# Patient Record
Sex: FEMALE | Race: WHITE | Employment: OTHER | URBAN - METROPOLITAN AREA
[De-identification: names, ages, dates, MRNs, and addresses within clinical notes are randomized per-mention and may not be internally consistent; named-entity substitution may affect disease eponyms.]

---

## 2017-04-14 ENCOUNTER — OFFICE VISIT (OUTPATIENT)
Dept: CARDIOLOGY CLINIC | Age: 27
End: 2017-04-14

## 2017-04-14 VITALS
OXYGEN SATURATION: 98 % | BODY MASS INDEX: 29.16 KG/M2 | RESPIRATION RATE: 19 BRPM | DIASTOLIC BLOOD PRESSURE: 84 MMHG | HEART RATE: 117 BPM | WEIGHT: 175 LBS | HEIGHT: 65 IN | SYSTOLIC BLOOD PRESSURE: 152 MMHG

## 2017-04-14 DIAGNOSIS — M51.37 DDD (DEGENERATIVE DISC DISEASE), LUMBOSACRAL: ICD-10-CM

## 2017-04-14 DIAGNOSIS — R00.0 TACHYCARDIA: Primary | ICD-10-CM

## 2017-04-14 DIAGNOSIS — I10 ESSENTIAL HYPERTENSION: ICD-10-CM

## 2017-04-14 DIAGNOSIS — F90.9 ATTENTION DEFICIT HYPERACTIVITY DISORDER (ADHD), UNSPECIFIED ADHD TYPE: ICD-10-CM

## 2017-04-14 DIAGNOSIS — E71.40 CARNITINE DEFICIENCY (HCC): ICD-10-CM

## 2017-04-14 RX ORDER — METOPROLOL SUCCINATE 50 MG/1
50 TABLET, EXTENDED RELEASE ORAL DAILY
Qty: 30 TAB | Refills: 5 | Status: SHIPPED | OUTPATIENT
Start: 2017-04-14 | End: 2017-05-10 | Stop reason: SDUPTHER

## 2017-04-14 RX ORDER — GABAPENTIN 600 MG/1
600 TABLET ORAL 3 TIMES DAILY
COMMUNITY

## 2017-04-14 NOTE — MR AVS SNAPSHOT
Visit Information Date & Time Provider Department Dept. Phone Encounter #  
 4/14/2017  3:20 PM Brandon Nielsen MD [facility] Elaina Grajeda [phone] [id number] Follow-up Instructions Return in about 4 weeks (around 5/12/2017). Upcoming Health Maintenance Date Due  
 PAP AKA CERVICAL CYTOLOGY 3/7/2011 INFLUENZA AGE 9 TO ADULT 8/1/2016 DTaP/Tdap/Td series (3 - Td) 7/26/2017 Allergies as of 4/14/2017  Review Complete On: 4/14/2017 By: Matt Sotelo Severity Noted Reaction Type Reactions Ceclor Cd  10/28/2010   Side Effect Hives Ceclor [Cefaclor]  07/01/2016    Hives Current Immunizations  Reviewed on 11/4/2016 Name Date HIB Vaccine 6/25/1991, 3/8/1991 Hepatitis B Vaccine 8/8/2001, 5/3/2001, 9/22/2000 IPV 4/7/1994, 10/21/1991, 1990, 1990 Influenza Vaccine 2/1/2016 Influenza Vaccine Whole 12/7/2009 MMR Vaccine 5/5/1995, 6/25/1991 Meningococcal Vaccine 12/7/2006 Pneumococcal Vaccine (Unspecified Type) 12/6/2006 TB Skin Test (PPD) 1/1/2015 TD Vaccine 2/26/2004 TDAP Vaccine 7/26/2007 Varicella Virus Vaccine Live 3/7/1993 Not reviewed this visit You Were Diagnosed With   
  
 Codes Comments Essential hypertension    -  Primary ICD-10-CM: I10 
ICD-9-CM: 401.9 Tachycardia     ICD-10-CM: R00.0 ICD-9-CM: 767. 0 Vitals BP Pulse Resp Height(growth percentile) Weight(growth percentile) LMP  
 152/84 (BP 1 Location: Left arm, BP Patient Position: Sitting) (!) 117 19 5' 5\" (1.651 m) 175 lb (79.4 kg) 06/22/2016 SpO2 BMI OB Status Smoking Status 98% 29.12 kg/m2 Pregnant Never Smoker Vitals History BMI and BSA Data Body Mass Index Body Surface Area  
 29.12 kg/m 2 1.91 m 2 Preferred Pharmacy Pharmacy Name Phone [facility], [address] [facility] [phone] Your Updated Medication List  
  
   
 This list is accurate as of: 4/14/17  4:28 PM.  Always use your most recent med list.  
  
  
  
  
 ALPRAZolam 0.5 mg tablet Commonly known as:  Toy Moment Take 0.5 mg by mouth three (3) times daily as needed for Anxiety. AMPHETAMINE SALT COMBO PO Take 10 mg by mouth every evening. folic acid 1 mg tablet Commonly known as:  Google Take 1 mg by mouth daily. gabapentin 600 mg tablet Commonly known as:  NEURONTIN Take 600 mg by mouth four (4) times daily. levOCARNitine Tartrate 500 mg Cap Commonly known as:  L-CARNITINE Take 10,000 mg by mouth daily. * Lisdexamfetamine 70 mg capsule Commonly known as:  VYVANSE Take 1 Cap by mouth every morning. Fill 1/28/14  
  
 * VYVANSE 70 mg capsule Generic drug:  Lisdexamfetamine Take 1 Cap by mouth every morning. naloxegol 25 mg Tab tablet Commonly known as:  Nile Paulina Take  by mouth Daily (before breakfast). oxyCODONE IR 10 mg Tab immediate release tablet Commonly known as:  Judithe Burn Take 10 mg by mouth two (2) times a day. * Notice: This list has 2 medication(s) that are the same as other medications prescribed for you. Read the directions carefully, and ask your doctor or other care provider to review them with you. We Performed the Following AMB POC EKG ROUTINE W/ 12 LEADS, INTER & REP [97814 CPT(R)] Follow-up Instructions Return in about 4 weeks (around 5/12/2017). Patient Instructions Start Toprol XL 50mg at bedtime. Let us know your BP recordings in 1 week. Introducing [facility]! Dear Mil Mathur: Thank you for requesting a [employer] account. Our records indicate that you have previously registered for a [employer] account but its currently inactive. Please call our [employer] support line at [phone]. Additional Information If you have questions, please visit the Frequently Asked Questions section of the [employer] website at [email or web address] Remember, [employer] is NOT to be used for urgent needs. For medical emergencies, dial 911. Now available from your iPhone and Android! Please provide this summary of care documentation to your next provider. Your primary care clinician is listed as Jing Mon. If you have any questions after today's visit, please call [phone].

## 2017-04-14 NOTE — PROGRESS NOTES
Joanne Art, [facility] [address]  [address]  [place], [address] [facility]    Office [phone]  Fax [phone]  Cell [phone]        Zachary Troncoso is a 32 y.o. female. Last seen 3 years ago. Assessment  Encounter Diagnoses   Name Primary?  Essential hypertension     Tachycardia Yes    Attention deficit hyperactivity disorder (ADHD), unspecified ADHD type     Carnitine deficiency ([address]     DDD (degenerative disc disease), lumbosacral      Recommendations:    Zachary Troncoso has multiple medical problems-carnitine deficiency, ADHD and now DDD with chronic severe pain. She is hyper adrenergic  with sinus tachycardia and diastolic hypertension. I suspect this is driven by pain. In the term, will start Toprol 50mg daily in the evening and have her continue to monitor her BP and heart rate. Will see her back in 1 month to reevaluate. Should surgery be required, she would be at low risk for spine surgery. Follow-up Disposition:  Return in about 4 weeks (around 5/12/2017). Subjective:    She notes elevated resting heart rate and elevated BP for few months now. Her diastolic BPs at home run in the 90s range with resting HR in the 110-125 range. She doesn't report associated dyspnea or cough. Denies any recent life, family or work stressors. Recent blood work with PCP showed normal TSH levels. Patient denies any exertional chest pain, dyspnea, syncope, orthopnea, edema or paroxysmal nocturnal dyspnea. Her activity is limited by chronic low back pain. She has been on oral steroids for 1 month. Notes 30 pound weight gain from this therapy. She was evaluated at [facility], but she is not interested in fusion at this time. She is consistent with isometric PT exercises daily and plans to start aquatic therapy soon. She is a non-smoker. She does drink any caffeine. She takes Zanax as needed.  She's had normal pregnancies in the past.     Cardiac risk factors HTN yes  DM no  Smoking no  Family hx of CAD yes-Mother with [address], Father with AF     Cardiac testing  No specialty comments available. Past Medical History:   Diagnosis Date    ADHD (attention deficit hyperactivity disorder) 2009    dx Dr. Doug Varner in [address]  Carnitine deficiency [facility])     Chronic pain     from myositis, myopathy    FH: rheumatoid arthritis 10/1/2013    Fibromyalgia     HSP (Henoch Schonlein purpura) ([address]     occurs w myopathy flares    HSP (Henoch Schonlein purpura) ([facility] 12/24/2014    Muscle pain     Muscle weakness     Myopathy 2005    Dr. Ira Akbar, Dr. Ryan Maki, genetic/metabolic? mitochondrial disorder,  [facility] muscle biopsy    Tiredness     VSD (ventricular septal defect)     spontaneous closure        Current Outpatient Prescriptions   Medication Sig Dispense Refill    gabapentin (NEURONTIN) 600 mg tablet Take 600 mg by mouth four (4) times daily.  ALPRAZolam (XANAX) 0.5 mg tablet Take 0.5 mg by mouth three (3) times daily as needed for Anxiety.  folic acid (FOLVITE) 1 mg tablet Take 1 mg by mouth daily.  oxyCODONE IR (ROXICODONE) 10 mg tab immediate release tablet Take 10 mg by mouth two (2) times a day.  VYVANSE 70 mg capsule Take 1 Cap by mouth every morning. 30 Cap 0    Lisdexamfetamine (VYVANSE) 70 mg capsule Take 1 Cap by mouth every morning. Fill 1/28/14 30 Cap 0    Levocarnitine Tartrate (L-CARNITINE) 500 mg Cap Take 10,000 mg by mouth daily. 2000 Cap 0    DEXTROAMPHETAMINE/AMPHETAMINE (AMPHETAMINE SALT COMBO PO) Take 10 mg by mouth every evening.  naloxegol (MOVANTIK) 25 mg tab tablet Take  by mouth Daily (before breakfast). Allergies   Allergen Reactions    Ceclor Cd Hives    Ceclor [Cefaclor] Hives          Review of Systems  Constitutional: Negative for fever, chills, malaise/fatigue and diaphoresis. Respiratory: Negative for cough, hemoptysis, sputum production, shortness of breath and wheezing.    Cardiovascular: Negative for chest pain, orthopnea, claudication, leg swelling and PND. Positive for palpitations. Gastrointestinal: Negative for heartburn, nausea, vomiting, blood in stool and melena. Genitourinary: Negative for dysuria and flank pain. Musculoskeletal: Negative for joint pain and back pain. Skin: Negative for rash. Neurological: Negative for focal weakness, seizures, loss of consciousness, weakness and headaches. Endo/Heme/Allergies: Does not bruise/bleed easily. Psychiatric/Behavioral: Negative for memory loss. The patient does not have insomnia. Physical Exam    Visit Vitals    /84 (BP 1 Location: Left arm, BP Patient Position: Sitting)    Pulse (!) 117    Resp 19    Ht 5' 5\" (1.651 m)    Wt 175 lb (79.4 kg)    LMP 06/22/2016    SpO2 98%    BMI 29.12 kg/m2     Wt Readings from Last 3 Encounters:   04/14/17 175 lb (79.4 kg)   11/04/16 175 lb (79.4 kg)   07/01/16 169 lb 9.6 oz (76.9 kg)      General - well developed well nourished  Neck - JVP normal, thyroid nl  Cardiac - normal S1, S2, no murmurs, rubs or gallops. No clicks  Vascular - carotids without bruits, radials, femorals and pedal pulses equal bilateral  Lungs - clear to auscultation bilaterals, no rales, wheezing or rhonchi  Abd - soft nontender, no HSM, no abd bruits  Extremities - no edema  Skin - no rash  Neuro - nonfocal  Psych - normal mood and affect      Cardiographics  ECG 12/24/14 - RSR', probably normal variant, normal axis, normal voltage. Echo 04/14/17- LVEF 65%, normal study.    EKG 04/14/17- Sinus tachycardia 109, RSR' V1, similar to previous EKG     Written by Nellie Pallas, as dictated by Xochitl Eng MD.   Xochitl Egn MD

## 2017-04-14 NOTE — PROGRESS NOTES
Visit Vitals    /84 (BP 1 Location: Left arm, BP Patient Position: Sitting)    Pulse (!) 117    Resp 19    Ht 5' 5\" (1.651 m)    Wt 175 lb (79.4 kg)    SpO2 98%    BMI 29.12 kg/m2

## 2017-04-24 ENCOUNTER — TELEPHONE (OUTPATIENT)
Dept: CARDIOLOGY CLINIC | Age: 27
End: 2017-04-24

## 2017-04-24 NOTE — TELEPHONE ENCOUNTER
JUAN Rose LPN        Caller: Unspecified (Today, 10:54 AM)                     BP and HR trend have both improved on Toprol 50.  Continue current medication regimen and follow up with Dr. Bethany Langley next month as previously scheduled. Patient notified. She voices understanding. She states she has met her deductible for the year until June if any additional testing is needed.

## 2017-04-24 NOTE — TELEPHONE ENCOUNTER
Patient called regarding her BP readings. 4/18 158/102            4/19 148/96       112  4/20 156/95       108   4/21 136/88       106  4/22 134/93         97  4/23 129/84        110  4/24 120/76          96     She can be reached at [phone].  Thanks

## 2017-05-03 ENCOUNTER — TELEPHONE (OUTPATIENT)
Dept: CARDIOLOGY CLINIC | Age: 27
End: 2017-05-03

## 2017-05-03 NOTE — TELEPHONE ENCOUNTER
Please call Mrs. Loera at [phone]. Her ortho dr cancelled her spinal injection for tomorrow, 5/4/17 due to elevated heart rate that would not come down, 115/130.   Please call her to advise, she's in a lot of pain and would really like to be able to get back on the schedule for tomorrow with the ortho dr. Familia Mims you, Radha Butler

## 2017-05-03 NOTE — TELEPHONE ENCOUNTER
Patient states her spinal injection was rescheduled to tomorrow morning due to elevated heart rate. 5/3 157/98 P122  5/2 146/99 P132  5/1 140/98 P113    Discussed relaxation techniques. She states she has cut down a lot on caffeine intake. Will follow-up on medication regimen in the morning.

## 2017-05-04 NOTE — TELEPHONE ENCOUNTER
JUAN Zambrano LPN        Caller: Unspecified (Yesterday,  1:41 PM)                     Did she start the Toprol XL 50 mg, after she saw Dr. Jasmin Chung 3 weeks ago. She's supposed to come back 1 month from that appt. Perhaps she should postpone the injection until she she's Dr. Jasmin Chung again. Confirmed that patient has increased Toprol XL to 50mg daily. She did not get her injection today. HR this morning is 115. Will move patient's follow-up appointment to 5/10/17.

## 2017-05-10 ENCOUNTER — OFFICE VISIT (OUTPATIENT)
Dept: CARDIOLOGY CLINIC | Age: 27
End: 2017-05-10

## 2017-05-10 VITALS
BODY MASS INDEX: 33.76 KG/M2 | HEIGHT: 65 IN | HEART RATE: 120 BPM | WEIGHT: 202.6 LBS | OXYGEN SATURATION: 100 % | DIASTOLIC BLOOD PRESSURE: 90 MMHG | SYSTOLIC BLOOD PRESSURE: 120 MMHG

## 2017-05-10 DIAGNOSIS — M51.37 DDD (DEGENERATIVE DISC DISEASE), LUMBOSACRAL: ICD-10-CM

## 2017-05-10 DIAGNOSIS — F41.9 ANXIETY: ICD-10-CM

## 2017-05-10 DIAGNOSIS — G89.4 CHRONIC PAIN SYNDROME: ICD-10-CM

## 2017-05-10 DIAGNOSIS — F90.9 ATTENTION DEFICIT HYPERACTIVITY DISORDER (ADHD), UNSPECIFIED ADHD TYPE: ICD-10-CM

## 2017-05-10 DIAGNOSIS — R00.0 SINUS TACHYCARDIA: Primary | ICD-10-CM

## 2017-05-10 DIAGNOSIS — E71.40 CARNITINE DEFICIENCY (HCC): ICD-10-CM

## 2017-05-10 DIAGNOSIS — G89.29 CHRONIC BACK PAIN GREATER THAN 3 MONTHS DURATION: ICD-10-CM

## 2017-05-10 DIAGNOSIS — M54.9 CHRONIC BACK PAIN GREATER THAN 3 MONTHS DURATION: ICD-10-CM

## 2017-05-10 DIAGNOSIS — I10 ESSENTIAL HYPERTENSION: ICD-10-CM

## 2017-05-10 RX ORDER — METOPROLOL SUCCINATE 100 MG/1
100 TABLET, EXTENDED RELEASE ORAL DAILY
Qty: 30 TAB | Refills: 5 | Status: SHIPPED | OUTPATIENT
Start: 2017-05-10 | End: 2018-01-31 | Stop reason: SDUPTHER

## 2017-05-10 NOTE — MR AVS SNAPSHOT
Visit Information Date & Time Provider Department Dept. Phone Encounter #  
 5/10/2017  8:40 AM Himanshu Díaz MD [facility] Laurel Gordon [phone] [id number] Follow-up Instructions Return in about 6 months (around 11/10/2017). Upcoming Health Maintenance Date Due  
 PAP AKA CERVICAL CYTOLOGY 3/7/2011 DTaP/Tdap/Td series (3 - Td) 7/26/2017 INFLUENZA AGE 9 TO ADULT 8/1/2017 Allergies as of 5/10/2017  Review Complete On: 5/10/2017 By: Ora Mckinnon  
  
 Severity Noted Reaction Type Reactions Ceclor Cd  10/28/2010   Side Effect Hives Ceclor [Cefaclor]  07/01/2016    Hives Current Immunizations  Reviewed on 11/4/2016 Name Date HIB Vaccine 6/25/1991, 3/8/1991 Hepatitis B Vaccine 8/8/2001, 5/3/2001, 9/22/2000 IPV 4/7/1994, 10/21/1991, 1990, 1990 Influenza Vaccine 2/1/2016 Influenza Vaccine Whole 12/7/2009 MMR Vaccine 5/5/1995, 6/25/1991 Meningococcal Vaccine 12/7/2006 Pneumococcal Vaccine (Unspecified Type) 12/6/2006 TB Skin Test (PPD) 1/1/2015 TD Vaccine 2/26/2004 TDAP Vaccine 7/26/2007 Varicella Virus Vaccine Live 3/7/1993 Not reviewed this visit You Were Diagnosed With   
  
 Codes Comments Essential hypertension    -  Primary ICD-10-CM: I10 
ICD-9-CM: 401.9 Carnitine deficiency ([facility]     ICD-10-CM: E71.40 ICD-9-CM: 277.81 Attention deficit hyperactivity disorder (ADHD), unspecified ADHD type     ICD-10-CM: F90.9 ICD-9-CM: 314.01   
 DDD (degenerative disc disease), lumbosacral     ICD-10-CM: M51.37 
ICD-9-CM: 722.52 Chronic back pain greater than 3 months duration     ICD-10-CM: M54.9, G89.29 ICD-9-CM: 724.5, 338.29 Vitals BP Pulse Height(growth percentile) Weight(growth percentile) LMP SpO2  
 120/90 (BP 1 Location: Left arm, BP Patient Position: Sitting) (!) 120 5' 5\" (1.651 m) 202 lb 9.6 oz (91.9 kg) 06/22/2016 100% BMI OB Status Smoking Status 33.71 kg/m2 Pregnant Never Smoker Vitals History BMI and BSA Data Body Mass Index Body Surface Area 33.71 kg/m 2 2.05 m 2 Preferred Pharmacy Pharmacy Name Phone [address], [place] [phone] Your Updated Medication List  
  
   
This list is accurate as of: 5/10/17  9:42 AM.  Always use your most recent med list.  
  
  
  
  
 ALPRAZolam 0.5 mg tablet Commonly known as:  Lorelee Fitzwilliam Take 0.5 mg by mouth three (3) times daily as needed for Anxiety. AMPHETAMINE SALT COMBO PO Take 10 mg by mouth every evening. folic acid 1 mg tablet Commonly known as:  Google Take 1 mg by mouth daily. gabapentin 600 mg tablet Commonly known as:  NEURONTIN Take 600 mg by mouth four (4) times daily. levOCARNitine Tartrate 500 mg Cap Commonly known as:  L-CARNITINE Take 10,000 mg by mouth daily. Lisdexamfetamine 70 mg capsule Commonly known as:  VYVANSE Take 1 Cap by mouth every morning. Fill 1/28/14  
  
 metoprolol succinate 50 mg XL tablet Commonly known as:  TOPROL-XL Take 1 Tab by mouth daily. oxyCODONE IR 10 mg Tab immediate release tablet Commonly known as:  Michaell Lunch Take 10 mg by mouth as needed. Follow-up Instructions Return in about 6 months (around 11/10/2017). Patient Instructions Increase Toprol XL 50mg to twice a day. Introducing [facility]! [employer] introduces [employer] patient portal. Now you can access parts of your medical record, email your doctor's office, and request medication refills online. 1. In your internet browser, go to [email or web address] 2. Click on the First Time User? Click Here link in the Sign In box. You will see the New Member Sign Up page. 3. Enter your [employer] Access Code exactly as it appears below. You will not need to use this code after youve completed the sign-up process.  If you do not sign up before the expiration date, you must request a new code. · [employer] Access Code: [email or web address] Expires: 7/13/2017  4:31 PM 
 
4. Enter the last four digits of your Social Security Number (xxxx) and Date of Birth (mm/dd/yyyy) as indicated and click Submit. You will be taken to the next sign-up page. 5. Create a [employer] ID. This will be your [employer] login ID and cannot be changed, so think of one that is secure and easy to remember. 6. Create a [employer] password. You can change your password at any time. 7. Enter your Password Reset Question and Answer. This can be used at a later time if you forget your password. 8. Enter your e-mail address. You will receive e-mail notification when new information is available in [address] 9. Click Sign Up. You can now view and download portions of your medical record. 10. Click the Download Summary menu link to download a portable copy of your medical information. If you have questions, please visit the Frequently Asked Questions section of the [employer] website. Remember, [employer] is NOT to be used for urgent needs. For medical emergencies, dial 911. Now available from your iPhone and Android! Please provide this summary of care documentation to your next provider. Your primary care clinician is listed as Genevieve Damian. If you have any questions after today's visit, please call [phone].

## 2017-05-10 NOTE — PROGRESS NOTES
Visit Vitals    /90 (BP 1 Location: Left arm, BP Patient Position: Sitting)    Pulse (!) 120    Ht 5' 5\" (1.651 m)    Wt 202 lb 9.6 oz (91.9 kg)    SpO2 100%    BMI 33.71 kg/m2

## 2017-05-10 NOTE — PROGRESS NOTES
Joanne Palma, [address]  [address]  [place], [address] [facility]    Office [phone]  Fax [phone]  Cell [phone]        Bassam Gordon is a 32 y.o. female. Last seen 3 years ago. Assessment  Encounter Diagnoses   Name Primary?  Essential hypertension     Carnitine deficiency (HCC)     Attention deficit hyperactivity disorder (ADHD), unspecified ADHD type     DDD (degenerative disc disease), lumbosacral     Chronic back pain greater than 3 months duration     Chronic pain syndrome     Anxiety     Sinus tachycardia Yes     Recommendations:    Bassam Gordon continues to have severe low back pain due to advanced DDD. She continues to be hyperadrenergic with ST and diastolic HTN despite Toprol XL 50mg daily. Short term, will increase Toprol XL 50mg BID. She is trying to follow stress management techniques. I encouraged her to take Xanax more regularly to manage her anxiety. She will discuss more pain management techniques with her spine specialist.    She is at low cardiac risk for any spinal procedure. Follow-up Disposition:  Return in about 6 months (around 11/10/2017). Subjective:    She continues to have elevated HR at rest and with activity. She pursues deep breathing and aqua therapy to help lower her heart rate, which has been successful intermittently. I reviewed recent BPs and HR - variable but have seen normal BPs and HR < 100 when her pain is less. She takes Oxycodone as needed for pain. She was also placed on Gabapentin, but is afraid of taking multiple pain/nerve relief medications all at the same. She is not taking any NSAIDS as she plans to have a lumbar injection at L4-L5. She takes Xanax as needed every other day with improvement in her anxiety and palpitations. Patient denies any exertional chest pain, dyspnea, syncope, orthopnea, edema or paroxysmal nocturnal dyspnea.     Cardiac risk factors   HTN yes  DM no  Smoking no  Family hx of CAD yes-Mother with [address] [place], Father with AF     Cardiac testing  No specialty comments available. Past Medical History:   Diagnosis Date    ADHD (attention deficit hyperactivity disorder) 2009    dx Dr. Cooper Callaway in [address] [place]  Carnitine deficiency [facility])     Chronic pain     from myositis, myopathy    FH: rheumatoid arthritis 10/1/2013    Fibromyalgia     HSP (Henoch Schonlein purpura) ([facility]     occurs w myopathy flares    HSP (Henoch Schonlein purpura) ([facility] 12/24/2014    Muscle pain     Muscle weakness     Myopathy 2005    Dr. Divine Wan, Dr. Preston Singer, genetic/metabolic? mitochondrial disorder,  [facility] muscle biopsy    Tiredness     VSD (ventricular septal defect)     spontaneous closure        Current Outpatient Prescriptions   Medication Sig Dispense Refill    gabapentin (NEURONTIN) 600 mg tablet Take 600 mg by mouth four (4) times daily.  metoprolol succinate (TOPROL-XL) 50 mg XL tablet Take 1 Tab by mouth daily. 30 Tab 5    ALPRAZolam (XANAX) 0.5 mg tablet Take 0.5 mg by mouth three (3) times daily as needed for Anxiety.  DEXTROAMPHETAMINE/AMPHETAMINE (AMPHETAMINE SALT COMBO PO) Take 10 mg by mouth every evening.  folic acid (FOLVITE) 1 mg tablet Take 1 mg by mouth daily.  oxyCODONE IR (ROXICODONE) 10 mg tab immediate release tablet Take 10 mg by mouth as needed.  Lisdexamfetamine (VYVANSE) 70 mg capsule Take 1 Cap by mouth every morning. Fill 1/28/14 30 Cap 0    Levocarnitine Tartrate (L-CARNITINE) 500 mg Cap Take 10,000 mg by mouth daily. 2000 Cap 0       Allergies   Allergen Reactions    Ceclor Cd Hives    Ceclor [Cefaclor] Hives          Review of Systems  Constitutional: Negative for fever, chills, malaise/fatigue and diaphoresis. Respiratory: Negative for cough, hemoptysis, sputum production, shortness of breath and wheezing. Cardiovascular: Negative for chest pain, orthopnea, claudication, leg swelling and PND.  Positive for palpitations. Gastrointestinal: Negative for heartburn, nausea, vomiting, blood in stool and melena. Genitourinary: Negative for dysuria and flank pain. Musculoskeletal: Negative for joint pain and back pain. Skin: Negative for rash. Neurological: Negative for focal weakness, seizures, loss of consciousness, weakness and headaches. Endo/Heme/Allergies: Does not bruise/bleed easily. Psychiatric/Behavioral: Negative for memory loss. The patient does not have insomnia. Physical Exam    Visit Vitals    /90 (BP 1 Location: Left arm, BP Patient Position: Sitting)    Pulse (!) 120    Ht 5' 5\" (1.651 m)    Wt 202 lb 9.6 oz (91.9 kg)    LMP 06/22/2016    SpO2 100%    BMI 33.71 kg/m2     Wt Readings from Last 3 Encounters:   05/10/17 202 lb 9.6 oz (91.9 kg)   04/14/17 175 lb (79.4 kg)   11/04/16 175 lb (79.4 kg)      General - well developed well nourished  Neck - JVP normal, thyroid nl  Cardiac - normal S1, S2, no murmurs, rubs or gallops. No clicks  Vascular - carotids without bruits, radials, femorals and pedal pulses equal bilateral  Lungs - clear to auscultation bilaterals, no rales, wheezing or rhonchi  Abd - soft nontender, no HSM, no abd bruits  Extremities - no edema  Skin - no rash  Neuro - nonfocal  Psych - normal mood and affect      Cardiographics  ECG 12/24/14 - RSR', probably normal variant, normal axis, normal voltage. Echo 04/14/17- LVEF 65%, normal study.    EKG 04/14/17- Sinus tachycardia 109, RSR' V1, similar to previous EKG     Written by Mai Garay, as dictated by Elmer Myles MD.   Elmer Myles MD

## 2017-06-01 ENCOUNTER — HOSPITAL ENCOUNTER (OUTPATIENT)
Dept: MRI IMAGING | Age: 27
Discharge: HOME | End: 2017-06-01
Attending: INTERNAL MEDICINE
Payer: COMMERCIAL

## 2017-06-01 DIAGNOSIS — M45.0 ANKYLOSING SPONDYLITIS OF MULTIPLE SITES IN SPINE (HCC): ICD-10-CM

## 2017-06-01 DIAGNOSIS — M54.9 BACK PAIN: ICD-10-CM

## 2017-06-01 PROCEDURE — 72195 MRI PELVIS W/O DYE: CPT

## 2017-06-07 ENCOUNTER — HOSPITAL ENCOUNTER (OUTPATIENT)
Dept: MRI IMAGING | Age: 27
Discharge: HOME | End: 2017-06-07
Attending: INTERNAL MEDICINE
Payer: COMMERCIAL

## 2017-06-07 DIAGNOSIS — M54.9 BACK PAIN: ICD-10-CM

## 2017-06-07 DIAGNOSIS — M45.0 ANKYLOSING SPONDYLITIS OF MULTIPLE SITES IN SPINE (HCC): ICD-10-CM

## 2017-06-07 PROCEDURE — 74011250636 HC RX REV CODE- 250/636: Performed by: INTERNAL MEDICINE

## 2017-06-07 PROCEDURE — 72197 MRI PELVIS W/O & W/DYE: CPT

## 2017-06-07 PROCEDURE — A9585 GADOBUTROL INJECTION: HCPCS | Performed by: INTERNAL MEDICINE

## 2017-06-07 RX ADMIN — GADOBUTROL 8 ML: 604.72 INJECTION INTRAVENOUS at 14:25

## 2018-01-15 ENCOUNTER — OFFICE VISIT (OUTPATIENT)
Dept: CARDIOLOGY CLINIC | Age: 28
End: 2018-01-15

## 2018-01-15 VITALS
SYSTOLIC BLOOD PRESSURE: 100 MMHG | WEIGHT: 198.6 LBS | HEIGHT: 65 IN | DIASTOLIC BLOOD PRESSURE: 88 MMHG | BODY MASS INDEX: 33.09 KG/M2 | OXYGEN SATURATION: 99 % | HEART RATE: 120 BPM

## 2018-01-15 DIAGNOSIS — I10 ESSENTIAL HYPERTENSION: Primary | ICD-10-CM

## 2018-01-15 DIAGNOSIS — R00.0 SINUS TACHYCARDIA: ICD-10-CM

## 2018-01-15 DIAGNOSIS — M54.9 CHRONIC BACK PAIN GREATER THAN 3 MONTHS DURATION: ICD-10-CM

## 2018-01-15 DIAGNOSIS — G89.4 CHRONIC PAIN SYNDROME: ICD-10-CM

## 2018-01-15 DIAGNOSIS — F90.9 ATTENTION DEFICIT HYPERACTIVITY DISORDER (ADHD), UNSPECIFIED ADHD TYPE: ICD-10-CM

## 2018-01-15 DIAGNOSIS — M51.37 DDD (DEGENERATIVE DISC DISEASE), LUMBOSACRAL: ICD-10-CM

## 2018-01-15 DIAGNOSIS — G89.29 CHRONIC BACK PAIN GREATER THAN 3 MONTHS DURATION: ICD-10-CM

## 2018-01-15 NOTE — PROGRESS NOTES
Joanne Burnett [address]  [address], [address]  [place], [address] [facility]    Office [phone]  Fax [phone]  Cell [phone]        Faviola Tatum is a 32 y.o. female. Last seen 8 months ago. Assessment  Encounter Diagnoses   Name Primary?  Essential hypertension Yes    Attention deficit hyperactivity disorder (ADHD), unspecified ADHD type     Chronic pain syndrome     Chronic back pain greater than 3 months duration     DDD (degenerative disc disease), lumbosacral     Sinus tachycardia      Recommendations:    Faviola Tatum has chronic ST and diastolic HTN due to hyperadernegic state as a result of chronic pain. Despite back surgery 6 months ago, she continues to have chronic, severe back pain. She is under significant financial pressures as well and is  from her . I do believe she would benefit from an integrative medicine approach focusing on alternative forms of pain and stress management. Continue Toprol for now. To simplify her care, I will see her on a PRN basis and have Dr. Munir White manage her Toprol prescriptions. Subjective:    Ms. Lily Giraldo underwent lumbar surgery at [facility] last June. She continues to have back pain and hopes to restart PT in the near future. She does PRN pain medication and yoga for pain. Back pain limits activity, but patient tries to stay active with short increments of walking and reclining bike. She denies any exertional sxs. She continues to notice racing heart rates about once a month and has seen HRs of 140 at rest. She states HR has been running high for the past 2 years with her back pain. She did not have any issues with tachycardia prior to that. Patient denies any exertional chest pain, dyspnea, syncope, orthopnea, edema or paroxysmal nocturnal dyspnea. Of note, she recently filed for disability and is  from her .     Cardiac risk factors   HTN yes  DM no  Smoking no  Family hx of CAD yes- Mother with [address] [place], Father with AF     Cardiac testing  No specialty comments available. Past Medical History:   Diagnosis Date    ADHD (attention deficit hyperactivity disorder) 2009    dx Dr. Yan Paris in [address]  Carnitine deficiency [facility])     Chronic pain     from myositis, myopathy    FH: rheumatoid arthritis 10/1/2013    Fibromyalgia     HSP (Henoch Schonlein purpura) ([facility] [address]     occurs w myopathy flares    HSP (Henoch Schonlein purpura) ([facility] [address] 12/24/2014    Muscle pain     Muscle weakness     Myopathy 2005    Dr. Susan Davis, Dr. Rob Duron, genetic/metabolic? mitochondrial disorder,  [facility] muscle biopsy    Tiredness     VSD (ventricular septal defect)     spontaneous closure        Current Outpatient Prescriptions   Medication Sig Dispense Refill    metoprolol succinate (TOPROL-XL) 100 mg tablet Take 1 Tab by mouth daily. 30 Tab 5    gabapentin (NEURONTIN) 600 mg tablet Take 300 mg by mouth daily.  ALPRAZolam (XANAX) 0.5 mg tablet Take 0.5 mg by mouth three (3) times daily as needed for Anxiety.  folic acid (FOLVITE) 1 mg tablet Take 1 mg by mouth daily.  oxyCODONE IR (ROXICODONE) 10 mg tab immediate release tablet Take 10 mg by mouth as needed.  Lisdexamfetamine (VYVANSE) 70 mg capsule Take 1 Cap by mouth every morning. Fill 1/28/14 30 Cap 0    Levocarnitine Tartrate (L-CARNITINE) 500 mg Cap Take 10,000 mg by mouth daily. 2000 Cap 0    DEXTROAMPHETAMINE/AMPHETAMINE (AMPHETAMINE SALT COMBO PO) Take 10 mg by mouth every evening. Allergies   Allergen Reactions    Ceclor Cd Hives    Ceclor [Cefaclor] Hives          Review of Systems  Constitutional: Negative for fever, chills, malaise/fatigue and diaphoresis. Respiratory: Negative for cough, hemoptysis, sputum production, shortness of breath and wheezing. Cardiovascular: Negative for chest pain, orthopnea, claudication, leg swelling and PND.   Gastrointestinal: Negative for heartburn, nausea, vomiting, blood in stool and melena. Genitourinary: Negative for dysuria and flank pain. Musculoskeletal: +chronic back pain. Skin: Negative for rash. Neurological: Negative for focal weakness, seizures, loss of consciousness, weakness and headaches. Endo/Heme/Allergies: Does not bruise/bleed easily. Psychiatric/Behavioral: Negative for memory loss. The patient does not have insomnia. Physical Exam    Visit Vitals    /88 (BP 1 Location: Left arm, BP Patient Position: Sitting)    Pulse (!) 120    Ht 5' 5\" (1.651 m)    Wt 198 lb 9.6 oz (90.1 kg)    SpO2 99%    BMI 33.05 kg/m2     Wt Readings from Last 3 Encounters:   01/15/18 198 lb 9.6 oz (90.1 kg)   05/10/17 202 lb 9.6 oz (91.9 kg)   04/14/17 175 lb (79.4 kg)      General - well developed well nourished  Neck - JVP normal, thyroid nl  Cardiac - normal S1, S2, no murmurs, rubs or gallops. No clicks  Vascular - carotids without bruits, radials, femorals and pedal pulses equal bilateral  Lungs - clear to auscultation bilaterals, no rales, wheezing or rhonchi  Abd - soft nontender, no HSM, no abd bruits  Extremities - no edema  Skin - no rash  Neuro - nonfocal  Psych - normal mood and affect      Cardiographics  ECG 12/24/14 - RSR', probably normal variant, normal axis, normal voltage. Echo 04/14/17- LVEF 65%, normal study.    EKG 04/14/17- Sinus tachycardia 109, RSR' V1, similar to previous EKG   EKG 1/15/18-     Written by Izabella Velarde, as dictated by Jakob Chowdhury MD.   Jakob Chowdhury MD

## 2018-01-15 NOTE — PROGRESS NOTES
Visit Vitals    /88 (BP 1 Location: Left arm, BP Patient Position: Sitting)    Pulse (!) 120    Ht 5' 5\" (1.651 m)    Wt 198 lb 9.6 oz (90.1 kg)    SpO2 99%    BMI 33.05 kg/m2

## 2018-01-15 NOTE — MR AVS SNAPSHOT
[address] [address] [address] 
[phone] Patient: Faviola Tatum MRN: [id number] SNY:[id number][birth year] Visit Information Date & Time Provider Department Dept. Phone Encounter #  
 1/15/2018  3:20 PM Sherren Cruise, MD [facility] [address] [phone] [id number] Upcoming Health Maintenance Date Due  
 PAP AKA CERVICAL CYTOLOGY 3/7/2011 OB 3RD TRIMESTER TDAP 3/12/2015 DTaP/Tdap/Td series (3 - Td) 7/26/2017 Influenza Age 5 to Adult 8/1/2017 Allergies as of 1/15/2018  Review Complete On: 1/15/2018 By: Faviola Platais  
  
 Severity Noted Reaction Type Reactions Ceclor Cd  10/28/2010   Side Effect Hives Ceclor [Cefaclor]  07/01/2016    Hives Current Immunizations  Reviewed on 11/4/2016 Name Date HIB Vaccine 6/25/1991, 3/8/1991 Hepatitis B Vaccine 8/8/2001, 5/3/2001, 9/22/2000 IPV 4/7/1994, 10/21/1991, 1990, 1990 Influenza Vaccine 2/1/2016 Influenza Vaccine Whole 12/7/2009 MMR Vaccine 5/5/1995, 6/25/1991 Meningococcal Vaccine 12/7/2006 TB Skin Test (PPD) 1/1/2015 TD Vaccine 2/26/2004 TDAP Vaccine 7/26/2007 Varicella Virus Vaccine Live 3/7/1993 ZZZ-RETIRED (DO NOT USE) Pneumococcal Vaccine (Unspecified Type) 12/6/2006 Not reviewed this visit You Were Diagnosed With   
  
 Codes Comments Essential hypertension    -  Primary ICD-10-CM: I10 
ICD-9-CM: 401.9 Vitals BP Pulse Height(growth percentile) Weight(growth percentile) SpO2 BMI  
 100/88 (BP 1 Location: Left arm, BP Patient Position: Sitting) (!) 120 5' 5\" (1.651 m) 198 lb 9.6 oz (90.1 kg) 99% 33.05 kg/m2 OB Status Smoking Status Pregnant Never Smoker Vitals History BMI and BSA Data Body Mass Index Body Surface Area 33.05 kg/m 2 2.03 m 2 Preferred Pharmacy Pharmacy Name Phone [address], [address] [phone] Your Updated Medication List  
  
   
This list is accurate as of: 1/15/18  3:53 PM.  Always use your most recent med list.  
  
  
  
  
 ALPRAZolam 0.5 mg tablet Commonly known as:  [place]a [place] Take 0.5 mg by mouth three (3) times daily as needed for Anxiety. AMPHETAMINE SALT COMBO PO Take 10 mg by mouth every evening. folic acid 1 mg tablet Commonly known as:  [employer] Take 1 mg by mouth daily. gabapentin 600 mg tablet Commonly known as:  NEURONTIN Take 300 mg by mouth daily. levOCARNitine Tartrate 500 mg Cap Commonly known as:  L-CARNITINE Take 10,000 mg by mouth daily. Lisdexamfetamine 70 mg capsule Commonly known as:  VYVANSE Take 1 Cap by mouth every morning. Fill 1/28/14  
  
 metoprolol succinate 100 mg tablet Commonly known as:  TOPROL-XL Take 1 Tab by mouth daily. oxyCODONE IR 10 mg Tab immediate release tablet Commonly known as:  Evita Dennis Take 10 mg by mouth as needed. Introducing [facility]! [facility] introduces [employer] patient portal. Now you can access parts of your medical record, email your doctor's office, and request medication refills online. 1. In your internet browser, go to [email or web address] 2. Click on the First Time User? Click Here link in the Sign In box. You will see the New Member Sign Up page. 3. Enter your [employer] Access Code exactly as it appears below. You will not need to use this code after youve completed the sign-up process. If you do not sign up before the expiration date, you must request a new code. · [employer] Access Code: [email or web address] Expires: 4/15/2018  3:53 PM 
 
4. Enter the last four digits of your Social Security Number (xxxx) and Date of Birth (mm/dd/yyyy) as indicated and click Submit. You will be taken to the next sign-up page. 5. Create a Cl[employer]ia ID. This will be your [employer] login ID and cannot be changed, so think of one that is secure and easy to remember. 6. Create a [employer]ia password. You can change your password at any time. 7. Enter your Password Reset Question and Answer. This can be used at a later time if you forget your password. 8. Enter your e-mail address. You will receive e-mail notification when new information is available in [address] 9. Click Sign Up. You can now view and download portions of your medical record. 10. Click the Download Summary menu link to download a portable copy of your medical information. If you have questions, please visit the Frequently Asked Questions section of the [employer] website. Remember, [employer] is NOT to be used for urgent needs. For medical emergencies, dial 911. Now available from your iPhone and Android! Please provide this summary of care documentation to your next provider. Your primary care clinician is listed as Rebecca Martínez. If you have any questions after today's visit, please call [phone].

## 2018-01-17 PROBLEM — R00.0 SINUS TACHYCARDIA: Status: ACTIVE | Noted: 2018-01-17

## 2018-01-31 RX ORDER — METOPROLOL SUCCINATE 100 MG/1
100 TABLET, EXTENDED RELEASE ORAL DAILY
Qty: 90 TAB | Refills: 3 | Status: SHIPPED | OUTPATIENT
Start: 2018-01-31 | End: 2018-02-28 | Stop reason: SDUPTHER

## 2018-12-06 ENCOUNTER — OFFICE VISIT (OUTPATIENT)
Dept: CARDIOLOGY CLINIC | Age: 28
End: 2018-12-06

## 2018-12-06 VITALS
HEIGHT: 65 IN | WEIGHT: 173 LBS | HEART RATE: 126 BPM | BODY MASS INDEX: 28.82 KG/M2 | DIASTOLIC BLOOD PRESSURE: 86 MMHG | SYSTOLIC BLOOD PRESSURE: 140 MMHG | RESPIRATION RATE: 16 BRPM

## 2018-12-06 DIAGNOSIS — I10 ESSENTIAL HYPERTENSION: Primary | ICD-10-CM

## 2018-12-06 RX ORDER — CLONAZEPAM 1 MG/1
TABLET ORAL
COMMUNITY
Start: 2018-11-30 | End: 2021-12-10

## 2018-12-06 NOTE — PROGRESS NOTES
Chief Complaint   Patient presents with    Irregular Heart Beat    Fatigue     Visit Vitals  /86 (BP 1 Location: Left arm, BP Patient Position: Sitting)   Pulse (!) 126   Resp 16   Ht 5' 5\" (1.651 m)   Wt 173 lb (78.5 kg)   LMP 11/27/2018 (Exact Date)   Breastfeeding?  No   BMI 28.79 kg/m²

## 2018-12-08 NOTE — PROGRESS NOTES
[facility] [address], [address] [facility], [address] [place] [address]    Office [phone],N [phone]           Lionel Santana is a 29 y.o. female seen in urgent visit for ongoing palpitations    Assessment/Recommendations:    Palpitations- EKG with evidence of PACs that appear to be very symptomatic with acute worsening over the last several weeks    Inappropriate sinus tachycardia-has been present for several years    ADHD  Chronic pain  Anxiety disorder  Myopathy disorder    -Primary care physician recently increased metoprolol  succinate 200 mg daily, recommend to continue at current dose, which will hopefully reduce her PACs  -We will start a trial of ivabradine 5 mg twice daily for inappropriate sinus tachycardia to see if it relieves some of her symptoms    Primary Care Physician- Sav Jennings MD    Follow-up 1 month with Dr. Rock Hoffman:  19-year-old female  who is followed by Dr. Jennifer Mohan for inappropriate sinus tachycardia in the setting of ADHD and chronic pain syndrome. She has been on metoprolol for several years. It appears over the last several weeks she has had increased palpitations symptoms. She states she has decreased her ADHD medication without significant improvement. She often will feel the skipping unusual feeling within her throat. Her EKG in the office today shows sinus tachycardia with PACs. She is very anxious in the office today and is concerned something major is wrong with her heart.       Past Medical History:   Diagnosis Date    ADHD (attention deficit hyperactivity disorder) 2009    dx Dr. Dena Lennox in [place]    Carnitine deficiency [facility])     Chronic pain     from myositis, myopathy    FH: rheumatoid arthritis 10/1/2013    Fibromyalgia     HSP (Henoch Schonlein purpura) ([address]     occurs w myopathy flares    HSP (Henoch Schonlein purpura) ([address] 12/24/2014    Muscle pain     Muscle weakness     Myopathy 2005    Dr. High Fatimah, Dr. Lalito Kaufman? mitochondrial disorder,  [place]  w muscle biopsy    Tiredness     VSD (ventricular septal defect)     spontaneous closure        Past Surgical History:   Procedure Laterality Date    HX ACL RECONSTRUCTION  2008    field hockey injury    HX ORTHOPAEDIC      ACL REPAIR    HX SKIN BIOPSY      MUSCLE BIOPSY X 2    [place]    MUSCLE BIOPSY  ~2007    [place]         Current Outpatient Medications:     PNV72-iron carb,glu-FA-dss-dha (CITRANATAL 90 DHA, ALGAL OIL,) 90 mg iron-1 mg -50 mg-300 mg cmpk, daily. , Disp: , Rfl:     clonazePAM (KLONOPIN) 1 mg tablet, nightly as needed. , Disp: , Rfl:     ivabradine (CORLANOR) 5 mg tablet, Take 1 Tab by mouth two (2) times daily (with meals). , Disp: 60 Tab, Rfl: 5    metoprolol succinate (TOPROL-XL) 100 mg tablet, Take 1 Tab by mouth daily. (Patient taking differently: Take 200 mg by mouth daily.), Disp: 30 Tab, Rfl: 6    ALPRAZolam (XANAX) 0.5 mg tablet, Take 0.5 mg by mouth three (3) times daily as needed for Anxiety. , Disp: , Rfl:     folic acid (FOLVITE) 1 mg tablet, Take 1 mg by mouth daily. , Disp: , Rfl:     Lisdexamfetamine (VYVANSE) 70 mg capsule, Take 1 Cap by mouth every morning. Fill 1/28/14, Disp: 30 Cap, Rfl: 0    gabapentin (NEURONTIN) 600 mg tablet, Take 300 mg by mouth daily. , Disp: , Rfl:     DEXTROAMPHETAMINE/AMPHETAMINE (AMPHETAMINE SALT COMBO PO), Take 10 mg by mouth every evening., Disp: , Rfl:     oxyCODONE IR (ROXICODONE) 10 mg tab immediate release tablet, Take 10 mg by mouth as needed. , Disp: , Rfl:     Levocarnitine Tartrate (L-CARNITINE) 500 mg Cap, Take 10,000 mg by mouth daily.  (Patient not taking: Reported on 12/6/2018), Disp: 2000 Cap, Rfl: 0    Allergies   Allergen Reactions    Ceclor Cd Hives    Ceclor [Cefaclor] Hives        Family History   Problem Relation Age of Onset    Diabetes Father     Heart Disease Father     Diabetes Mother    [facility] Arthritis-rheumatoid Mother     Heart Disease Mother     Diabetes Paternal Grandmother     Diabetes Paternal Grandfather     Diabetes Maternal Grandmother     Breast Cancer Other         1 great aunts       Social History     Tobacco Use    Smoking status: Never Smoker    Smokeless tobacco: Never Used   Substance Use Topics    Alcohol use: No     Alcohol/week: 1.5 oz     Types: 3 Standard drinks or equivalent per week     Comment: occasionally    Drug use: No       Review of Symptoms:  Pertinent Positive: Palpitations, heart racing  Pertinent Negative: No chest pain shortness of breath  All Other systems reviewed and are negative for a Comprehensive ROS (10+)    Physical Exam    Blood pressure 140/86, pulse (!) 126, resp. rate 16, height 5' 5\" (1.651 m), weight 173 lb (78.5 kg), last menstrual period 11/27/2018, not currently breastfeeding. Constitutional:  well-developed and well-nourished. Appears very agitated. HENT: Normocephalic. Eyes: No scleral icterus. Neck:  Neck supple. No JVD present. Pulmonary/Chest: Effort normal and breath sounds normal. No respiratory distress, wheezes or rales. Cardiovascular: Tachycardic, regular rhythm, S1 S2 . Exam reveals no gallop and no friction rub. No murmur heard. No edema. Extremities:  Normal muscle tone  Abdominal:   No abnormal distension. Neurological:  Moving all extremities, cranial nerves appear grossly intact. Skin: Skin is not cold. Not diaphoretic. No erythema. Psychiatric:  Grossly normal mood and affect. Intact insight.     Objective Data:    ECG: personally reviewed and  intrepreted  Sinus tachycardia with PACs                Tru Winn DO

## 2019-06-07 ENCOUNTER — HOSPITAL ENCOUNTER (OUTPATIENT)
Dept: ULTRASOUND IMAGING | Age: 29
Discharge: HOME | End: 2019-06-07
Attending: INTERNAL MEDICINE
Payer: COMMERCIAL

## 2019-06-07 DIAGNOSIS — R10.812 LUQ ABDOMINAL TENDERNESS: ICD-10-CM

## 2019-06-07 PROCEDURE — 76700 US EXAM ABDOM COMPLETE: CPT

## 2020-02-15 ENCOUNTER — APPOINTMENT (OUTPATIENT)
Dept: GENERAL RADIOLOGY | Age: 30
End: 2020-02-15
Attending: EMERGENCY MEDICINE
Payer: COMMERCIAL

## 2020-02-15 ENCOUNTER — HOSPITAL ENCOUNTER (EMERGENCY)
Age: 30
Discharge: HOME | End: 2020-02-15
Attending: EMERGENCY MEDICINE
Payer: COMMERCIAL

## 2020-02-15 VITALS
HEIGHT: 64 IN | TEMPERATURE: 97.8 F | HEART RATE: 104 BPM | WEIGHT: 194.22 LBS | DIASTOLIC BLOOD PRESSURE: 93 MMHG | OXYGEN SATURATION: 99 % | BODY MASS INDEX: 33.16 KG/M2 | SYSTOLIC BLOOD PRESSURE: 148 MMHG | RESPIRATION RATE: 20 BRPM

## 2020-02-15 DIAGNOSIS — M54.10 RADICULOPATHY, UNSPECIFIED SPINAL REGION: Primary | ICD-10-CM

## 2020-02-15 PROCEDURE — 72050 X-RAY EXAM NECK SPINE 4/5VWS: CPT

## 2020-02-15 PROCEDURE — 74011250637 HC RX REV CODE- 250/637: Performed by: EMERGENCY MEDICINE

## 2020-02-15 PROCEDURE — 99283 EMERGENCY DEPT VISIT LOW MDM: CPT

## 2020-02-15 RX ORDER — PREDNISONE 20 MG/1
TABLET ORAL
Qty: 20 TAB | Refills: 0 | Status: SHIPPED | OUTPATIENT
Start: 2020-02-15 | End: 2021-12-10

## 2020-02-15 RX ORDER — METHOCARBAMOL 750 MG/1
750 TABLET, FILM COATED ORAL 4 TIMES DAILY
Qty: 30 TAB | Refills: 0 | Status: SHIPPED | OUTPATIENT
Start: 2020-02-15 | End: 2021-12-10

## 2020-02-15 RX ORDER — TRAMADOL HYDROCHLORIDE 50 MG/1
50 TABLET ORAL
Status: COMPLETED | OUTPATIENT
Start: 2020-02-15 | End: 2020-02-15

## 2020-02-15 RX ORDER — OXYCODONE AND ACETAMINOPHEN 5; 325 MG/1; MG/1
1 TABLET ORAL
Qty: 20 TAB | Refills: 0 | Status: SHIPPED | OUTPATIENT
Start: 2020-02-15 | End: 2020-02-20

## 2020-02-15 RX ORDER — TRAMADOL HYDROCHLORIDE 50 MG/1
50 TABLET ORAL
Qty: 10 TAB | Refills: 0 | OUTPATIENT
Start: 2020-02-15 | End: 2020-02-15

## 2020-02-15 RX ORDER — DICLOFENAC SODIUM 75 MG/1
75 TABLET, DELAYED RELEASE ORAL 2 TIMES DAILY
COMMUNITY
End: 2021-12-10

## 2020-02-15 RX ORDER — DIAPER,BRIEF,ADULT, DISPOSABLE
1500 EACH MISCELLANEOUS 3 TIMES DAILY
COMMUNITY

## 2020-02-15 RX ORDER — METHOCARBAMOL 500 MG/1
1000 TABLET, FILM COATED ORAL 4 TIMES DAILY
COMMUNITY
End: 2020-02-15

## 2020-02-15 RX ADMIN — TRAMADOL HYDROCHLORIDE 50 MG: 50 TABLET, FILM COATED ORAL at 16:10

## 2020-02-15 NOTE — ED PROVIDER NOTES
HPI the patient has a 1 month history of right-sided neck pain and for the past weeks the pain has radiated into her right upper arm. She was seen at another facility about a month ago with a neck pain and had a CT scan done but is not sure of what it showed. She denies having injury/overuse. She denies headache, chest pain or other complaints. Past Medical History:   Diagnosis Date    ADHD (attention deficit hyperactivity disorder) 2009    dx Dr. Pauly Jasmine in [address]  Carnitine deficiency [facility])     Chronic pain     from myositis, myopathy    FH: rheumatoid arthritis 10/1/2013    Fibromyalgia     HSP (Henoch Schonlein purpura) ([facility] [address]     occurs w myopathy flares    HSP (Henoch Schonlein purpura) ([facility] [address] 12/24/2014    Muscle pain     Muscle weakness     Myopathy 2005    Dr. Harris Langley, Dr. Jolanta Dan, genetic/metabolic? mitochondrial disorder,  [facility] muscle biopsy    Tiredness     VSD (ventricular septal defect)     spontaneous closure       Past Surgical History:   Procedure Laterality Date    HX ACL RECONSTRUCTION  2008    field hockey injury    HX ORTHOPAEDIC      ACL REPAIR    HX SKIN BIOPSY      MUSCLE BIOPSY X 2    [place]    [place]    [place]    [place]         Family History:   Problem Relation Age of Onset    Diabetes Father     Heart Disease Father     Diabetes Mother     Arthritis-rheumatoid Mother     Heart Disease Mother     Diabetes Paternal Grandmother     Diabetes Paternal Grandfather     Diabetes Maternal Grandmother     Breast Cancer Other         1 great aunts       Social History     Socioeconomic History    Marital status: LEGALLY      Spouse name: Not on file    Number of children: 0    Years of education: Not on file    Highest education level: Not on file   Occupational History    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    Was in nursing training [address] [facility] Financial resource strain: Not on file   [employer] insecurity:     Worry: Not on file     Inability: Not on file    Transportation needs:     Medical: Not on file     Non-medical: Not on file   Tobacco Use    Smoking status: Never Smoker    Smokeless tobacco: Never Used   Substance and Sexual Activity    Alcohol use: No     Alcohol/week: 2.5 standard drinks     Types: 3 Standard drinks or equivalent per week     Comment: occasionally    Drug use: No    Sexual activity: Not Currently   Lifestyle    Physical activity:     Days per week: Not on file     Minutes per session: Not on file    Stress: Not on file   Relationships    Social connections:     Talks on phone: Not on file     Gets together: Not on file     Attends Ep[employer] service: Not on file     Active member of club or organization: Not on file     Attends meetings of clubs or organizations: Not on file     Relationship status: Not on file    Intimate partner violence:     Fear of current or ex partner: Not on file     Emotionally abused: Not on file     Physically abused: Not on file     Forced sexual activity: Not on file   Other Topics Concern    Not on file   Social History Narrative    ** Merged History Encounter **              ALLERGIES: Ceclor cd and Ceclor [cefaclor]    Review of Systems   Musculoskeletal: Positive for neck pain. Neurological: Negative for headaches. Vitals:    02/15/20 1513   BP: (!) 148/93   Pulse: (!) 104   Resp: 20   Temp: 97.8 °F (36.6 °C)   SpO2: 99%   Weight: 88.1 kg (194 lb 3.6 oz)   Height: 5' 4\" (1.626 m)            Physical Exam  Constitutional:       General: She is not in acute distress. Appearance: She is well-developed. HENT:      Head: Normocephalic. Neck:      Musculoskeletal: Normal range of motion. Comments: The neck is nontender to palpation. The right arm has normal range of motion shoulder, elbow, wrist.   strength and radial/ulnar/median nerve function is normal.  Cardiovascular:      Rate and Rhythm: Normal rate. Pulmonary:      Effort: Pulmonary effort is normal.   Musculoskeletal: Normal range of motion. Skin:     General: Skin is warm and dry. Capillary Refill: Capillary refill takes less than 2 seconds. Neurological:      Mental Status: She is alert.    Psychiatric:         Behavior: Behavior normal.          MDM       Procedures

## 2020-02-15 NOTE — ED TRIAGE NOTES
Pt presents to ED with c/o right sided neck pain for one week with pain into right arm. Pt denies any hx of trauma.

## 2020-02-15 NOTE — DISCHARGE INSTRUCTIONS
Patient Education        Pinched Nerve in the Neck: Care Instructions  Your Care Instructions  A pinched nerve in the neck happens when a vertebra or disc in the upper part of your spine is damaged. This damage can happen because of an injury. Or it can just happen with age. The changes caused by the damage may put pressure on a nearby nerve root, pinching it. This causes symptoms such as sharp pain in your neck, shoulder, arm, hand, or back. You may also have tingling or numbness. Sometimes it makes your arm weaker. The symptoms are usually worse when you turn your head or strain your neck. For many people, the symptoms get better over time and finally go away. Early treatment usually includes medicines for pain and swelling. Sometimes physical therapy and special exercises may help. Follow-up care is a key part of your treatment and safety. Be sure to make and go to all appointments, and call your doctor if you are having problems. It's also a good idea to know your test results and keep a list of the medicines you take. How can you care for yourself at home? · Be safe with medicines. Read and follow all instructions on the label. ¨ If the doctor gave you a prescription medicine for pain, take it as prescribed. ¨ If you are not taking a prescription pain medicine, ask your doctor if you can take an over-the-counter medicine. · Try using a heating pad on a low or medium setting for 15 to 20 minutes every 2 or 3 hours. Try a warm shower in place of one session with the heating pad. You can also buy single-use heat wraps that last up to 8 hours. · You can also try an ice pack for 10 to 15 minutes every 2 to 3 hours. There isn't strong evidence that either heat or ice will help. But you can try them to see if they help you. · Don't spend too long in one position. Take short breaks to move around and change positions. · Wear a seat belt and shoulder harness when you are in a car.   · Sleep with a pillow under your head and neck that keeps your neck straight. · If you were given a neck brace (cervical collar) to limit neck motion, wear it as instructed for as many days as your doctor tells you to. Do not wear it longer than you were told to. Wearing a brace for too long can lead to neck stiffness and can weaken the neck muscles. · Follow your doctor's instructions for gentle neck-stretching exercises. · Do not smoke. Smoking can slow healing of your discs. If you need help quitting, talk to your doctor about stop-smoking programs and medicines. These can increase your chances of quitting for good. · Avoid strenuous work or exercise until your doctor says it is okay. When should you call for help? Call 911 anytime you think you may need emergency care. For example, call if:  ? · You are unable to move an arm or a leg at all. ?Call your doctor now or seek immediate medical care if:  ? · You have new or worse symptoms in your arms, legs, chest, belly, or buttocks. Symptoms may include:  ¨ Numbness or tingling. ¨ Weakness. ¨ Pain. ? · You lose bladder or bowel control. ? Watch closely for changes in your health, and be sure to contact your doctor if:  ? · You are not getting better as expected. Where can you learn more? Go to http://kun-tyshawn.info/. Enter Z711 in the search box to learn more about \"Pinched Nerve in the Neck: Care Instructions. \"  Current as of: March 21, 2017  Content Version: 11.5  © 0642-6209 [employer], [employer]. Care instructions adapted under license by [employer] (which disclaims liability or warranty for this information). If you have questions about a medical condition or this instruction, always ask your healthcare professional. [address] any warranty or liability for your use of this information.

## 2020-02-24 ENCOUNTER — HOSPITAL ENCOUNTER (OUTPATIENT)
Dept: MRI IMAGING | Age: 30
Discharge: HOME | End: 2020-02-24
Attending: PHYSICAL MEDICINE & REHABILITATION
Payer: COMMERCIAL

## 2020-02-24 DIAGNOSIS — M50.20 DISPLACEMENT OF CERVICAL INTERVERTEBRAL DISC WITHOUT MYELOPATHY: ICD-10-CM

## 2020-02-24 DIAGNOSIS — M47.12 CERVICAL SPONDYLOSIS WITH MYELOPATHY: ICD-10-CM

## 2020-02-24 PROCEDURE — 72141 MRI NECK SPINE W/O DYE: CPT

## 2020-11-23 ENCOUNTER — DOCUMENTATION ONLY (OUTPATIENT)
Dept: CARDIOLOGY CLINIC | Age: 30
End: 2020-11-23

## 2020-11-23 ENCOUNTER — TELEPHONE (OUTPATIENT)
Dept: CARDIOLOGY CLINIC | Age: 30
End: 2020-11-23

## 2020-11-23 NOTE — PROGRESS NOTES
Pre-procedure Cardiovascular Risk Assessment received for Ms. Loera ( [birth year])    EGD by Dr. Sylvester Talavera with MAC anesthesia. Concern noted for hx of tachycardia after sedation. Last seen by Dr. Emma Wolff in 2018 for palpitations/tachycardia. Office visit needed in order to provide risk assessment.

## 2020-11-23 NOTE — TELEPHONE ENCOUNTER
Verified patient with two patient identifiers. Spoke with patient and was advised that she needs an appointment to see Ese Khanna or Jo Chacon for pre-op clearance. Per patient she prefers Obi Nelson.

## 2020-12-01 ENCOUNTER — OFFICE VISIT (OUTPATIENT)
Dept: CARDIOLOGY CLINIC | Age: 30
End: 2020-12-01
Payer: COMMERCIAL

## 2020-12-01 VITALS
OXYGEN SATURATION: 96 % | SYSTOLIC BLOOD PRESSURE: 128 MMHG | HEART RATE: 120 BPM | DIASTOLIC BLOOD PRESSURE: 68 MMHG | BODY MASS INDEX: 33.4 KG/M2 | WEIGHT: 194.6 LBS

## 2020-12-01 DIAGNOSIS — M25.50 HYPERMOBILITY ARTHRALGIA: ICD-10-CM

## 2020-12-01 DIAGNOSIS — Z01.810 PREOPERATIVE CARDIOVASCULAR EXAMINATION: Primary | ICD-10-CM

## 2020-12-01 DIAGNOSIS — R00.0 INAPPROPRIATE SINUS NODE TACHYCARDIA: ICD-10-CM

## 2020-12-01 DIAGNOSIS — F90.9 ATTENTION DEFICIT HYPERACTIVITY DISORDER (ADHD), UNSPECIFIED ADHD TYPE: ICD-10-CM

## 2020-12-01 DIAGNOSIS — I10 ESSENTIAL HYPERTENSION: ICD-10-CM

## 2020-12-01 PROCEDURE — 99214 OFFICE O/P EST MOD 30 MIN: CPT | Performed by: STUDENT IN AN ORGANIZED HEALTH CARE EDUCATION/TRAINING PROGRAM

## 2020-12-01 PROCEDURE — 93010 ELECTROCARDIOGRAM REPORT: CPT | Performed by: STUDENT IN AN ORGANIZED HEALTH CARE EDUCATION/TRAINING PROGRAM

## 2020-12-01 PROCEDURE — 93005 ELECTROCARDIOGRAM TRACING: CPT | Performed by: STUDENT IN AN ORGANIZED HEALTH CARE EDUCATION/TRAINING PROGRAM

## 2020-12-01 PROCEDURE — G0463 HOSPITAL OUTPT CLINIC VISIT: HCPCS | Performed by: STUDENT IN AN ORGANIZED HEALTH CARE EDUCATION/TRAINING PROGRAM

## 2020-12-01 RX ORDER — PROPRANOLOL HYDROCHLORIDE 80 MG/1
CAPSULE, EXTENDED RELEASE ORAL DAILY
COMMUNITY
End: 2021-12-10

## 2020-12-01 RX ORDER — ALPRAZOLAM 1 MG/1
TABLET, EXTENDED RELEASE ORAL
COMMUNITY

## 2020-12-01 NOTE — PROGRESS NOTES
Chief Complaint   Patient presents with    Follow-up     Patient needs cardiac clearance for a colonoscopy which has not been sceduled at this time    Hypertension     Visit Vitals  /68 (BP 1 Location: Left arm, BP Patient Position: Sitting)   Pulse (!) 120   Wt 194 lb 9.6 oz (88.3 kg)   SpO2 96%   BMI 33.40 kg/m²           Chest pain denied  SOB - denied  Dizziness denied  Swelling/Edema - BL knees down to feet   Recent hospital visit denied  Refills denied

## 2020-12-01 NOTE — PROGRESS NOTES
[facility] [address], [address] [facility], [address] [place] [address]    Office [phone],[facility] [phone]           Raúl Elliott is a 27 y.o. female seen for preoperative examination    Assessment/Recommendations:      ICD-10-CM ICD-9-CM    1. Preoperative cardiovascular examination  Z01.810 V72.81    2. Inappropriate sinus node tachycardia  R00.0 427.89    3. Essential hypertension  I10 401.9 AMB POC EKG ROUTINE W/ 12 LEADS, INTER & REP   4. Hypermobility arthralgia  M25.50 719.40    5. Attention deficit hyperactivity disorder (ADHD), unspecified ADHD type  F90.9 314.01        Cardiac risk stratification-   Patient at low risk for adverse cardiovascular event with moderate orthopedic surgery and very low risk endoscopy with MAC/anesthesia. Patient is without symptoms of congestive heart failure nor obstructive coronary artery disease. Longstanding history of inappropriate sinus tachycardia. Recommend to proceed with surgery without any further cardiovascular testing. Orthopedic surgery MD NELL Holliday AT [facility]      Inappropriate sinus tachycardia-metoprolol succinate has been changed to propranolol 80 mg daily. Patient is also using 50 mg of CBD daily which has improved her inappropriate sinus tachycardia. 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 Recommend patient be evaluated for EDS, since this may change long-term follow-up of her aorta      Primary Care Physician- Myra Lugo MD    Follow-up 1 year    Subjective:  Presents the office today for preoperative examination. Patient is scheduled to undergo right hip replacement with [facility] for management of right hip dysplasia.   She also is scheduled to have EGD and colonoscopy with MAC/anesthesia for assessment of someone underlying gastrointestinal issues. Patient has a history of inappropriate sinus tachycardia. She continues on propranolol 80 mg daily along with CBD of 50 mg daily prescribed by primary care physician which is improved her sinus tachycardia. She has sympathetically driven inappropriate sinus tachycardia. She is without symptoms of congestive heart failure. Past Medical History:   Diagnosis Date    ADHD (attention deficit hyperactivity disorder) 2009    dx Dr. Mordecai Blizzard in [address]  Carnitine deficiency [facility])     Chronic pain     from myositis, myopathy    FH: rheumatoid arthritis 10/1/2013    Fibromyalgia     HSP (Henoch Schonlein purpura) ([facility] [address] [address]     occurs w myopathy flares    HSP (Henoch Schonlein purpura) ([facility] [address] 12/24/2014    Muscle pain     Muscle weakness     Myopathy 2005    Dr. Lorraine Giron, Dr. Leonel Chakraborty, genetic/metabolic? mitochondrial disorder,  [facility] muscle biopsy    Tiredness     VSD (ventricular septal defect)     spontaneous closure        Past Surgical History:   Procedure Laterality Date    HX ACL RECONSTRUCTION  2008    field hockey injury    HX ORTHOPAEDIC      ACL REPAIR    HX SKIN BIOPSY      MUSCLE BIOPSY X 2    [place]2010    [place]    MUS[place]OPSY  ~2007    [place]         Current Outpatient Medications:     propranolol LA (INDERAL LA) 80 mg SR capsule, Take  by mouth daily. , Disp: , Rfl:     ALPRAZolam (Xanax XR) 1 mg XR tablet, Take  by mouth every morning., Disp: , Rfl:     diclofenac EC (VOLTAREN) 75 mg EC tablet, Take 75 mg by mouth two (2) times a day., Disp: , Rfl:     levOCARNitine Tartrate (L-CARNITINE, TARTRATE,) 500 mg cap, Take 1,500 mg by mouth three (3) times daily. , Disp: , Rfl:     predniSONE (DELTASONE) 20 mg tablet, 2 qd for 4 d. 1 1/2 qd for 4 d. 1 qd for 4 d, 1/2 qd for 4 d., Disp: 20 Tab, Rfl: 0    PNV72-iron carb,glu-FA-dss-dha (CITRANATAL 90 DHA, ALGAL OIL,) 90 mg iron-1 mg -50 mg-300 mg cmpk, daily. , Disp: , Rfl:     gabapentin (NEURONTIN) 600 mg tablet, Take 300 mg by mouth daily. , Disp: , Rfl:     ALPRAZolam (XANAX) 0.5 mg tablet, Take 1 mg by mouth three (3) times daily as needed for Anxiety. , Disp: , Rfl:     folic acid (FOLVITE) 1 mg tablet, Take 1 mg by mouth daily. , Disp: , Rfl:     oxyCODONE IR (ROXICODONE) 10 mg tab immediate release tablet, Take 10 mg by mouth as needed. , Disp: , Rfl:     Lisdexamfetamine (VYVANSE) 70 mg capsule, Take 1 Cap by mouth every morning. Fill 1/28/14, Disp: 30 Cap, Rfl: 0    methocarbamol (ROBAXIN-750) 750 mg tablet, Take 1 Tab by mouth four (4) times daily. , Disp: 30 Tab, Rfl: 0    clonazePAM (KLONOPIN) 1 mg tablet, nightly as needed. , Disp: , Rfl:     ivabradine (CORLANOR) 5 mg tablet, Take 1 Tab by mouth two (2) times daily (with meals). , Disp: 60 Tab, Rfl: 5    metoprolol succinate (TOPROL-XL) 100 mg tablet, Take 1 Tab by mouth daily. (Patient not taking: Reported on 12/1/2020), Disp: 30 Tab, Rfl: 6    DEXTROAMPHETAMINE/AMPHETAMINE (AMPHETAMINE SALT COMBO PO), Take 10 mg by mouth every evening., Disp: , Rfl:     Levocarnitine Tartrate (L-CARNITINE) 500 mg Cap, Take 10,000 mg by mouth daily.  (Patient not taking: Reported on 12/6/2018), Disp: 2000 Cap, Rfl: 0    Allergies   Allergen Reactions    Ceclor Cd Hives    Ceclor [Cefaclor] Hives        Family History   Problem Relation Age of Onset    Diabetes Father     Heart Disease Father     Diabetes Mother    [facility] Arthritis-rheumatoid Mother     Heart Disease Mother     Diabetes Paternal Grandmother     Diabetes Paternal Grandfather     Diabetes Maternal Grandmother     Breast Cancer Other         1 great aunts       Social History     Tobacco Use    Smoking status: Never Smoker    Smokeless tobacco: Never Used   Substance Use Topics    Alcohol use: No     Alcohol/week: 2.5 standard drinks     Types: 3 Standard drinks or equivalent per week Comment: occasionally    Drug use: No       Review of Symptoms:  Pertinent Positive: Palpitations  Pertinent Negative: No chest pain shortness of breath  All Other systems reviewed and are negative for a Comprehensive ROS (10+)    Physical Exam    Blood pressure 128/68, pulse (!) 120, weight 194 lb 9.6 oz (88.3 kg), SpO2 96 %. Constitutional:  well-developed and well-nourished. Appears very agitated. HENT: Normocephalic. Eyes: No scleral icterus. Neck:  Neck supple. No JVD present. Pulmonary/Chest: Effort normal and breath sounds normal. No respiratory distress, wheezes or rales. Cardiovascular: Tachycardic, regular rhythm, S1 S2 . Exam reveals no gallop and no friction rub. No murmur heard. No edema. Extremities:  Normal muscle tone  Abdominal:   No abnormal distension. Neurological:  Moving all extremities, cranial nerves appear grossly intact. Skin: Skin is not cold. Not diaphoretic. No erythema. Psychiatric:  Grossly normal mood and affect. Intact insight.     Objective Data:    ECG: personally reviewed and  intrepreted  Sinus tachycardia with PACs    12/1/2020, sinus tachycardia otherwise normal electrocardiogram                 Javier He, DO

## 2021-02-16 ENCOUNTER — TRANSCRIBE ORDER (OUTPATIENT)
Dept: SCHEDULING | Age: 31
End: 2021-02-16

## 2021-02-16 DIAGNOSIS — M54.12 RADICULOPATHY, CERVICAL: ICD-10-CM

## 2021-02-16 DIAGNOSIS — M54.41 LOW BACK PAIN WITH BILATERAL SCIATICA: Primary | ICD-10-CM

## 2021-02-16 DIAGNOSIS — M54.42 LOW BACK PAIN WITH BILATERAL SCIATICA: Primary | ICD-10-CM

## 2021-03-02 NOTE — PERIOP NOTES
SPOKE WITH PATIENT TO SET UP COVID TEST. SHE SAID SHE HAD CT SCAN OF LUMBAR SPINE TODAY AT \"[facility]\" AND IS WAITING FOR THOSE RESULTS BEFORE SHE'LL SCHEDULE COVID TEST. INSTRUCTED PATIENT TO CALL US ONCE SHE KNOWS SHE WILL BE PROCEEDING WITH SURGERY, SO WE CAN SET UP COVID TEST, AS WE'VE BEEN CALLING HER DAILY WITHOUT SUCCESS. PATIENT INQUIRED ABOUT HAVING COVID TEST DONE AFTER HER SURGERY, BEFORE SHE LEAVES THE HOSPITAL AND SAID SHE WILL NOT HAVE SURGERY UNLESS THEY TEST HER FOR COVID PRIOR TO DISCHARGE, DUE TO HER LIVING WITH HER 80YEAR OLD GRANDMOTHER.     PT. ADVISED TO CALL DR. MAGDALENO'S OFFICE WITH COVID TESTING CONCERNS POSTOPERATIVELY.

## 2021-03-04 ENCOUNTER — TRANSCRIBE ORDER (OUTPATIENT)
Dept: REGISTRATION | Age: 31
End: 2021-03-04

## 2021-03-04 DIAGNOSIS — Z01.812 PRE-PROCEDURE LAB EXAM: Primary | ICD-10-CM

## 2021-03-06 ENCOUNTER — HOSPITAL ENCOUNTER (OUTPATIENT)
Dept: PREADMISSION TESTING | Age: 31
Discharge: HOME | End: 2021-03-06
Payer: COMMERCIAL

## 2021-03-06 DIAGNOSIS — Z01.812 PRE-PROCEDURE LAB EXAM: ICD-10-CM

## 2021-03-06 PROCEDURE — U0003 INFECTIOUS AGENT DETECTION BY NUCLEIC ACID (DNA OR RNA); SEVERE ACUTE RESPIRATORY SYNDROME CORONAVIRUS 2 (SARS-COV-2) (CORONAVIRUS DISEASE [COVID-19]), AMPLIFIED PROBE TECHNIQUE, MAKING USE OF HIGH THROUGHPUT TECHNOLOGIES AS DESCRIBED BY CMS-2020-01-R: HCPCS

## 2021-03-07 LAB — SARS-COV-2, COV2NT: NOT DETECTED

## 2021-03-09 ENCOUNTER — ANESTHESIA EVENT (OUTPATIENT)
Dept: SURGERY | Age: 31
End: 2021-03-09
Payer: COMMERCIAL

## 2021-03-10 ENCOUNTER — APPOINTMENT (OUTPATIENT)
Dept: GENERAL RADIOLOGY | Age: 31
End: 2021-03-10
Attending: ORTHOPAEDIC SURGERY
Payer: COMMERCIAL

## 2021-03-10 ENCOUNTER — HOSPITAL ENCOUNTER (OUTPATIENT)
Age: 31
Discharge: LEFT BEFORE DISCHARGE | End: 2021-03-10
Attending: ORTHOPAEDIC SURGERY | Admitting: ORTHOPAEDIC SURGERY
Payer: COMMERCIAL

## 2021-03-10 ENCOUNTER — ANESTHESIA (OUTPATIENT)
Dept: SURGERY | Age: 31
End: 2021-03-10
Payer: COMMERCIAL

## 2021-03-10 VITALS
TEMPERATURE: 97.6 F | HEIGHT: 64 IN | BODY MASS INDEX: 33.23 KG/M2 | RESPIRATION RATE: 18 BRPM | OXYGEN SATURATION: 99 % | DIASTOLIC BLOOD PRESSURE: 86 MMHG | WEIGHT: 194.67 LBS | HEART RATE: 84 BPM | SYSTOLIC BLOOD PRESSURE: 112 MMHG

## 2021-03-10 PROBLEM — S73.191A LABRAL TEAR OF RIGHT HIP JOINT: Status: ACTIVE | Noted: 2021-03-10

## 2021-03-10 LAB
GLUCOSE BLD STRIP.AUTO-MCNC: 104 MG/DL (ref 65–100)
HCG UR QL: NEGATIVE
SERVICE CMNT-IMP: ABNORMAL

## 2021-03-10 PROCEDURE — 74011000258 HC RX REV CODE- 258: Performed by: ANESTHESIOLOGY

## 2021-03-10 PROCEDURE — 74011250636 HC RX REV CODE- 250/636: Performed by: ORTHOPAEDIC SURGERY

## 2021-03-10 PROCEDURE — 2709999900 HC NON-CHARGEABLE SUPPLY: Performed by: ORTHOPAEDIC SURGERY

## 2021-03-10 PROCEDURE — 76010000132 HC OR TIME 2.5 TO 3 HR: Performed by: ORTHOPAEDIC SURGERY

## 2021-03-10 PROCEDURE — 77030025630 HC BUR SHV ABRD S&N -C: Performed by: ORTHOPAEDIC SURGERY

## 2021-03-10 PROCEDURE — 74011000250 HC RX REV CODE- 250: Performed by: NURSE ANESTHETIST, CERTIFIED REGISTERED

## 2021-03-10 PROCEDURE — 77030008753 HC TU IRR IN/OUT FLO S&N -B: Performed by: ORTHOPAEDIC SURGERY

## 2021-03-10 PROCEDURE — 77030016555 HC BLD SHV INCIS4 S&N -B: Performed by: ORTHOPAEDIC SURGERY

## 2021-03-10 PROCEDURE — 81025 URINE PREGNANCY TEST: CPT

## 2021-03-10 PROCEDURE — 74011000250 HC RX REV CODE- 250: Performed by: ANESTHESIOLOGY

## 2021-03-10 PROCEDURE — C1713 ANCHOR/SCREW BN/BN,TIS/BN: HCPCS | Performed by: ORTHOPAEDIC SURGERY

## 2021-03-10 PROCEDURE — 74011250637 HC RX REV CODE- 250/637: Performed by: PHYSICIAN ASSISTANT

## 2021-03-10 PROCEDURE — 74011250636 HC RX REV CODE- 250/636: Performed by: PHYSICIAN ASSISTANT

## 2021-03-10 PROCEDURE — 82962 GLUCOSE BLOOD TEST: CPT

## 2021-03-10 PROCEDURE — 74011250636 HC RX REV CODE- 250/636: Performed by: ANESTHESIOLOGY

## 2021-03-10 PROCEDURE — 76210000001 HC OR PH I REC 2.5 TO 3 HR: Performed by: ORTHOPAEDIC SURGERY

## 2021-03-10 PROCEDURE — 77030040922 HC BLNKT HYPOTHRM STRY -A

## 2021-03-10 PROCEDURE — 77030020788: Performed by: ORTHOPAEDIC SURGERY

## 2021-03-10 PROCEDURE — 77030037873 HC SUT SHTTL ACCU PSS SN -F: Performed by: ORTHOPAEDIC SURGERY

## 2021-03-10 PROCEDURE — 76060000036 HC ANESTHESIA 2.5 TO 3 HR: Performed by: ORTHOPAEDIC SURGERY

## 2021-03-10 PROCEDURE — 77030018835 HC SOL IRR LR ICUM -A: Performed by: ORTHOPAEDIC SURGERY

## 2021-03-10 PROCEDURE — 74011250636 HC RX REV CODE- 250/636

## 2021-03-10 PROCEDURE — C1769 GUIDE WIRE: HCPCS | Performed by: ORTHOPAEDIC SURGERY

## 2021-03-10 PROCEDURE — 74011250636 HC RX REV CODE- 250/636: Performed by: NURSE ANESTHETIST, CERTIFIED REGISTERED

## 2021-03-10 PROCEDURE — 77030003665 HC NDL SPN BBMI -A: Performed by: ORTHOPAEDIC SURGERY

## 2021-03-10 PROCEDURE — 74011000250 HC RX REV CODE- 250: Performed by: ORTHOPAEDIC SURGERY

## 2021-03-10 PROCEDURE — 77030010428: Performed by: ORTHOPAEDIC SURGERY

## 2021-03-10 PROCEDURE — 77030029352 HC BLD FLL RAD SAMURAI STRY -C: Performed by: ORTHOPAEDIC SURGERY

## 2021-03-10 PROCEDURE — 77030013079 HC BLNKT BAIR HGGR 3M -A: Performed by: ANESTHESIOLOGY

## 2021-03-10 PROCEDURE — 77030021162 HC TU IRR ENDOSC BAXT -A: Performed by: ORTHOPAEDIC SURGERY

## 2021-03-10 PROCEDURE — 77030002916 HC SUT ETHLN J&J -A: Performed by: ORTHOPAEDIC SURGERY

## 2021-03-10 PROCEDURE — 77030007866 HC KT SPN ANES BBMI -B: Performed by: ANESTHESIOLOGY

## 2021-03-10 DEVICE — QFIX 1.8 MINI SUTURE ANCHOR
Type: IMPLANTABLE DEVICE | Site: HIP | Status: FUNCTIONAL
Brand: Q-FIX

## 2021-03-10 RX ORDER — DIAZEPAM 2 MG/1
5 TABLET ORAL
Status: DISCONTINUED | OUTPATIENT
Start: 2021-03-10 | End: 2021-03-11 | Stop reason: HOSPADM

## 2021-03-10 RX ORDER — FENTANYL CITRATE 50 UG/ML
50 INJECTION, SOLUTION INTRAMUSCULAR; INTRAVENOUS AS NEEDED
Status: COMPLETED | OUTPATIENT
Start: 2021-03-10 | End: 2021-03-10

## 2021-03-10 RX ORDER — CELECOXIB 200 MG/1
200 CAPSULE ORAL ONCE
Status: COMPLETED | OUTPATIENT
Start: 2021-03-10 | End: 2021-03-10

## 2021-03-10 RX ORDER — FACIAL-BODY WIPES
10 EACH TOPICAL DAILY PRN
Status: DISCONTINUED | OUTPATIENT
Start: 2021-03-10 | End: 2021-03-11 | Stop reason: HOSPADM

## 2021-03-10 RX ORDER — GABAPENTIN 600 MG/1
300 TABLET ORAL DAILY
Status: DISCONTINUED | OUTPATIENT
Start: 2021-03-11 | End: 2021-03-10

## 2021-03-10 RX ORDER — SODIUM CHLORIDE 0.9 % (FLUSH) 0.9 %
5-40 SYRINGE (ML) INJECTION AS NEEDED
Status: DISCONTINUED | OUTPATIENT
Start: 2021-03-10 | End: 2021-03-11 | Stop reason: HOSPADM

## 2021-03-10 RX ORDER — HYDROMORPHONE HYDROCHLORIDE 2 MG/ML
2 INJECTION, SOLUTION INTRAMUSCULAR; INTRAVENOUS; SUBCUTANEOUS
Status: DISCONTINUED | OUTPATIENT
Start: 2021-03-10 | End: 2021-03-11 | Stop reason: HOSPADM

## 2021-03-10 RX ORDER — OXYCODONE HYDROCHLORIDE 5 MG/1
10 TABLET ORAL
Status: DISCONTINUED | OUTPATIENT
Start: 2021-03-10 | End: 2021-03-11 | Stop reason: HOSPADM

## 2021-03-10 RX ORDER — SODIUM CHLORIDE 0.9 % (FLUSH) 0.9 %
5-40 SYRINGE (ML) INJECTION EVERY 8 HOURS
Status: DISCONTINUED | OUTPATIENT
Start: 2021-03-10 | End: 2021-03-10 | Stop reason: HOSPADM

## 2021-03-10 RX ORDER — MIDAZOLAM HYDROCHLORIDE 1 MG/ML
1 INJECTION, SOLUTION INTRAMUSCULAR; INTRAVENOUS AS NEEDED
Status: DISCONTINUED | OUTPATIENT
Start: 2021-03-10 | End: 2021-03-10 | Stop reason: HOSPADM

## 2021-03-10 RX ORDER — SODIUM CHLORIDE 0.9 % (FLUSH) 0.9 %
5-40 SYRINGE (ML) INJECTION AS NEEDED
Status: DISCONTINUED | OUTPATIENT
Start: 2021-03-10 | End: 2021-03-10 | Stop reason: HOSPADM

## 2021-03-10 RX ORDER — OXYCODONE HCL 10 MG/1
10 TABLET, FILM COATED, EXTENDED RELEASE ORAL EVERY 12 HOURS
Status: DISCONTINUED | OUTPATIENT
Start: 2021-03-10 | End: 2021-03-11 | Stop reason: HOSPADM

## 2021-03-10 RX ORDER — POLYETHYLENE GLYCOL 3350 17 G/17G
17 POWDER, FOR SOLUTION ORAL DAILY
Status: DISCONTINUED | OUTPATIENT
Start: 2021-03-11 | End: 2021-03-11 | Stop reason: HOSPADM

## 2021-03-10 RX ORDER — ACETAMINOPHEN 325 MG/1
650 TABLET ORAL ONCE
Status: DISCONTINUED | OUTPATIENT
Start: 2021-03-10 | End: 2021-03-10 | Stop reason: HOSPADM

## 2021-03-10 RX ORDER — SODIUM CHLORIDE 9 MG/ML
125 INJECTION, SOLUTION INTRAVENOUS CONTINUOUS
Status: DISCONTINUED | OUTPATIENT
Start: 2021-03-10 | End: 2021-03-11 | Stop reason: HOSPADM

## 2021-03-10 RX ORDER — OXYCODONE HYDROCHLORIDE 5 MG/1
10 TABLET ORAL 2 TIMES DAILY
Status: DISCONTINUED | OUTPATIENT
Start: 2021-03-10 | End: 2021-03-11 | Stop reason: HOSPADM

## 2021-03-10 RX ORDER — SODIUM CHLORIDE 0.9 % (FLUSH) 0.9 %
5-40 SYRINGE (ML) INJECTION EVERY 8 HOURS
Status: DISCONTINUED | OUTPATIENT
Start: 2021-03-10 | End: 2021-03-11 | Stop reason: HOSPADM

## 2021-03-10 RX ORDER — BUPIVACAINE HYDROCHLORIDE 5 MG/ML
INJECTION, SOLUTION EPIDURAL; INTRACAUDAL
Status: COMPLETED | OUTPATIENT
Start: 2021-03-10 | End: 2021-03-10

## 2021-03-10 RX ORDER — LORAZEPAM 2 MG/ML
1 INJECTION INTRAMUSCULAR
Status: DISCONTINUED | OUTPATIENT
Start: 2021-03-10 | End: 2021-03-10

## 2021-03-10 RX ORDER — HYDROMORPHONE HYDROCHLORIDE 1 MG/ML
0.5 INJECTION, SOLUTION INTRAMUSCULAR; INTRAVENOUS; SUBCUTANEOUS
Status: DISCONTINUED | OUTPATIENT
Start: 2021-03-10 | End: 2021-03-10

## 2021-03-10 RX ORDER — AMOXICILLIN 250 MG
1 CAPSULE ORAL 2 TIMES DAILY
Status: DISCONTINUED | OUTPATIENT
Start: 2021-03-10 | End: 2021-03-11 | Stop reason: HOSPADM

## 2021-03-10 RX ORDER — MORPHINE SULFATE 2 MG/ML
2 INJECTION, SOLUTION INTRAMUSCULAR; INTRAVENOUS
Status: DISCONTINUED | OUTPATIENT
Start: 2021-03-10 | End: 2021-03-10 | Stop reason: HOSPADM

## 2021-03-10 RX ORDER — CYCLOBENZAPRINE HCL 10 MG
10 TABLET ORAL 2 TIMES DAILY
Status: DISCONTINUED | OUTPATIENT
Start: 2021-03-10 | End: 2021-03-11 | Stop reason: HOSPADM

## 2021-03-10 RX ORDER — MIDAZOLAM HYDROCHLORIDE 1 MG/ML
0.5 INJECTION, SOLUTION INTRAMUSCULAR; INTRAVENOUS
Status: COMPLETED | OUTPATIENT
Start: 2021-03-10 | End: 2021-03-10

## 2021-03-10 RX ORDER — VANCOMYCIN/0.9 % SOD CHLORIDE 1.5G/250ML
1500 PLASTIC BAG, INJECTION (ML) INTRAVENOUS ONCE
Status: COMPLETED | OUTPATIENT
Start: 2021-03-10 | End: 2021-03-10

## 2021-03-10 RX ORDER — ONDANSETRON 2 MG/ML
4 INJECTION INTRAMUSCULAR; INTRAVENOUS AS NEEDED
Status: DISCONTINUED | OUTPATIENT
Start: 2021-03-10 | End: 2021-03-10 | Stop reason: HOSPADM

## 2021-03-10 RX ORDER — LORAZEPAM 2 MG/ML
INJECTION INTRAMUSCULAR
Status: COMPLETED
Start: 2021-03-10 | End: 2021-03-10

## 2021-03-10 RX ORDER — BUPIVACAINE HYDROCHLORIDE 7.5 MG/ML
INJECTION, SOLUTION EPIDURAL; RETROBULBAR AS NEEDED
Status: DISCONTINUED | OUTPATIENT
Start: 2021-03-10 | End: 2021-03-10

## 2021-03-10 RX ORDER — HYDROMORPHONE HYDROCHLORIDE 1 MG/ML
0.2 INJECTION, SOLUTION INTRAMUSCULAR; INTRAVENOUS; SUBCUTANEOUS
Status: DISCONTINUED | OUTPATIENT
Start: 2021-03-10 | End: 2021-03-10 | Stop reason: HOSPADM

## 2021-03-10 RX ORDER — GLYCOPYRROLATE 0.2 MG/ML
INJECTION INTRAMUSCULAR; INTRAVENOUS AS NEEDED
Status: DISCONTINUED | OUTPATIENT
Start: 2021-03-10 | End: 2021-03-10 | Stop reason: HOSPADM

## 2021-03-10 RX ORDER — MIDAZOLAM HYDROCHLORIDE 1 MG/ML
INJECTION, SOLUTION INTRAMUSCULAR; INTRAVENOUS
Status: COMPLETED
Start: 2021-03-10 | End: 2021-03-10

## 2021-03-10 RX ORDER — ALPRAZOLAM 0.25 MG/1
1 TABLET ORAL 3 TIMES DAILY
Status: DISCONTINUED | OUTPATIENT
Start: 2021-03-10 | End: 2021-03-10

## 2021-03-10 RX ORDER — MIDAZOLAM HYDROCHLORIDE 1 MG/ML
1 INJECTION, SOLUTION INTRAMUSCULAR; INTRAVENOUS
Status: DISCONTINUED | OUTPATIENT
Start: 2021-03-10 | End: 2021-03-10 | Stop reason: HOSPADM

## 2021-03-10 RX ORDER — SODIUM CHLORIDE, SODIUM LACTATE, POTASSIUM CHLORIDE, CALCIUM CHLORIDE 600; 310; 30; 20 MG/100ML; MG/100ML; MG/100ML; MG/100ML
INJECTION, SOLUTION INTRAVENOUS
Status: DISCONTINUED | OUTPATIENT
Start: 2021-03-10 | End: 2021-03-10 | Stop reason: HOSPADM

## 2021-03-10 RX ORDER — KETOROLAC TROMETHAMINE 30 MG/ML
15 INJECTION, SOLUTION INTRAMUSCULAR; INTRAVENOUS EVERY 6 HOURS
Status: DISCONTINUED | OUTPATIENT
Start: 2021-03-10 | End: 2021-03-11 | Stop reason: HOSPADM

## 2021-03-10 RX ORDER — METHOCARBAMOL 750 MG/1
750 TABLET, FILM COATED ORAL 4 TIMES DAILY
Status: DISCONTINUED | OUTPATIENT
Start: 2021-03-10 | End: 2021-03-10

## 2021-03-10 RX ORDER — ALPRAZOLAM 1 MG/1
1 TABLET ORAL
Status: DISCONTINUED | OUTPATIENT
Start: 2021-03-10 | End: 2021-03-10

## 2021-03-10 RX ORDER — SODIUM CHLORIDE 9 MG/ML
25 INJECTION, SOLUTION INTRAVENOUS CONTINUOUS
Status: DISCONTINUED | OUTPATIENT
Start: 2021-03-10 | End: 2021-03-10 | Stop reason: HOSPADM

## 2021-03-10 RX ORDER — DEXMEDETOMIDINE HYDROCHLORIDE 100 UG/ML
INJECTION, SOLUTION INTRAVENOUS AS NEEDED
Status: DISCONTINUED | OUTPATIENT
Start: 2021-03-10 | End: 2021-03-10 | Stop reason: HOSPADM

## 2021-03-10 RX ORDER — ASPIRIN 81 MG/1
81 TABLET ORAL 2 TIMES DAILY
Status: DISCONTINUED | OUTPATIENT
Start: 2021-03-10 | End: 2021-03-11 | Stop reason: HOSPADM

## 2021-03-10 RX ORDER — MIDAZOLAM HYDROCHLORIDE 1 MG/ML
2 INJECTION, SOLUTION INTRAMUSCULAR; INTRAVENOUS
Status: DISCONTINUED | OUTPATIENT
Start: 2021-03-10 | End: 2021-03-10 | Stop reason: HOSPADM

## 2021-03-10 RX ORDER — FENTANYL CITRATE 50 UG/ML
25 INJECTION, SOLUTION INTRAMUSCULAR; INTRAVENOUS
Status: COMPLETED | OUTPATIENT
Start: 2021-03-10 | End: 2021-03-10

## 2021-03-10 RX ORDER — PROPRANOLOL HYDROCHLORIDE 80 MG/1
80 CAPSULE, EXTENDED RELEASE ORAL DAILY
Status: DISCONTINUED | OUTPATIENT
Start: 2021-03-11 | End: 2021-03-11 | Stop reason: HOSPADM

## 2021-03-10 RX ORDER — SODIUM CHLORIDE, SODIUM LACTATE, POTASSIUM CHLORIDE, CALCIUM CHLORIDE 600; 310; 30; 20 MG/100ML; MG/100ML; MG/100ML; MG/100ML
125 INJECTION, SOLUTION INTRAVENOUS CONTINUOUS
Status: DISCONTINUED | OUTPATIENT
Start: 2021-03-10 | End: 2021-03-10 | Stop reason: HOSPADM

## 2021-03-10 RX ORDER — HYDROXYZINE HYDROCHLORIDE 10 MG/1
10 TABLET, FILM COATED ORAL
Status: DISCONTINUED | OUTPATIENT
Start: 2021-03-10 | End: 2021-03-11 | Stop reason: HOSPADM

## 2021-03-10 RX ORDER — ONDANSETRON 2 MG/ML
4 INJECTION INTRAMUSCULAR; INTRAVENOUS
Status: DISCONTINUED | OUTPATIENT
Start: 2021-03-10 | End: 2021-03-11 | Stop reason: HOSPADM

## 2021-03-10 RX ORDER — ACETAMINOPHEN 325 MG/1
650 TABLET ORAL EVERY 6 HOURS
Status: DISCONTINUED | OUTPATIENT
Start: 2021-03-10 | End: 2021-03-11 | Stop reason: HOSPADM

## 2021-03-10 RX ORDER — PROPOFOL 10 MG/ML
INJECTION, EMULSION INTRAVENOUS
Status: DISCONTINUED | OUTPATIENT
Start: 2021-03-10 | End: 2021-03-10 | Stop reason: HOSPADM

## 2021-03-10 RX ORDER — GABAPENTIN 600 MG/1
600 TABLET ORAL 3 TIMES DAILY
Status: DISCONTINUED | OUTPATIENT
Start: 2021-03-10 | End: 2021-03-11 | Stop reason: HOSPADM

## 2021-03-10 RX ORDER — KETAMINE HYDROCHLORIDE 50 MG/ML
INJECTION, SOLUTION INTRAMUSCULAR; INTRAVENOUS AS NEEDED
Status: DISCONTINUED | OUTPATIENT
Start: 2021-03-10 | End: 2021-03-10 | Stop reason: HOSPADM

## 2021-03-10 RX ORDER — NALOXONE HYDROCHLORIDE 0.4 MG/ML
0.4 INJECTION, SOLUTION INTRAMUSCULAR; INTRAVENOUS; SUBCUTANEOUS AS NEEDED
Status: DISCONTINUED | OUTPATIENT
Start: 2021-03-10 | End: 2021-03-11 | Stop reason: HOSPADM

## 2021-03-10 RX ORDER — CLONAZEPAM 1 MG/1
1 TABLET ORAL
Status: DISCONTINUED | OUTPATIENT
Start: 2021-03-10 | End: 2021-03-10

## 2021-03-10 RX ORDER — OXYCODONE AND ACETAMINOPHEN 5; 325 MG/1; MG/1
1 TABLET ORAL AS NEEDED
Status: DISCONTINUED | OUTPATIENT
Start: 2021-03-10 | End: 2021-03-10 | Stop reason: HOSPADM

## 2021-03-10 RX ORDER — ACETAMINOPHEN 500 MG
1000 TABLET ORAL ONCE
Status: COMPLETED | OUTPATIENT
Start: 2021-03-10 | End: 2021-03-10

## 2021-03-10 RX ORDER — HYDROMORPHONE HYDROCHLORIDE 1 MG/ML
0.5 INJECTION, SOLUTION INTRAMUSCULAR; INTRAVENOUS; SUBCUTANEOUS
Status: DISCONTINUED | OUTPATIENT
Start: 2021-03-10 | End: 2021-03-10 | Stop reason: HOSPADM

## 2021-03-10 RX ORDER — MIDAZOLAM HYDROCHLORIDE 1 MG/ML
INJECTION, SOLUTION INTRAMUSCULAR; INTRAVENOUS AS NEEDED
Status: DISCONTINUED | OUTPATIENT
Start: 2021-03-10 | End: 2021-03-10 | Stop reason: HOSPADM

## 2021-03-10 RX ORDER — FAMOTIDINE 20 MG/1
20 TABLET, FILM COATED ORAL 2 TIMES DAILY
Status: DISCONTINUED | OUTPATIENT
Start: 2021-03-10 | End: 2021-03-11 | Stop reason: HOSPADM

## 2021-03-10 RX ORDER — PREGABALIN 75 MG/1
75 CAPSULE ORAL ONCE
Status: COMPLETED | OUTPATIENT
Start: 2021-03-10 | End: 2021-03-10

## 2021-03-10 RX ORDER — OXYCODONE HYDROCHLORIDE 5 MG/1
10 TABLET ORAL
Status: DISCONTINUED | OUTPATIENT
Start: 2021-03-10 | End: 2021-03-10

## 2021-03-10 RX ORDER — PROPOFOL 10 MG/ML
INJECTION, EMULSION INTRAVENOUS AS NEEDED
Status: DISCONTINUED | OUTPATIENT
Start: 2021-03-10 | End: 2021-03-10 | Stop reason: HOSPADM

## 2021-03-10 RX ORDER — DIPHENHYDRAMINE HYDROCHLORIDE 50 MG/ML
12.5 INJECTION, SOLUTION INTRAMUSCULAR; INTRAVENOUS AS NEEDED
Status: DISCONTINUED | OUTPATIENT
Start: 2021-03-10 | End: 2021-03-10 | Stop reason: HOSPADM

## 2021-03-10 RX ORDER — BUPIVACAINE HYDROCHLORIDE AND EPINEPHRINE 5; 5 MG/ML; UG/ML
INJECTION, SOLUTION EPIDURAL; INTRACAUDAL; PERINEURAL AS NEEDED
Status: DISCONTINUED | OUTPATIENT
Start: 2021-03-10 | End: 2021-03-10 | Stop reason: HOSPADM

## 2021-03-10 RX ORDER — LIDOCAINE HYDROCHLORIDE 10 MG/ML
0.1 INJECTION, SOLUTION EPIDURAL; INFILTRATION; INTRACAUDAL; PERINEURAL AS NEEDED
Status: DISCONTINUED | OUTPATIENT
Start: 2021-03-10 | End: 2021-03-10 | Stop reason: HOSPADM

## 2021-03-10 RX ADMIN — PROPOFOL 50 MG: 10 INJECTION, EMULSION INTRAVENOUS at 10:27

## 2021-03-10 RX ADMIN — MIDAZOLAM 0.5 MG: 1 INJECTION INTRAMUSCULAR; INTRAVENOUS at 11:15

## 2021-03-10 RX ADMIN — MIDAZOLAM 0.5 MG: 1 INJECTION INTRAMUSCULAR; INTRAVENOUS at 13:34

## 2021-03-10 RX ADMIN — MIDAZOLAM 2 MG: 1 INJECTION INTRAMUSCULAR; INTRAVENOUS at 09:58

## 2021-03-10 RX ADMIN — PROPOFOL 50 MG: 10 INJECTION, EMULSION INTRAVENOUS at 10:19

## 2021-03-10 RX ADMIN — FENTANYL CITRATE 25 MCG: 50 INJECTION, SOLUTION INTRAMUSCULAR; INTRAVENOUS at 13:14

## 2021-03-10 RX ADMIN — KETAMINE HYDROCHLORIDE 50 MG: 50 INJECTION, SOLUTION INTRAMUSCULAR; INTRAVENOUS at 14:02

## 2021-03-10 RX ADMIN — SODIUM CHLORIDE, POTASSIUM CHLORIDE, SODIUM LACTATE AND CALCIUM CHLORIDE: 600; 310; 30; 20 INJECTION, SOLUTION INTRAVENOUS at 10:08

## 2021-03-10 RX ADMIN — PROPOFOL 50 MG: 10 INJECTION, EMULSION INTRAVENOUS at 10:28

## 2021-03-10 RX ADMIN — FENTANYL CITRATE 25 MCG: 50 INJECTION, SOLUTION INTRAMUSCULAR; INTRAVENOUS at 13:35

## 2021-03-10 RX ADMIN — PROPOFOL 50 MG: 10 INJECTION, EMULSION INTRAVENOUS at 10:22

## 2021-03-10 RX ADMIN — DEXMEDETOMIDINE HYDROCHLORIDE 10 MCG: 100 INJECTION, SOLUTION, CONCENTRATE INTRAVENOUS at 10:36

## 2021-03-10 RX ADMIN — CYCLOBENZAPRINE 10 MG: 10 TABLET, FILM COATED ORAL at 16:24

## 2021-03-10 RX ADMIN — MIDAZOLAM 0.5 MG: 1 INJECTION INTRAMUSCULAR; INTRAVENOUS at 12:29

## 2021-03-10 RX ADMIN — CELECOXIB 200 MG: 200 CAPSULE ORAL at 09:30

## 2021-03-10 RX ADMIN — DEXMEDETOMIDINE HYDROCHLORIDE 5 MCG: 100 INJECTION, SOLUTION, CONCENTRATE INTRAVENOUS at 11:45

## 2021-03-10 RX ADMIN — MIDAZOLAM 0.5 MG: 1 INJECTION INTRAMUSCULAR; INTRAVENOUS at 13:30

## 2021-03-10 RX ADMIN — KETAMINE HYDROCHLORIDE 30 MG: 50 INJECTION, SOLUTION INTRAMUSCULAR; INTRAVENOUS at 10:23

## 2021-03-10 RX ADMIN — SODIUM CHLORIDE 125 ML/HR: 9 INJECTION, SOLUTION INTRAVENOUS at 14:43

## 2021-03-10 RX ADMIN — VANCOMYCIN HYDROCHLORIDE 1500 MG: 10 INJECTION, POWDER, LYOPHILIZED, FOR SOLUTION INTRAVENOUS at 09:58

## 2021-03-10 RX ADMIN — PROPOFOL 20 MG: 10 INJECTION, EMULSION INTRAVENOUS at 14:02

## 2021-03-10 RX ADMIN — ACETAMINOPHEN 1000 MG: 500 TABLET ORAL at 09:30

## 2021-03-10 RX ADMIN — MIDAZOLAM 1 MG: 1 INJECTION INTRAMUSCULAR; INTRAVENOUS at 14:15

## 2021-03-10 RX ADMIN — MIDAZOLAM 1 MG: 1 INJECTION INTRAMUSCULAR; INTRAVENOUS at 14:00

## 2021-03-10 RX ADMIN — MIDAZOLAM 0.5 MG: 1 INJECTION INTRAMUSCULAR; INTRAVENOUS at 13:15

## 2021-03-10 RX ADMIN — ACETAMINOPHEN 650 MG: 325 TABLET ORAL at 16:25

## 2021-03-10 RX ADMIN — HYDROMORPHONE HYDROCHLORIDE 0.5 MG: 1 INJECTION, SOLUTION INTRAMUSCULAR; INTRAVENOUS; SUBCUTANEOUS at 13:00

## 2021-03-10 RX ADMIN — PROPOFOL 75 MCG/KG/MIN: 10 INJECTION, EMULSION INTRAVENOUS at 10:30

## 2021-03-10 RX ADMIN — LORAZEPAM 1 MG: 2 INJECTION INTRAMUSCULAR; INTRAVENOUS at 14:00

## 2021-03-10 RX ADMIN — MIDAZOLAM 1 MG: 1 INJECTION INTRAMUSCULAR; INTRAVENOUS at 15:00

## 2021-03-10 RX ADMIN — PREGABALIN 75 MG: 75 CAPSULE ORAL at 09:30

## 2021-03-10 RX ADMIN — FENTANYL CITRATE 100 MCG: 50 INJECTION, SOLUTION INTRAMUSCULAR; INTRAVENOUS at 10:20

## 2021-03-10 RX ADMIN — KETAMINE HYDROCHLORIDE 20 MG: 50 INJECTION, SOLUTION INTRAMUSCULAR; INTRAVENOUS at 10:19

## 2021-03-10 RX ADMIN — PROPOFOL 50 MG: 10 INJECTION, EMULSION INTRAVENOUS at 10:33

## 2021-03-10 RX ADMIN — HYDROMORPHONE HYDROCHLORIDE 0.5 MG: 1 INJECTION, SOLUTION INTRAMUSCULAR; INTRAVENOUS; SUBCUTANEOUS at 13:03

## 2021-03-10 RX ADMIN — DEXMEDETOMIDINE HYDROCHLORIDE 20 MCG: 100 INJECTION, SOLUTION, CONCENTRATE INTRAVENOUS at 14:01

## 2021-03-10 RX ADMIN — OXYCODONE 10 MG: 5 TABLET ORAL at 16:59

## 2021-03-10 RX ADMIN — GABAPENTIN 600 MG: 600 TABLET, FILM COATED ORAL at 16:26

## 2021-03-10 RX ADMIN — FENTANYL CITRATE 25 MCG: 50 INJECTION, SOLUTION INTRAMUSCULAR; INTRAVENOUS at 13:26

## 2021-03-10 RX ADMIN — BUPIVACAINE HYDROCHLORIDE 12 MG: 5 INJECTION, SOLUTION EPIDURAL; INTRACAUDAL; PERINEURAL at 10:41

## 2021-03-10 RX ADMIN — MIDAZOLAM 0.5 MG: 1 INJECTION INTRAMUSCULAR; INTRAVENOUS at 11:38

## 2021-03-10 RX ADMIN — FENTANYL CITRATE 100 MCG: 50 INJECTION, SOLUTION INTRAMUSCULAR; INTRAVENOUS at 09:58

## 2021-03-10 RX ADMIN — MIDAZOLAM 0.5 MG: 1 INJECTION INTRAMUSCULAR; INTRAVENOUS at 12:05

## 2021-03-10 RX ADMIN — LORAZEPAM 1 MG: 2 INJECTION INTRAMUSCULAR; INTRAVENOUS at 14:16

## 2021-03-10 RX ADMIN — MIDAZOLAM 1 MG: 1 INJECTION INTRAMUSCULAR; INTRAVENOUS at 14:45

## 2021-03-10 RX ADMIN — DEXMEDETOMIDINE HYDROCHLORIDE 10 MCG: 100 INJECTION, SOLUTION, CONCENTRATE INTRAVENOUS at 10:19

## 2021-03-10 RX ADMIN — MIDAZOLAM 0.5 MG: 1 INJECTION INTRAMUSCULAR; INTRAVENOUS at 13:20

## 2021-03-10 RX ADMIN — PROPOFOL: 10 INJECTION, EMULSION INTRAVENOUS at 12:35

## 2021-03-10 RX ADMIN — GLYCOPYRROLATE 0.2 MG: 0.2 INJECTION, SOLUTION INTRAMUSCULAR; INTRAVENOUS at 12:08

## 2021-03-10 RX ADMIN — PHENYLEPHRINE HYDROCHLORIDE 50 MCG/MIN: 10 INJECTION INTRAVENOUS at 10:30

## 2021-03-10 RX ADMIN — FENTANYL CITRATE 25 MCG: 50 INJECTION, SOLUTION INTRAMUSCULAR; INTRAVENOUS at 13:19

## 2021-03-10 NOTE — PERIOP NOTES
TRANSFER - OUT REPORT:    Verbal report given to Harris(name) on [employer]  being transferred to [facility](unit) for routine post - op       Report consisted of patients Situation, Background, Assessment and   Recommendations(SBAR). Time Pre op antibiotic given:1200  Anesthesia Stop time: 1300  Aguilar Present on Transfer to [address] for Aguilar on Chart:NO  Discharge Prescriptions with Chart:NO    Information from the following report(s) Procedure Summary and Recent Results was reviewed with the receiving nurse. Opportunity for questions and clarification was provided. Is the patient on 02? YES       L/Min 2      Is the patient on a monitor? NO  Is the nurse transporting with the patient? NO    Surgical Waiting Area notified of patient's transfer from PACU? NO    ALLED FATHER ON PHONE and gave him an updatre and room numberC  The following personal items collected during your admission accompanied patient upon transfer:   Dental Appliance:    Vision:    Hearing Aid:    Jewelry: Jewelry: None  Clothing: Clothing: (clothing to [employer])  Other Valuables:  Other Valuables: Cell Phone  Valuables sent to safe:    Clothing, cell phone returened to pt in pacu

## 2021-03-10 NOTE — PROGRESS NOTES
Primary Nurse Carolyne Ackerman RN and Abad Gold RN performed a dual skin assessment on this patient No impairment noted  Vlad score is 20

## 2021-03-10 NOTE — OP NOTES
[address]  OPERATIVE REPORT    Name:  Donna Carbone  MR#:  [id number]  :  1990  ACCOUNT #:  [de-identified]  DATE OF SERVICE:  03/10/2021      PREOPERATIVE DIAGNOSIS:  Right hip labral tear secondary to mild dysplasia. POSTOPERATIVE DIAGNOSES:  1.  Labral tear, right hip. 2.  Grade IV changes in the acetabulum with grade III changes in the weightbearing zone of the femoral head. PROCEDURE PERFORMED:  Right hip arthroscopy with chondroplasty of the femoral head and acetabulum as well as repair of the labrum. SURGEON:  Gus Justin MD    ASSISTANT:  None. ANESTHESIA:  Spinal.    COMPLICATIONS:  None. SPECIMENS REMOVED:  None. IMPLANTS:  Joon Canard and nephew anchors x 2. ESTIMATED BLOOD LOSS:  Minimal.    INDICATIONS FOR PROCEDURE:  This is a 45-year-old female who has a fairly significant surgical history for her age including cervical disk replacement, lumbar fusion, who presented initially with severe hip pain. She had a workup including x-rays and MRI. She did have dysplasia. It was more severe on the left. Most of her symptoms involved her right hip. During her clinic exam, she could barely walk. MRI did show labral tear with mild dysplasia. There was no obvious subchondral edema of femoral head or acetabulum. I discussed these results and she was very much interested in pursuing intervention. We discussed hip arthroscopy for exploratory purposes as well as labral fixation. She understood this may not completely eliminate her pain. She wished to proceed. DESCRIPTION OF PROCEDURE:  The patient was identified in the preoperative holding area and the correct site was marked and confirmed. She was taken to the operating room and placed supine after spinal was induced. She was prepped and draped in standard sterile fashion. She received vancomycin prior to incision because of her allergies. Time-out was held and correct site was confirmed.   I performed an air arthrogram performed before using traction to minimize the traction force. Total traction time was approximately 1 hour and 15 minutes. Once the hip was subluxed, I introduced a spinal needle and then passed a nitinol guidewire. This was under fluoroscopy. I then introduced a second spinal needle under direct visualization through a modified mid anterior portal.  I then connected the portals for visualization purposes. There was a significantly large amount of debris throughout the hip making visualization very difficult. I switched out to a 5.0 cannula and then a significant amount of debris was suctioned. This included cartilage that was floating as well as degeneration of labral tissue. Once all this was cleared out, I then was able to perform a thorough examination of the hip. She has grade III changes in the femoral head. She had grade IV changes throughout the rim of the acetabulum extending from 10 o'clock to 2 o'clock. She had what appeared to be a tear of her ligamentum teres as well. I evaluated the labrum. There was an associated delamination tear at the chondral labral junction. I cleared the tissue from just superior to the tear of the labrum. I prepared the bone and then placed two suture anchors. I then passed these and tied these to stabilize the labrum. All debris was suctioned from the hip. Based on her preoperative findings, she has marked dysplasia than impingement, I did not perform a chondroplasty of the femur. Debris was again fully suctioned. The hip was taken out of traction and I then injected 0.5% Marcaine with epinephrine throughout the hip joint. I then removed the instruments and closed the skin with 2-0 nylon. Soft dressing was placed. The patient was taken to PACU in stable condition. All counts were correct x2.       Nate Arias MD [id number]  D:  03/10/2021 13:08  T:  03/10/2021 16:27  JOB #:  [id number]

## 2021-03-10 NOTE — PROGRESS NOTES
1700: Entered pt's room and found her crying hysterically due to 10/10 pain. Informed pt that I would get her her PRN pain medication. Returned to pt's room and she became upset stating that the medication prescribed by the doctor was not adequate. Dr. Amilcar Villanueva was informed and he spoke with the patient regarding her medication. 1730: Patient again complaining of inadequate pain medication. Nursing supervisor Rebekah notified. The nursing supervisor arrived at the floor and consulted with the pharmacist and myself regarding the pt's condition and the medications available to treat her. The nursing supervisor, pharmacist and myself went to the pt's room and described the medication plan we had in place to provide adequate pain control. The patient and father became upset again stating the medication was inadequate. The father stated,\" bring us the papers, we are leaving\". 1735: Dr. Amilcar Villanueva notified of pt's intent to leave AMA. 1740: Pt refused to sign AMA forms stating that the  Had not offered to provide treatment for her condition. 1800: Wound care provided, IV removed, pt recommended to follow up with MD regarding removal of stitches, pt discharged with belongings and dressings.

## 2021-03-10 NOTE — PROGRESS NOTES
Bedside shift change report given to [address] [place] [address] (oncoming nurse) by Boo Carmona (offgoing nurse). Report included the following information SBAR, Kardex, Intake/Output and MAR.

## 2021-03-10 NOTE — ANESTHESIA PREPROCEDURE EVALUATION
Relevant Problems   NEUROLOGY   (+) ADHD (attention deficit hyperactivity disorder)      CARDIOVASCULAR   (+) HTN (hypertension)       Anesthetic History   No history of anesthetic complications            Review of Systems / Medical History  Patient summary reviewed, nursing notes reviewed and pertinent labs reviewed    Pulmonary  Within defined limits                 Neuro/Psych         Psychiatric history     Cardiovascular    Hypertension                   GI/Hepatic/Renal  Within defined limits              Endo/Other  Within defined limits           Other Findings              Physical Exam    Airway  Mallampati: II  TM Distance: > 6 cm  Neck ROM: normal range of motion   Mouth opening: Normal     Cardiovascular  Regular rate and rhythm,  S1 and S2 normal,  no murmur, click, rub, or gallop             Dental  No notable dental hx       Pulmonary  Breath sounds clear to auscultation               Abdominal  GI exam deferred       Other Findings            Anesthetic Plan    ASA: 2  Anesthesia type: spinal          Induction: Intravenous  Anesthetic plan and risks discussed with: Patient

## 2021-03-10 NOTE — ANESTHESIA PROCEDURE NOTES
Spinal Block    Performed by: Ilana Early DO  Authorized by: Ilana Early DO     Pre-procedure:   Indications: primary anesthetic  Preanesthetic Checklist: patient identified, risks and benefits discussed, anesthesia consent, site marked, patient being monitored and timeout performed      Spinal Block:   Patient Position:  Seated  Prep Region:  Lumbar  Prep: DuraPrep and patient draped      Location:  L3-4  Technique:  Single shot        Needle:   Needle Type:  Pencan  Needle Gauge:  24 G  Attempts:  1      Events: CSF confirmed, no blood with aspiration and no paresthesia        Assessment:  Insertion:  Uncomplicated  Patient tolerance:  Patient tolerated the procedure well with no immediate complications

## 2021-03-10 NOTE — H&P
Date of Surgery Update:  Maria Elena Talbot was seen and examined. History and physical has been reviewed. The patient has been examined. There have been no significant clinical changes since the completion of the originally dated History and Physical.  Patient identified by surgeon; surgical site was confirmed by patient and surgeon.     Signed By: Abigail Khan MD     March 10, 2021 9:14 AM

## 2021-03-10 NOTE — PROGRESS NOTES
Occupational Therapy  03/10/21    Orders received, chart review completed. Note patient POD #0 s/p R hip arthroscopy with labral repair, noted 50% WB RLE in OT order. OT will follow up tomorrow for evaluation. Recommend OOB to chair three times a day for meals, self-completion of ADLs as able and medically stable.      Thank you,   Mikey Gudino, OTD, OTR/L

## 2021-03-11 NOTE — PROGRESS NOTES
Patient seen and examined around 5 pm. She was hysterical as she was in the per-operative holding area. She asked that I giver something else for pain. She was just given 10 mg oxycodone. I told her that I would add 5 mg valium every 6 hours an she also could have IV dilaudid. When asked about her surgical site pain, she stated that she had no pain in her hip. She also was moving her hip relatively well. She stated that her whole body hurt and that we needed to treat her entire bodies pain. She was ok with the discussion. I was called around 6pm from the floor that the patient was leaving AMA. From a surgical perspective, that is ok. She may be WBAT with crutches and can resume her extensive home narcotic/anxiolytic/sedative regimen per her pain management doctor.      We can see her in follow up in 2 weeks for suture removal.

## 2021-03-11 NOTE — ANESTHESIA POSTPROCEDURE EVALUATION
Procedure(s):  RIGHT HIP ARTHROSCOPY WITH LABRAL REPAIR.    spinal    Anesthesia Post Evaluation        Patient participation: complete - patient participated  Level of consciousness: awake  Pain management: adequate  Airway patency: patent  Anesthetic complications: no  Cardiovascular status: hemodynamically stable  Respiratory status: acceptable  Hydration status: acceptable  Comments: The patient is ready for PACU discharge. Nat Childs DO                   Post anesthesia nausea and vomiting:  controlled      INITIAL Post-op Vital signs:   Vitals Value Taken Time   /76 03/10/21 1500   Temp 36.1 °C (97 °F) 03/10/21 1301   Pulse 92 03/10/21 1509   Resp 19 03/10/21 1509   SpO2 99 % 03/10/21 1509   Vitals shown include unvalidated device data.

## 2021-06-01 ENCOUNTER — TELEPHONE (OUTPATIENT)
Dept: PALLATIVE CARE | Age: 31
End: 2021-06-01

## 2021-06-01 NOTE — TELEPHONE ENCOUNTER
[facility] Palliative Medicine Office  Nursing Note  [phone]  Fax [phone]     Name:  Zhen Moore  YOB: 1990     Incoming call from patient's mother Clive Kelly who states \"I think my daughter is ready for Palliative Care. \"  She says her daughter was diagnosed with a probably mitochondrial condition at age 13. She was followed by Dr. Bj Duarte and was also seen at [facility]. She states her daughter has been told in recent years that she also has Mg-Danlos syndrome. 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 She says her daughter graduated from high school but had to drop out of college due to her illness. Pt  and became pregnant, lost baby at 6 months into pregnancy. Pt's marriage ended and as her condition declined she moved in with her parents. Ms. Stephanie Granados states she wakes up during her night and hears her daughter in bed crying. She says it is frustrating trying to get her daughter the help that she needs. Pt sees a medical psychologist that Ms. Stephanie Granados states has been very helpful. She says patient is now mostly bedridden due to the pain and instability of her joints. She is currently being prescribed pain medication by providers at [facility]. This nurse acknowledged that pt has conditions that cause pain, however Mg-Danlos is outside of the patient population that our specialty Palliative practice sees. The guidelines for our Specialty Outpatient Palliative Medicine clinic are:  Patient is in the latter stages of a serious progressive illness and has  limited life expectancy as well as Palliative needs. The majority of out patient population is end-stage cancer patients, as well as some end-stage respiratory conditions and end stage heart failure.     Ms. Stephanie Granados states she has read that Palliative is appropriate for patients at any stage of a progressive illness. While this is true of Palliative Care in general, due to our resources, our specialty Palliative clinic focuses on patients in the latter states of a life-limiting illness rather than chronic illness. Ms. Treva Samuels states she is very disappointed that we can't accept her daughter. This nurse offered to transfer her to our Practice Administrator for further discussion about our clinic guidelines, but she declined.         Dennise Duron, BSN, RN  Palliative Medicine  [phone]

## 2021-06-01 NOTE — TELEPHONE ENCOUNTER
The patient's mother, Claudio Marquez is calling to get information on Home Based Palliative Care.   # [id number]

## 2021-06-18 ENCOUNTER — HOSPITAL ENCOUNTER (EMERGENCY)
Age: 31
Discharge: HOME | End: 2021-06-18
Attending: EMERGENCY MEDICINE

## 2021-06-18 ENCOUNTER — APPOINTMENT (OUTPATIENT)
Dept: GENERAL RADIOLOGY | Age: 31
End: 2021-06-18
Attending: PHYSICIAN ASSISTANT

## 2021-06-18 VITALS
DIASTOLIC BLOOD PRESSURE: 68 MMHG | TEMPERATURE: 98 F | HEART RATE: 98 BPM | OXYGEN SATURATION: 97 % | SYSTOLIC BLOOD PRESSURE: 145 MMHG | RESPIRATION RATE: 16 BRPM

## 2021-06-18 DIAGNOSIS — M25.551 CHRONIC PAIN OF BOTH HIPS: Primary | ICD-10-CM

## 2021-06-18 DIAGNOSIS — M25.552 CHRONIC PAIN OF BOTH HIPS: Primary | ICD-10-CM

## 2021-06-18 DIAGNOSIS — G89.29 CHRONIC PAIN OF BOTH HIPS: Primary | ICD-10-CM

## 2021-06-18 PROCEDURE — 73523 X-RAY EXAM HIPS BI 5/> VIEWS: CPT

## 2021-06-18 PROCEDURE — 96372 THER/PROPH/DIAG INJ SC/IM: CPT | Performed by: PHYSICIAN ASSISTANT

## 2021-06-18 PROCEDURE — 10002803 HB RX 637: Performed by: PHYSICIAN ASSISTANT

## 2021-06-18 PROCEDURE — 10002800 HB RX 250 W HCPCS: Performed by: PHYSICIAN ASSISTANT

## 2021-06-18 PROCEDURE — 73523 X-RAY EXAM HIPS BI 5/> VIEWS: CPT | Performed by: RADIOLOGY

## 2021-06-18 PROCEDURE — 99283 EMERGENCY DEPT VISIT LOW MDM: CPT

## 2021-06-18 RX ORDER — ONDANSETRON 4 MG/1
4 TABLET, ORALLY DISINTEGRATING ORAL ONCE
Status: COMPLETED | OUTPATIENT
Start: 2021-06-18 | End: 2021-06-18

## 2021-06-18 RX ORDER — HYDROCODONE BITARTRATE AND ACETAMINOPHEN 5; 325 MG/1; MG/1
1 TABLET ORAL ONCE
Status: COMPLETED | OUTPATIENT
Start: 2021-06-18 | End: 2021-06-18

## 2021-06-18 RX ADMIN — HYDROCODONE BITARTRATE AND ACETAMINOPHEN 1 TABLET: 5; 325 TABLET ORAL at 15:00

## 2021-06-18 RX ADMIN — MORPHINE SULFATE 4 MG: 4 INJECTION, SOLUTION INTRAMUSCULAR; INTRAVENOUS at 16:08

## 2021-06-18 RX ADMIN — ONDANSETRON 4 MG: 4 TABLET, ORALLY DISINTEGRATING ORAL at 16:12

## 2021-06-18 RX ADMIN — ONDANSETRON 4 MG: 4 TABLET, ORALLY DISINTEGRATING ORAL at 17:08

## 2021-06-18 RX ADMIN — MORPHINE SULFATE 4 MG: 4 INJECTION, SOLUTION INTRAMUSCULAR; INTRAVENOUS at 15:25

## 2021-06-18 RX ADMIN — HYDROMORPHONE HYDROCHLORIDE 1 MG: 1 INJECTION, SOLUTION INTRAMUSCULAR; INTRAVENOUS; SUBCUTANEOUS at 17:01

## 2021-06-18 ASSESSMENT — ENCOUNTER SYMPTOMS
VOMITING: 0
SHORTNESS OF BREATH: 0
NAUSEA: 0
BACK PAIN: 0
COLOR CHANGE: 0
COUGH: 0
FATIGUE: 0
CONFUSION: 0
APPETITE CHANGE: 0
DIARRHEA: 0
ABDOMINAL PAIN: 0
FEVER: 0
WEAKNESS: 0
HEADACHES: 0
CHILLS: 0
NUMBNESS: 0
SORE THROAT: 0
PHOTOPHOBIA: 0

## 2021-06-18 ASSESSMENT — PAIN SCALES - GENERAL
PAINLEVEL_OUTOF10: 7
PAINLEVEL_OUTOF10: 7
PAINLEVEL_OUTOF10: 8
PAINLEVEL_OUTOF10: 8
PAINLEVEL_OUTOF10: 7

## 2021-06-18 ASSESSMENT — PAIN DESCRIPTION - PAIN TYPE
TYPE: CHRONIC PAIN

## 2021-06-19 ENCOUNTER — HOSPITAL ENCOUNTER (OUTPATIENT)
Age: 31
Setting detail: OBSERVATION
Discharge: HOME | End: 2021-06-22
Attending: EMERGENCY MEDICINE | Admitting: FAMILY MEDICINE

## 2021-06-19 ENCOUNTER — HOSPITAL ENCOUNTER (EMERGENCY)
Age: 31
Discharge: HOME | End: 2021-06-19
Attending: EMERGENCY MEDICINE

## 2021-06-19 VITALS
DIASTOLIC BLOOD PRESSURE: 93 MMHG | RESPIRATION RATE: 16 BRPM | HEART RATE: 87 BPM | TEMPERATURE: 98.3 F | OXYGEN SATURATION: 96 % | SYSTOLIC BLOOD PRESSURE: 144 MMHG

## 2021-06-19 DIAGNOSIS — M25.552 HIP PAIN, BILATERAL: Primary | ICD-10-CM

## 2021-06-19 DIAGNOSIS — M25.551 HIP PAIN, BILATERAL: Primary | ICD-10-CM

## 2021-06-19 DIAGNOSIS — M25.552 CHRONIC HIP PAIN, BILATERAL: ICD-10-CM

## 2021-06-19 DIAGNOSIS — M25.551 CHRONIC HIP PAIN, BILATERAL: ICD-10-CM

## 2021-06-19 DIAGNOSIS — G89.29 CHRONIC HIP PAIN, BILATERAL: ICD-10-CM

## 2021-06-19 DIAGNOSIS — G89.29 CHRONIC INTRACTABLE PAIN: Primary | ICD-10-CM

## 2021-06-19 LAB
ANION GAP BLD CALC-SCNC: 19 MMOL/L (ref 10–20)
ANION GAP SERPL CALC-SCNC: 10 MMOL/L (ref 10–20)
B-HCG SERPL-ACNC: <5 IU/L
BASOPHILS # BLD: 0 K/MCL (ref 0–0.3)
BASOPHILS NFR BLD: 0 %
BUN BLD-MCNC: 4 MG/DL (ref 6–20)
BUN SERPL-MCNC: 6 MG/DL (ref 6–20)
BUN/CREAT SERPL: 8 (ref 7–25)
CA-I BLD-SCNC: 1.25 MMOL/L (ref 1.15–1.29)
CALCIUM SERPL-MCNC: 9.5 MG/DL (ref 8.4–10.2)
CHLORIDE BLD-SCNC: 98 MMOL/L (ref 98–107)
CHLORIDE SERPL-SCNC: 102 MMOL/L (ref 98–107)
CO2 BLD-SCNC: 27 MMOL/L (ref 19–24)
CO2 SERPL-SCNC: 33 MMOL/L (ref 21–32)
CREAT SERPL-MCNC: 0.76 MG/DL (ref 0.51–0.95)
CREAT SERPL-MCNC: 0.8 MG/DL (ref 0.51–0.95)
DEPRECATED RDW RBC: 43.5 FL (ref 39–50)
EOSINOPHIL # BLD: 0.2 K/MCL (ref 0–0.5)
EOSINOPHIL NFR BLD: 3 %
ERYTHROCYTE [DISTWIDTH] IN BLOOD: 13.2 % (ref 11–15)
FASTING DURATION TIME PATIENT: ABNORMAL H
GFR SERPLBLD BASED ON 1.73 SQ M-ARVRAT: >90 ML/MIN/1.73M2
GFR SERPLBLD BASED ON 1.73 SQ M-ARVRAT: >90 ML/MIN/1.73M2
GLUCOSE BLD-MCNC: 104 MG/DL (ref 70–99)
GLUCOSE SERPL-MCNC: 104 MG/DL (ref 65–99)
HCT VFR BLD CALC: 35 % (ref 36–46.5)
HCT VFR BLD CALC: 35.2 % (ref 36–46.5)
HGB BLD CALC-MCNC: 11.9 G/DL (ref 12–15.5)
HGB BLD-MCNC: 11.8 G/DL (ref 12–15.5)
IMM GRANULOCYTES # BLD AUTO: 0 K/MCL (ref 0–0.2)
IMM GRANULOCYTES # BLD: 0 %
LYMPHOCYTES # BLD: 2.8 K/MCL (ref 1–4.8)
LYMPHOCYTES NFR BLD: 41 %
MCH RBC QN AUTO: 30.3 PG (ref 26–34)
MCHC RBC AUTO-ENTMCNC: 33.5 G/DL (ref 32–36.5)
MCV RBC AUTO: 90.5 FL (ref 78–100)
MONOCYTES # BLD: 0.7 K/MCL (ref 0.3–0.9)
MONOCYTES NFR BLD: 10 %
NEUTROPHILS # BLD: 3.2 K/MCL (ref 1.8–7.7)
NEUTROPHILS NFR BLD: 46 %
NRBC BLD MANUAL-RTO: 0 /100 WBC
PLATELET # BLD AUTO: 309 K/MCL (ref 140–450)
POTASSIUM BLD-SCNC: 4 MMOL/L (ref 3.4–5.1)
POTASSIUM SERPL-SCNC: 4 MMOL/L (ref 3.4–5.1)
RAINBOW EXTRA TUBES HOLD SPECIMEN: NORMAL
RBC # BLD: 3.89 MIL/MCL (ref 4–5.2)
SODIUM BLD-SCNC: 140 MMOL/L (ref 135–145)
SODIUM SERPL-SCNC: 141 MMOL/L (ref 135–145)
WBC # BLD: 6.9 K/MCL (ref 4.2–11)

## 2021-06-19 PROCEDURE — 96376 TX/PRO/DX INJ SAME DRUG ADON: CPT

## 2021-06-19 PROCEDURE — 96361 HYDRATE IV INFUSION ADD-ON: CPT

## 2021-06-19 PROCEDURE — 84702 CHORIONIC GONADOTROPIN TEST: CPT

## 2021-06-19 PROCEDURE — 85025 COMPLETE CBC W/AUTO DIFF WBC: CPT | Performed by: PHYSICIAN ASSISTANT

## 2021-06-19 PROCEDURE — 96374 THER/PROPH/DIAG INJ IV PUSH: CPT

## 2021-06-19 PROCEDURE — 10002800 HB RX 250 W HCPCS: Performed by: PHYSICIAN ASSISTANT

## 2021-06-19 PROCEDURE — 10002800 HB RX 250 W HCPCS: Performed by: EMERGENCY MEDICINE

## 2021-06-19 PROCEDURE — 96375 TX/PRO/DX INJ NEW DRUG ADDON: CPT

## 2021-06-19 PROCEDURE — 36415 COLL VENOUS BLD VENIPUNCTURE: CPT

## 2021-06-19 PROCEDURE — 99219 INITIAL OBSERVATION CARE,LEVL II: CPT | Performed by: FAMILY MEDICINE

## 2021-06-19 PROCEDURE — 10002807 HB RX 258: Performed by: EMERGENCY MEDICINE

## 2021-06-19 PROCEDURE — 80048 BASIC METABOLIC PNL TOTAL CA: CPT | Performed by: PHYSICIAN ASSISTANT

## 2021-06-19 PROCEDURE — G0378 HOSPITAL OBSERVATION PER HR: HCPCS

## 2021-06-19 PROCEDURE — 10002807 HB RX 258: Performed by: PHYSICIAN ASSISTANT

## 2021-06-19 PROCEDURE — 80047 BASIC METABLC PNL IONIZED CA: CPT

## 2021-06-19 PROCEDURE — 99284 EMERGENCY DEPT VISIT MOD MDM: CPT

## 2021-06-19 PROCEDURE — 99283 EMERGENCY DEPT VISIT LOW MDM: CPT

## 2021-06-19 RX ORDER — HYDROXYZINE PAMOATE 25 MG/1
25 CAPSULE ORAL 3 TIMES DAILY PRN
COMMUNITY

## 2021-06-19 RX ORDER — AMITRIPTYLINE HYDROCHLORIDE 25 MG/1
75 TABLET, FILM COATED ORAL NIGHTLY
COMMUNITY

## 2021-06-19 RX ORDER — FOLIC ACID 1 MG/1
2 TABLET ORAL DAILY
COMMUNITY

## 2021-06-19 RX ORDER — 0.9 % SODIUM CHLORIDE 0.9 %
2 VIAL (ML) INJECTION EVERY 12 HOURS SCHEDULED
Status: DISCONTINUED | OUTPATIENT
Start: 2021-06-20 | End: 2021-06-22 | Stop reason: HOSPADM

## 2021-06-19 RX ORDER — ONDANSETRON 2 MG/ML
4 INJECTION INTRAMUSCULAR; INTRAVENOUS ONCE
Status: COMPLETED | OUTPATIENT
Start: 2021-06-19 | End: 2021-06-19

## 2021-06-19 RX ORDER — CHOLECALCIFEROL (VITAMIN D3) 1250 MCG
1.25 CAPSULE ORAL
Status: ON HOLD | COMMUNITY
End: 2021-06-20

## 2021-06-19 RX ORDER — TIZANIDINE 2 MG/1
2 TABLET ORAL EVERY 6 HOURS PRN
Qty: 20 TABLET | Refills: 0 | Status: SHIPPED | OUTPATIENT
Start: 2021-06-19

## 2021-06-19 RX ORDER — ALPRAZOLAM 1 MG/1
1 TABLET ORAL 2 TIMES DAILY PRN
Status: ON HOLD | COMMUNITY
End: 2021-06-22 | Stop reason: SDUPTHER

## 2021-06-19 RX ORDER — PROPRANOLOL HYDROCHLORIDE 60 MG/1
60 TABLET ORAL DAILY
COMMUNITY

## 2021-06-19 RX ORDER — MAGNESIUM HYDROXIDE/ALUMINUM HYDROXICE/SIMETHICONE 120; 1200; 1200 MG/30ML; MG/30ML; MG/30ML
30 SUSPENSION ORAL EVERY 4 HOURS PRN
Status: DISCONTINUED | OUTPATIENT
Start: 2021-06-19 | End: 2021-06-22 | Stop reason: HOSPADM

## 2021-06-19 RX ORDER — METHOTREXATE 25 MG/ML
25 INJECTION INTRA-ARTERIAL; INTRAMUSCULAR; INTRATHECAL; INTRAVENOUS
COMMUNITY

## 2021-06-19 RX ORDER — ENOXAPARIN SODIUM 100 MG/ML
40 INJECTION SUBCUTANEOUS DAILY
Status: DISCONTINUED | OUTPATIENT
Start: 2021-06-20 | End: 2021-06-22 | Stop reason: HOSPADM

## 2021-06-19 RX ORDER — ACETAMINOPHEN 325 MG/1
650 TABLET ORAL EVERY 4 HOURS PRN
Status: DISCONTINUED | OUTPATIENT
Start: 2021-06-19 | End: 2021-06-21

## 2021-06-19 RX ORDER — PREDNISONE 10 MG/1
10 TABLET ORAL DAILY
COMMUNITY

## 2021-06-19 RX ORDER — SODIUM CHLORIDE 9 MG/ML
INJECTION, SOLUTION INTRAVENOUS
Status: DISCONTINUED
Start: 2021-06-19 | End: 2021-06-19 | Stop reason: HOSPADM

## 2021-06-19 RX ORDER — HALOPERIDOL 5 MG/ML
2 INJECTION INTRAMUSCULAR ONCE
Status: COMPLETED | OUTPATIENT
Start: 2021-06-19 | End: 2021-06-19

## 2021-06-19 RX ORDER — HYDROCODONE BITARTRATE AND ACETAMINOPHEN 5; 325 MG/1; MG/1
1 TABLET ORAL EVERY 4 HOURS PRN
Status: DISCONTINUED | OUTPATIENT
Start: 2021-06-19 | End: 2021-06-20 | Stop reason: ALTCHOICE

## 2021-06-19 RX ORDER — GABAPENTIN 600 MG/1
600 TABLET ORAL 3 TIMES DAILY
COMMUNITY

## 2021-06-19 RX ORDER — AMOXICILLIN 250 MG
2 CAPSULE ORAL DAILY PRN
Status: DISCONTINUED | OUTPATIENT
Start: 2021-06-19 | End: 2021-06-21

## 2021-06-19 RX ORDER — PROMETHAZINE HYDROCHLORIDE 25 MG/1
25 TABLET ORAL DAILY PRN
COMMUNITY

## 2021-06-19 RX ORDER — POLYETHYLENE GLYCOL 3350 17 G/17G
17 POWDER, FOR SOLUTION ORAL DAILY PRN
Status: DISCONTINUED | OUTPATIENT
Start: 2021-06-19 | End: 2021-06-21

## 2021-06-19 RX ORDER — OXYCODONE HYDROCHLORIDE 10 MG/1
10 TABLET ORAL EVERY 6 HOURS PRN
Status: ON HOLD | COMMUNITY
End: 2021-06-22 | Stop reason: HOSPADM

## 2021-06-19 RX ORDER — DEXTROAMPHETAMINE SACCHARATE, AMPHETAMINE ASPARTATE, DEXTROAMPHETAMINE SULFATE AND AMPHETAMINE SULFATE 7.5; 7.5; 7.5; 7.5 MG/1; MG/1; MG/1; MG/1
30 TABLET ORAL DAILY
COMMUNITY

## 2021-06-19 RX ORDER — BISACODYL 10 MG
10 SUPPOSITORY, RECTAL RECTAL DAILY PRN
Status: DISCONTINUED | OUTPATIENT
Start: 2021-06-19 | End: 2021-06-22 | Stop reason: HOSPADM

## 2021-06-19 RX ORDER — NALOXONE HYDROCHLORIDE 4 MG/.1ML
4 SPRAY NASAL
COMMUNITY

## 2021-06-19 RX ORDER — LEUCOVORIN CALCIUM 5 MG/1
5 TABLET ORAL
COMMUNITY

## 2021-06-19 RX ORDER — ONDANSETRON 2 MG/ML
INJECTION INTRAMUSCULAR; INTRAVENOUS
Status: DISCONTINUED
Start: 2021-06-19 | End: 2021-06-19 | Stop reason: HOSPADM

## 2021-06-19 RX ORDER — ONDANSETRON 2 MG/ML
4 INJECTION INTRAMUSCULAR; INTRAVENOUS 2 TIMES DAILY PRN
Status: DISCONTINUED | OUTPATIENT
Start: 2021-06-19 | End: 2021-06-22 | Stop reason: HOSPADM

## 2021-06-19 RX ADMIN — HALOPERIDOL LACTATE 2 MG: 5 INJECTION, SOLUTION INTRAMUSCULAR at 21:45

## 2021-06-19 RX ADMIN — HYDROMORPHONE HYDROCHLORIDE 0.5 MG: 1 INJECTION, SOLUTION INTRAMUSCULAR; INTRAVENOUS; SUBCUTANEOUS at 05:27

## 2021-06-19 RX ADMIN — HYDROMORPHONE HYDROCHLORIDE 1 MG: 1 INJECTION, SOLUTION INTRAMUSCULAR; INTRAVENOUS; SUBCUTANEOUS at 22:09

## 2021-06-19 RX ADMIN — KETOROLAC TROMETHAMINE 15 MG: 15 INJECTION, SOLUTION INTRAMUSCULAR; INTRAVENOUS at 21:47

## 2021-06-19 RX ADMIN — HALOPERIDOL LACTATE 2 MG: 5 INJECTION INTRAMUSCULAR at 03:37

## 2021-06-19 RX ADMIN — SODIUM CHLORIDE, POTASSIUM CHLORIDE, SODIUM LACTATE AND CALCIUM CHLORIDE 1000 ML: 600; 310; 30; 20 INJECTION, SOLUTION INTRAVENOUS at 03:54

## 2021-06-19 RX ADMIN — HYDROMORPHONE HYDROCHLORIDE 1 MG: 1 INJECTION, SOLUTION INTRAMUSCULAR; INTRAVENOUS; SUBCUTANEOUS at 04:40

## 2021-06-19 RX ADMIN — ONDANSETRON 4 MG: 2 INJECTION INTRAMUSCULAR; INTRAVENOUS at 03:12

## 2021-06-19 RX ADMIN — HYDROMORPHONE HYDROCHLORIDE 1 MG: 1 INJECTION, SOLUTION INTRAMUSCULAR; INTRAVENOUS; SUBCUTANEOUS at 03:07

## 2021-06-19 RX ADMIN — SODIUM CHLORIDE 1000 ML: 9 INJECTION, SOLUTION INTRAVENOUS at 21:44

## 2021-06-19 ASSESSMENT — ENCOUNTER SYMPTOMS
ABDOMINAL PAIN: 0
HEADACHES: 0
CHILLS: 0
CHILLS: 0
SHORTNESS OF BREATH: 0
SHORTNESS OF BREATH: 0
FEVER: 0
VOMITING: 0
DIZZINESS: 0
ABDOMINAL DISTENTION: 0
COUGH: 0
WEAKNESS: 0
BACK PAIN: 0
NERVOUS/ANXIOUS: 0
CHEST TIGHTNESS: 0
WOUND: 0
NUMBNESS: 0
COLOR CHANGE: 0
NAUSEA: 0
NAUSEA: 0
FEVER: 0
FATIGUE: 0
VOMITING: 0
WEAKNESS: 0
ABDOMINAL PAIN: 0
DIARRHEA: 0

## 2021-06-19 ASSESSMENT — PATIENT HEALTH QUESTIONNAIRE - PHQ9
CLINICAL INTERPRETATION OF PHQ2 SCORE: NO FURTHER SCREENING NEEDED
IS PATIENT ABLE TO COMPLETE PHQ2 OR PHQ9: YES

## 2021-06-19 ASSESSMENT — PAIN SCALES - GENERAL
PAINLEVEL_OUTOF10: 8
PAINLEVEL_OUTOF10: 9
PAINLEVEL_OUTOF10: 10
PAINLEVEL_OUTOF10: 10
PAINLEVEL_OUTOF10: 9
PAINLEVEL_OUTOF10: 8
PAINLEVEL_OUTOF10: 10
PAINLEVEL_OUTOF10: 7
PAINLEVEL_OUTOF10: 8

## 2021-06-19 ASSESSMENT — PAIN DESCRIPTION - PAIN TYPE
TYPE: CHRONIC PAIN
TYPE: CHRONIC PAIN

## 2021-06-20 LAB
AMPHETAMINES UR QL SCN>500 NG/ML: POSITIVE
ANION GAP SERPL CALC-SCNC: 9 MMOL/L (ref 10–20)
BARBITURATES UR QL SCN>200 NG/ML: NEGATIVE
BENZODIAZ UR QL SCN>200 NG/ML: POSITIVE
BUN SERPL-MCNC: 6 MG/DL (ref 6–20)
BUN/CREAT SERPL: 9 (ref 7–25)
BZE UR QL SCN>150 NG/ML: NEGATIVE
CALCIUM SERPL-MCNC: 9.2 MG/DL (ref 8.4–10.2)
CANNABINOIDS UR QL SCN>50 NG/ML: POSITIVE
CHLORIDE SERPL-SCNC: 102 MMOL/L (ref 98–107)
CO2 SERPL-SCNC: 30 MMOL/L (ref 21–32)
CREAT SERPL-MCNC: 0.66 MG/DL (ref 0.51–0.95)
DEPRECATED RDW RBC: 43.3 FL (ref 39–50)
ERYTHROCYTE [DISTWIDTH] IN BLOOD: 13.1 % (ref 11–15)
FASTING DURATION TIME PATIENT: ABNORMAL H
GFR SERPLBLD BASED ON 1.73 SQ M-ARVRAT: >90 ML/MIN/1.73M2
GLUCOSE SERPL-MCNC: 103 MG/DL (ref 65–99)
HCT VFR BLD CALC: 32.8 % (ref 36–46.5)
HGB BLD-MCNC: 10.9 G/DL (ref 12–15.5)
MAGNESIUM SERPL-MCNC: 1.9 MG/DL (ref 1.7–2.4)
MCH RBC QN AUTO: 30.3 PG (ref 26–34)
MCHC RBC AUTO-ENTMCNC: 33.2 G/DL (ref 32–36.5)
MCV RBC AUTO: 91.1 FL (ref 78–100)
NRBC BLD MANUAL-RTO: 0 /100 WBC
OPIATES UR QL SCN>300 NG/ML: POSITIVE
PCP UR QL SCN>25 NG/ML: NEGATIVE
PLATELET # BLD AUTO: 279 K/MCL (ref 140–450)
POTASSIUM SERPL-SCNC: 3.4 MMOL/L (ref 3.4–5.1)
POTASSIUM SERPL-SCNC: 4.9 MMOL/L (ref 3.4–5.1)
RAINBOW EXTRA TUBES HOLD SPECIMEN: NORMAL
RBC # BLD: 3.6 MIL/MCL (ref 4–5.2)
SODIUM SERPL-SCNC: 138 MMOL/L (ref 135–145)
WBC # BLD: 5.7 K/MCL (ref 4.2–11)

## 2021-06-20 PROCEDURE — 96376 TX/PRO/DX INJ SAME DRUG ADON: CPT

## 2021-06-20 PROCEDURE — 10004172 HB COUNTER-THERAPY VISIT OT

## 2021-06-20 PROCEDURE — 85027 COMPLETE CBC AUTOMATED: CPT | Performed by: FAMILY MEDICINE

## 2021-06-20 PROCEDURE — 10002800 HB RX 250 W HCPCS: Performed by: HOSPITALIST

## 2021-06-20 PROCEDURE — 36415 COLL VENOUS BLD VENIPUNCTURE: CPT | Performed by: FAMILY MEDICINE

## 2021-06-20 PROCEDURE — 80048 BASIC METABOLIC PNL TOTAL CA: CPT | Performed by: FAMILY MEDICINE

## 2021-06-20 PROCEDURE — 10004173 HB COUNTER-THERAPY VISIT PT: Performed by: PHYSICAL THERAPIST

## 2021-06-20 PROCEDURE — 97535 SELF CARE MNGMENT TRAINING: CPT

## 2021-06-20 PROCEDURE — 10002803 HB RX 637: Performed by: FAMILY MEDICINE

## 2021-06-20 PROCEDURE — 96375 TX/PRO/DX INJ NEW DRUG ADDON: CPT

## 2021-06-20 PROCEDURE — 97165 OT EVAL LOW COMPLEX 30 MIN: CPT

## 2021-06-20 PROCEDURE — G0378 HOSPITAL OBSERVATION PER HR: HCPCS

## 2021-06-20 PROCEDURE — 80307 DRUG TEST PRSMV CHEM ANLYZR: CPT | Performed by: PHYSICIAN ASSISTANT

## 2021-06-20 PROCEDURE — 83735 ASSAY OF MAGNESIUM: CPT | Performed by: FAMILY MEDICINE

## 2021-06-20 PROCEDURE — 10002803 HB RX 637: Performed by: HOSPITALIST

## 2021-06-20 PROCEDURE — 97162 PT EVAL MOD COMPLEX 30 MIN: CPT | Performed by: PHYSICAL THERAPIST

## 2021-06-20 PROCEDURE — 84132 ASSAY OF SERUM POTASSIUM: CPT | Performed by: FAMILY MEDICINE

## 2021-06-20 PROCEDURE — 10004651 HB RX, NO CHARGE ITEM: Performed by: FAMILY MEDICINE

## 2021-06-20 PROCEDURE — 99225 SUBSEQUENT OBSERVATION CARE LEVEL II: CPT | Performed by: HOSPITALIST

## 2021-06-20 PROCEDURE — 10002800 HB RX 250 W HCPCS: Performed by: FAMILY MEDICINE

## 2021-06-20 RX ORDER — LIDOCAINE 4 G/G
1 PATCH TOPICAL DAILY
Status: DISCONTINUED | OUTPATIENT
Start: 2021-06-20 | End: 2021-06-22 | Stop reason: HOSPADM

## 2021-06-20 RX ORDER — ALPRAZOLAM 1 MG/1
1 TABLET ORAL 2 TIMES DAILY PRN
Status: DISCONTINUED | OUTPATIENT
Start: 2021-06-20 | End: 2021-06-22 | Stop reason: HOSPADM

## 2021-06-20 RX ORDER — PROCHLORPERAZINE EDISYLATE 5 MG/ML
5 INJECTION INTRAMUSCULAR; INTRAVENOUS EVERY 6 HOURS PRN
Status: DISCONTINUED | OUTPATIENT
Start: 2021-06-20 | End: 2021-06-22 | Stop reason: HOSPADM

## 2021-06-20 RX ORDER — HYDROXYZINE HYDROCHLORIDE 25 MG/1
25 TABLET, FILM COATED ORAL EVERY 6 HOURS PRN
Status: DISCONTINUED | OUTPATIENT
Start: 2021-06-20 | End: 2021-06-22 | Stop reason: HOSPADM

## 2021-06-20 RX ORDER — TIZANIDINE 2 MG/1
2 TABLET ORAL EVERY 6 HOURS PRN
Status: DISCONTINUED | OUTPATIENT
Start: 2021-06-20 | End: 2021-06-22 | Stop reason: HOSPADM

## 2021-06-20 RX ORDER — DEXTROAMPHETAMINE SACCHARATE, AMPHETAMINE ASPARTATE, DEXTROAMPHETAMINE SULFATE AND AMPHETAMINE SULFATE 2.5; 2.5; 2.5; 2.5 MG/1; MG/1; MG/1; MG/1
30 TABLET ORAL DAILY
Status: DISCONTINUED | OUTPATIENT
Start: 2021-06-20 | End: 2021-06-22 | Stop reason: HOSPADM

## 2021-06-20 RX ORDER — ALPRAZOLAM 1 MG/1
1 TABLET, EXTENDED RELEASE ORAL DAILY
Status: ON HOLD | COMMUNITY
End: 2021-06-22 | Stop reason: SDUPTHER

## 2021-06-20 RX ORDER — PREDNISONE 10 MG/1
10 TABLET ORAL DAILY
Status: DISCONTINUED | OUTPATIENT
Start: 2021-06-20 | End: 2021-06-22 | Stop reason: HOSPADM

## 2021-06-20 RX ORDER — FOLIC ACID 1 MG/1
2 TABLET ORAL DAILY
Status: DISCONTINUED | OUTPATIENT
Start: 2021-06-20 | End: 2021-06-22 | Stop reason: HOSPADM

## 2021-06-20 RX ORDER — AMITRIPTYLINE HYDROCHLORIDE 25 MG/1
75 TABLET, FILM COATED ORAL NIGHTLY
Status: DISCONTINUED | OUTPATIENT
Start: 2021-06-20 | End: 2021-06-22 | Stop reason: HOSPADM

## 2021-06-20 RX ORDER — POTASSIUM CHLORIDE 20 MEQ/1
40 TABLET, EXTENDED RELEASE ORAL ONCE
Status: COMPLETED | OUTPATIENT
Start: 2021-06-20 | End: 2021-06-20

## 2021-06-20 RX ORDER — LEUCOVORIN CALCIUM 5 MG/1
5 TABLET ORAL DAILY
Status: DISCONTINUED | OUTPATIENT
Start: 2021-06-20 | End: 2021-06-21 | Stop reason: CLARIF

## 2021-06-20 RX ORDER — OXYCODONE HYDROCHLORIDE 5 MG/1
10 TABLET ORAL EVERY 6 HOURS PRN
Status: DISCONTINUED | OUTPATIENT
Start: 2021-06-20 | End: 2021-06-20

## 2021-06-20 RX ORDER — ALPRAZOLAM 0.25 MG/1
0.5 TABLET ORAL 2 TIMES DAILY
Status: DISCONTINUED | OUTPATIENT
Start: 2021-06-20 | End: 2021-06-22 | Stop reason: HOSPADM

## 2021-06-20 RX ORDER — GABAPENTIN 300 MG/1
600 CAPSULE ORAL 3 TIMES DAILY
Status: DISCONTINUED | OUTPATIENT
Start: 2021-06-20 | End: 2021-06-22 | Stop reason: HOSPADM

## 2021-06-20 RX ORDER — ERGOCALCIFEROL 1.25 MG/1
1.25 CAPSULE ORAL
COMMUNITY

## 2021-06-20 RX ORDER — OXYCODONE HYDROCHLORIDE 5 MG/1
10 TABLET ORAL EVERY 4 HOURS PRN
Status: DISCONTINUED | OUTPATIENT
Start: 2021-06-20 | End: 2021-06-21

## 2021-06-20 RX ADMIN — AMITRIPTYLINE HYDROCHLORIDE 75 MG: 25 TABLET, FILM COATED ORAL at 20:33

## 2021-06-20 RX ADMIN — OXYCODONE HYDROCHLORIDE 10 MG: 5 TABLET ORAL at 17:51

## 2021-06-20 RX ADMIN — ALPRAZOLAM 1 MG: 1 TABLET ORAL at 13:30

## 2021-06-20 RX ADMIN — OXYCODONE HYDROCHLORIDE 10 MG: 5 TABLET ORAL at 22:53

## 2021-06-20 RX ADMIN — HYDROMORPHONE HYDROCHLORIDE 1 MG: 1 INJECTION, SOLUTION INTRAMUSCULAR; INTRAVENOUS; SUBCUTANEOUS at 02:25

## 2021-06-20 RX ADMIN — ONDANSETRON 4 MG: 2 INJECTION INTRAMUSCULAR; INTRAVENOUS at 08:14

## 2021-06-20 RX ADMIN — AMITRIPTYLINE HYDROCHLORIDE 75 MG: 25 TABLET, FILM COATED ORAL at 03:25

## 2021-06-20 RX ADMIN — TIZANIDINE 2 MG: 2 TABLET ORAL at 03:25

## 2021-06-20 RX ADMIN — LIDOCAINE 1 PATCH: 560 PATCH PERCUTANEOUS; TOPICAL; TRANSDERMAL at 09:38

## 2021-06-20 RX ADMIN — OXYCODONE HYDROCHLORIDE 10 MG: 5 TABLET ORAL at 13:23

## 2021-06-20 RX ADMIN — SODIUM CHLORIDE, PRESERVATIVE FREE 2 ML: 5 INJECTION INTRAVENOUS at 08:07

## 2021-06-20 RX ADMIN — HYDROMORPHONE HYDROCHLORIDE 1 MG: 1 INJECTION, SOLUTION INTRAMUSCULAR; INTRAVENOUS; SUBCUTANEOUS at 11:28

## 2021-06-20 RX ADMIN — HYDROXYZINE HYDROCHLORIDE 25 MG: 25 TABLET ORAL at 15:55

## 2021-06-20 RX ADMIN — KETOROLAC TROMETHAMINE 30 MG: 30 INJECTION, SOLUTION INTRAMUSCULAR; INTRAVENOUS at 17:50

## 2021-06-20 RX ADMIN — GABAPENTIN 600 MG: 300 CAPSULE ORAL at 08:07

## 2021-06-20 RX ADMIN — DEXTROAMPHETAMINE SACCHARATE, AMPHETAMINE ASPARTATE, DEXTROAMPHETAMINE SULFATE AND AMPHETAMINE SULFATE 30 MG: 2.5; 2.5; 2.5; 2.5 TABLET ORAL at 08:07

## 2021-06-20 RX ADMIN — HYDROXYZINE HYDROCHLORIDE 25 MG: 25 TABLET ORAL at 03:25

## 2021-06-20 RX ADMIN — LEUCOVORIN CALCIUM 5 MG: 5 TABLET ORAL at 08:21

## 2021-06-20 RX ADMIN — PROCHLORPERAZINE EDISYLATE 5 MG: 5 INJECTION INTRAMUSCULAR; INTRAVENOUS at 16:50

## 2021-06-20 RX ADMIN — OXYCODONE HYDROCHLORIDE 10 MG: 5 TABLET ORAL at 09:38

## 2021-06-20 RX ADMIN — KETOROLAC TROMETHAMINE 30 MG: 30 INJECTION, SOLUTION INTRAMUSCULAR; INTRAVENOUS at 11:17

## 2021-06-20 RX ADMIN — POLYETHYLENE GLYCOL 3350 17 G: 17 POWDER, FOR SOLUTION ORAL at 17:51

## 2021-06-20 RX ADMIN — KETOROLAC TROMETHAMINE 30 MG: 30 INJECTION, SOLUTION INTRAMUSCULAR; INTRAVENOUS at 05:00

## 2021-06-20 RX ADMIN — HYDROCODONE BITARTRATE AND ACETAMINOPHEN 1 TABLET: 5; 325 TABLET ORAL at 00:21

## 2021-06-20 RX ADMIN — GABAPENTIN 600 MG: 300 CAPSULE ORAL at 13:23

## 2021-06-20 RX ADMIN — FOLIC ACID 2 MG: 1 TABLET ORAL at 08:08

## 2021-06-20 RX ADMIN — TIZANIDINE 2 MG: 2 TABLET ORAL at 09:44

## 2021-06-20 RX ADMIN — ALPRAZOLAM 1 MG: 1 TABLET ORAL at 03:25

## 2021-06-20 RX ADMIN — GABAPENTIN 600 MG: 300 CAPSULE ORAL at 20:33

## 2021-06-20 RX ADMIN — HYDROMORPHONE HYDROCHLORIDE 1 MG: 1 INJECTION, SOLUTION INTRAMUSCULAR; INTRAVENOUS; SUBCUTANEOUS at 08:15

## 2021-06-20 RX ADMIN — PROPRANOLOL HYDROCHLORIDE 60 MG: 20 TABLET ORAL at 08:07

## 2021-06-20 RX ADMIN — HYDROXYZINE HYDROCHLORIDE 25 MG: 25 TABLET ORAL at 09:44

## 2021-06-20 RX ADMIN — SODIUM CHLORIDE, PRESERVATIVE FREE 2 ML: 5 INJECTION INTRAVENOUS at 21:09

## 2021-06-20 RX ADMIN — ALPRAZOLAM 0.5 MG: 0.25 TABLET ORAL at 20:33

## 2021-06-20 RX ADMIN — ALPRAZOLAM 0.5 MG: 0.25 TABLET ORAL at 12:40

## 2021-06-20 RX ADMIN — DOCUSATE SODIUM AND SENNOSIDES 2 TABLET: 8.6; 5 TABLET, FILM COATED ORAL at 00:21

## 2021-06-20 RX ADMIN — TIZANIDINE 2 MG: 2 TABLET ORAL at 15:55

## 2021-06-20 RX ADMIN — GABAPENTIN 600 MG: 300 CAPSULE ORAL at 03:25

## 2021-06-20 RX ADMIN — POTASSIUM CHLORIDE 40 MEQ: 1500 TABLET, EXTENDED RELEASE ORAL at 08:10

## 2021-06-20 RX ADMIN — PREDNISONE 10 MG: 10 TABLET ORAL at 08:08

## 2021-06-20 RX ADMIN — HYDROMORPHONE HYDROCHLORIDE 1 MG: 1 INJECTION, SOLUTION INTRAMUSCULAR; INTRAVENOUS; SUBCUTANEOUS at 21:09

## 2021-06-20 RX ADMIN — TIZANIDINE 2 MG: 2 TABLET ORAL at 22:59

## 2021-06-20 RX ADMIN — HYDROMORPHONE HYDROCHLORIDE 1 MG: 1 INJECTION, SOLUTION INTRAMUSCULAR; INTRAVENOUS; SUBCUTANEOUS at 16:00

## 2021-06-20 ASSESSMENT — COGNITIVE AND FUNCTIONAL STATUS - GENERAL
BECAUSE OF A PHYSICAL, MENTAL, OR EMOTIONAL CONDITION, DO YOU HAVE SERIOUS DIFFICULTY CONCENTRATING, REMEMBERING OR MAKING DECISIONS: YES
ARE YOU BLIND OR DO YOU HAVE SERIOUS DIFFICULTY SEEING, EVEN WHEN WEARING GLASSES: NO
DAILY_ACTIVITY_RAW_SCORE: 24
BASIC_MOBILITY_RAW_SCORE: 16
ARE YOU DEAF OR DO YOU HAVE SERIOUS DIFFICULTY  HEARING: NO
ARE YOU BLIND OR DO YOU HAVE SERIOUS DIFFICULTY SEEING, EVEN WHEN WEARING GLASSES: NO
DO YOU HAVE DIFFICULTY DRESSING OR BATHING: YES
DAILY_ACTIVITY_CONVERTED_SCORE: 57.54
DO YOU HAVE SERIOUS DIFFICULTY WALKING OR CLIMBING STAIRS: YES
BASIC_MOBILITY_CONVERTED_SCORE: 38.32
ARE YOU DEAF OR DO YOU HAVE SERIOUS DIFFICULTY  HEARING: NO
BECAUSE OF A PHYSICAL, MENTAL, OR EMOTIONAL CONDITION, DO YOU HAVE DIFFICULTY DOING ERRANDS ALONE: NO

## 2021-06-20 ASSESSMENT — ACTIVITIES OF DAILY LIVING (ADL)
ADL_SHORT_OF_BREATH: NO
ADL_BEFORE_ADMISSION: INDEPENDENT
ADL_SHORT_OF_BREATH: NO
ADL_SCORE: 12
PRIOR_ADL: MODIFIED INDEPENDENT
CHRONIC_PAIN_PRESENT: NO
RECENT_DECLINE_ADL: YES, DECLINE IN BATHING/DRESSING/FEEDING, COLLABORATE WITH PROVIDER (T)
ADL_BEFORE_ADMISSION: INDEPENDENT
RECENT_DECLINE_ADL: YES, DECLINE IN BATHING/DRESSING/FEEDING, COLLABORATE WITH PROVIDER (T)
ADL_SCORE: 12
MOBILITY_ASSIST_DEVICES: CANE;STANDARD WALKER
PRIOR_ADL_BATHING: INDEPENDENT
MOBILITY_ASSIST_DEVICES: CANE;WHEELCHAIR

## 2021-06-20 ASSESSMENT — ENCOUNTER SYMPTOMS
NAUSEA: 0
DIARRHEA: 0
CONFUSION: 0
EYES NEGATIVE: 1
FEVER: 0
FATIGUE: 1
HEADACHES: 0
ACTIVITY CHANGE: 1
COLOR CHANGE: 0
COUGH: 0
LIGHT-HEADEDNESS: 1
SHORTNESS OF BREATH: 0
CONSTIPATION: 1
SEIZURES: 0
SLEEP DISTURBANCE: 0
NERVOUS/ANXIOUS: 1
APPETITE CHANGE: 0
RHINORRHEA: 0
NUMBNESS: 0
WHEEZING: 0
TREMORS: 0
CHILLS: 0
BACK PAIN: 1
WEAKNESS: 1
SORE THROAT: 0
CHEST TIGHTNESS: 0
WOUND: 0
ABDOMINAL PAIN: 0

## 2021-06-20 ASSESSMENT — PATIENT HEALTH QUESTIONNAIRE - PHQ9
2. FEELING DOWN, DEPRESSED OR HOPELESS: NOT AT ALL
SUM OF ALL RESPONSES TO PHQ9 QUESTIONS 1 AND 2: 0
SUM OF ALL RESPONSES TO PHQ9 QUESTIONS 1 AND 2: 0
CLINICAL INTERPRETATION OF PHQ2 SCORE: NO FURTHER SCREENING NEEDED
IS PATIENT ABLE TO COMPLETE PHQ2 OR PHQ9: YES
1. LITTLE INTEREST OR PLEASURE IN DOING THINGS: NOT AT ALL
CLINICAL INTERPRETATION OF PHQ9 SCORE: NO FURTHER SCREENING NEEDED

## 2021-06-20 ASSESSMENT — PAIN SCALES - GENERAL
PAINLEVEL_OUTOF10: 9
PAINLEVEL_OUTOF10: 7
PAINLEVEL_OUTOF10: 8
PAINLEVEL_OUTOF10: 7
PAINLEVEL_OUTOF10: 8

## 2021-06-20 ASSESSMENT — LIFESTYLE VARIABLES
HOW MANY STANDARD DRINKS CONTAINING ALCOHOL DO YOU HAVE ON A TYPICAL DAY: 0,1 OR 2
ALCOHOL_USE_STATUS: NO OR LOW RISK WITH VALIDATED TOOL
AUDIT-C TOTAL SCORE: 0
HOW OFTEN DO YOU HAVE 6 OR MORE DRINKS ON ONE OCCASION: NEVER
HOW OFTEN DO YOU HAVE A DRINK CONTAINING ALCOHOL: NEVER

## 2021-06-20 ASSESSMENT — COLUMBIA-SUICIDE SEVERITY RATING SCALE - C-SSRS
6. HAVE YOU EVER DONE ANYTHING, STARTED TO DO ANYTHING, OR PREPARED TO DO ANYTHING TO END YOUR LIFE?: NO
IS THE PATIENT ABLE TO COMPLETE C-SSRS: YES
1. WITHIN THE PAST MONTH, HAVE YOU WISHED YOU WERE DEAD OR WISHED YOU COULD GO TO SLEEP AND NOT WAKE UP?: NO
2. HAVE YOU ACTUALLY HAD ANY THOUGHTS OF KILLING YOURSELF?: NO

## 2021-06-21 PROCEDURE — 10004651 HB RX, NO CHARGE ITEM: Performed by: HOSPITALIST

## 2021-06-21 PROCEDURE — G0378 HOSPITAL OBSERVATION PER HR: HCPCS

## 2021-06-21 PROCEDURE — 10002803 HB RX 637: Performed by: HOSPITALIST

## 2021-06-21 PROCEDURE — 10002803 HB RX 637: Performed by: FAMILY MEDICINE

## 2021-06-21 PROCEDURE — 99225 SUBSEQUENT OBSERVATION CARE LEVEL II: CPT | Performed by: HOSPITALIST

## 2021-06-21 PROCEDURE — 10002800 HB RX 250 W HCPCS: Performed by: FAMILY MEDICINE

## 2021-06-21 PROCEDURE — 90792 PSYCH DIAG EVAL W/MED SRVCS: CPT | Performed by: PSYCHIATRY & NEUROLOGY

## 2021-06-21 PROCEDURE — 10004651 HB RX, NO CHARGE ITEM: Performed by: FAMILY MEDICINE

## 2021-06-21 PROCEDURE — 96376 TX/PRO/DX INJ SAME DRUG ADON: CPT

## 2021-06-21 RX ORDER — AMOXICILLIN 250 MG
2 CAPSULE ORAL 2 TIMES DAILY
Status: DISCONTINUED | OUTPATIENT
Start: 2021-06-21 | End: 2021-06-22 | Stop reason: HOSPADM

## 2021-06-21 RX ORDER — OXYCODONE HYDROCHLORIDE 15 MG/1
15 TABLET ORAL EVERY 4 HOURS PRN
Status: DISCONTINUED | OUTPATIENT
Start: 2021-06-21 | End: 2021-06-22 | Stop reason: HOSPADM

## 2021-06-21 RX ORDER — ACETAMINOPHEN 325 MG/1
650 TABLET ORAL EVERY 6 HOURS
Status: DISCONTINUED | OUTPATIENT
Start: 2021-06-21 | End: 2021-06-22 | Stop reason: HOSPADM

## 2021-06-21 RX ORDER — POLYETHYLENE GLYCOL 3350 17 G/17G
17 POWDER, FOR SOLUTION ORAL DAILY
Status: DISCONTINUED | OUTPATIENT
Start: 2021-06-22 | End: 2021-06-22 | Stop reason: HOSPADM

## 2021-06-21 RX ADMIN — GABAPENTIN 600 MG: 300 CAPSULE ORAL at 20:32

## 2021-06-21 RX ADMIN — DOCUSATE SODIUM AND SENNOSIDES 2 TABLET: 8.6; 5 TABLET, FILM COATED ORAL at 11:46

## 2021-06-21 RX ADMIN — ALPRAZOLAM 0.5 MG: 0.25 TABLET ORAL at 20:32

## 2021-06-21 RX ADMIN — KETOROLAC TROMETHAMINE 30 MG: 30 INJECTION, SOLUTION INTRAMUSCULAR; INTRAVENOUS at 17:32

## 2021-06-21 RX ADMIN — HYDROMORPHONE HYDROCHLORIDE 1 MG: 1 INJECTION, SOLUTION INTRAMUSCULAR; INTRAVENOUS; SUBCUTANEOUS at 17:33

## 2021-06-21 RX ADMIN — SODIUM CHLORIDE, PRESERVATIVE FREE 2 ML: 5 INJECTION INTRAVENOUS at 20:38

## 2021-06-21 RX ADMIN — HYDROXYZINE HYDROCHLORIDE 25 MG: 25 TABLET ORAL at 23:11

## 2021-06-21 RX ADMIN — LIDOCAINE 1 PATCH: 560 PATCH PERCUTANEOUS; TOPICAL; TRANSDERMAL at 08:29

## 2021-06-21 RX ADMIN — DEXTROAMPHETAMINE SACCHARATE, AMPHETAMINE ASPARTATE, DEXTROAMPHETAMINE SULFATE AND AMPHETAMINE SULFATE 30 MG: 2.5; 2.5; 2.5; 2.5 TABLET ORAL at 08:26

## 2021-06-21 RX ADMIN — OXYCODONE HYDROCHLORIDE 15 MG: 15 TABLET ORAL at 11:41

## 2021-06-21 RX ADMIN — ACETAMINOPHEN 650 MG: 325 TABLET ORAL at 11:45

## 2021-06-21 RX ADMIN — FOLIC ACID 2 MG: 1 TABLET ORAL at 08:24

## 2021-06-21 RX ADMIN — KETOROLAC TROMETHAMINE 30 MG: 30 INJECTION, SOLUTION INTRAMUSCULAR; INTRAVENOUS at 00:09

## 2021-06-21 RX ADMIN — HYDROMORPHONE HYDROCHLORIDE 1 MG: 1 INJECTION, SOLUTION INTRAMUSCULAR; INTRAVENOUS; SUBCUTANEOUS at 23:49

## 2021-06-21 RX ADMIN — OXYCODONE HYDROCHLORIDE 15 MG: 15 TABLET ORAL at 16:50

## 2021-06-21 RX ADMIN — DOCUSATE SODIUM AND SENNOSIDES 2 TABLET: 8.6; 5 TABLET, FILM COATED ORAL at 20:32

## 2021-06-21 RX ADMIN — KETOROLAC TROMETHAMINE 30 MG: 30 INJECTION, SOLUTION INTRAMUSCULAR; INTRAVENOUS at 23:32

## 2021-06-21 RX ADMIN — ACETAMINOPHEN 650 MG: 325 TABLET ORAL at 17:31

## 2021-06-21 RX ADMIN — HYDROMORPHONE HYDROCHLORIDE 1 MG: 1 INJECTION, SOLUTION INTRAMUSCULAR; INTRAVENOUS; SUBCUTANEOUS at 14:27

## 2021-06-21 RX ADMIN — ALPRAZOLAM 1 MG: 1 TABLET ORAL at 19:06

## 2021-06-21 RX ADMIN — AMITRIPTYLINE HYDROCHLORIDE 75 MG: 25 TABLET, FILM COATED ORAL at 20:32

## 2021-06-21 RX ADMIN — LEUCOVORIN CALCIUM 5 MG: 5 TABLET ORAL at 08:23

## 2021-06-21 RX ADMIN — PROPRANOLOL HYDROCHLORIDE 60 MG: 20 TABLET ORAL at 08:24

## 2021-06-21 RX ADMIN — PREDNISONE 10 MG: 10 TABLET ORAL at 08:25

## 2021-06-21 RX ADMIN — ALPRAZOLAM 0.5 MG: 0.25 TABLET ORAL at 08:26

## 2021-06-21 RX ADMIN — OXYCODONE HYDROCHLORIDE 10 MG: 5 TABLET ORAL at 08:27

## 2021-06-21 RX ADMIN — OXYCODONE HYDROCHLORIDE 15 MG: 15 TABLET ORAL at 23:11

## 2021-06-21 RX ADMIN — GABAPENTIN 600 MG: 300 CAPSULE ORAL at 08:25

## 2021-06-21 RX ADMIN — ACETAMINOPHEN 650 MG: 325 TABLET ORAL at 23:32

## 2021-06-21 RX ADMIN — GABAPENTIN 600 MG: 300 CAPSULE ORAL at 14:27

## 2021-06-21 ASSESSMENT — PAIN SCALES - GENERAL
PAINLEVEL_OUTOF10: 8
PAINLEVEL_OUTOF10: 7
PAINLEVEL_OUTOF10: 8
PAINLEVEL_OUTOF10: 7
PAINLEVEL_OUTOF10: 7

## 2021-06-22 VITALS
DIASTOLIC BLOOD PRESSURE: 69 MMHG | HEART RATE: 88 BPM | TEMPERATURE: 98.2 F | OXYGEN SATURATION: 96 % | SYSTOLIC BLOOD PRESSURE: 123 MMHG | WEIGHT: 200.4 LBS | RESPIRATION RATE: 18 BRPM | BODY MASS INDEX: 34.21 KG/M2 | HEIGHT: 64 IN

## 2021-06-22 LAB
ANION GAP SERPL CALC-SCNC: 10 MMOL/L (ref 10–20)
BUN SERPL-MCNC: 9 MG/DL (ref 6–20)
BUN/CREAT SERPL: 12 (ref 7–25)
CALCIUM SERPL-MCNC: 9.2 MG/DL (ref 8.4–10.2)
CHLORIDE SERPL-SCNC: 104 MMOL/L (ref 98–107)
CO2 SERPL-SCNC: 31 MMOL/L (ref 21–32)
CREAT SERPL-MCNC: 0.77 MG/DL (ref 0.51–0.95)
DEPRECATED RDW RBC: 43.6 FL (ref 39–50)
ERYTHROCYTE [DISTWIDTH] IN BLOOD: 13.2 % (ref 11–15)
FASTING DURATION TIME PATIENT: ABNORMAL H
GFR SERPLBLD BASED ON 1.73 SQ M-ARVRAT: >90 ML/MIN/1.73M2
GLUCOSE SERPL-MCNC: 109 MG/DL (ref 65–99)
HCT VFR BLD CALC: 35.6 % (ref 36–46.5)
HGB BLD-MCNC: 11.8 G/DL (ref 12–15.5)
MCH RBC QN AUTO: 30.3 PG (ref 26–34)
MCHC RBC AUTO-ENTMCNC: 33.1 G/DL (ref 32–36.5)
MCV RBC AUTO: 91.3 FL (ref 78–100)
NRBC BLD MANUAL-RTO: 0 /100 WBC
PLATELET # BLD AUTO: 282 K/MCL (ref 140–450)
POTASSIUM SERPL-SCNC: 3.6 MMOL/L (ref 3.4–5.1)
RAINBOW EXTRA TUBES HOLD SPECIMEN: NORMAL
RBC # BLD: 3.9 MIL/MCL (ref 4–5.2)
SODIUM SERPL-SCNC: 141 MMOL/L (ref 135–145)
WBC # BLD: 8.2 K/MCL (ref 4.2–11)

## 2021-06-22 PROCEDURE — 99217 OBSERVATION CARE DISCHARGE: CPT | Performed by: HOSPITALIST

## 2021-06-22 PROCEDURE — 10004651 HB RX, NO CHARGE ITEM: Performed by: HOSPITALIST

## 2021-06-22 PROCEDURE — 10002803 HB RX 637: Performed by: HOSPITALIST

## 2021-06-22 PROCEDURE — 96376 TX/PRO/DX INJ SAME DRUG ADON: CPT

## 2021-06-22 PROCEDURE — 36415 COLL VENOUS BLD VENIPUNCTURE: CPT | Performed by: HOSPITALIST

## 2021-06-22 PROCEDURE — G0378 HOSPITAL OBSERVATION PER HR: HCPCS

## 2021-06-22 PROCEDURE — 10004651 HB RX, NO CHARGE ITEM: Performed by: FAMILY MEDICINE

## 2021-06-22 PROCEDURE — 10002800 HB RX 250 W HCPCS: Performed by: HOSPITALIST

## 2021-06-22 PROCEDURE — 85027 COMPLETE CBC AUTOMATED: CPT | Performed by: HOSPITALIST

## 2021-06-22 PROCEDURE — 80048 BASIC METABOLIC PNL TOTAL CA: CPT | Performed by: HOSPITALIST

## 2021-06-22 PROCEDURE — 10002803 HB RX 637: Performed by: FAMILY MEDICINE

## 2021-06-22 PROCEDURE — 10002800 HB RX 250 W HCPCS: Performed by: FAMILY MEDICINE

## 2021-06-22 RX ORDER — POLYETHYLENE GLYCOL 3350 17 G/17G
17 POWDER, FOR SOLUTION ORAL DAILY
Qty: 30 EACH | Refills: 0 | Status: SHIPPED | OUTPATIENT
Start: 2021-06-23

## 2021-06-22 RX ORDER — LIDOCAINE 4 G/G
1 PATCH TOPICAL DAILY
Qty: 30 PATCH | Refills: 0 | Status: SHIPPED | OUTPATIENT
Start: 2021-06-23

## 2021-06-22 RX ORDER — IBUPROFEN 800 MG/1
800 TABLET ORAL EVERY 6 HOURS
Qty: 28 TABLET | Refills: 0 | Status: SHIPPED | OUTPATIENT
Start: 2021-06-22

## 2021-06-22 RX ORDER — ONDANSETRON 4 MG/1
4 TABLET, ORALLY DISINTEGRATING ORAL EVERY 8 HOURS PRN
Qty: 10 TABLET | Refills: 0 | Status: SHIPPED | OUTPATIENT
Start: 2021-06-22

## 2021-06-22 RX ORDER — OXYCODONE HYDROCHLORIDE 10 MG/1
15 TABLET ORAL EVERY 6 HOURS PRN
Qty: 18 TABLET | Refills: 0 | Status: SHIPPED | OUTPATIENT
Start: 2021-06-22 | End: 2021-06-22 | Stop reason: HOSPADM

## 2021-06-22 RX ORDER — ALPRAZOLAM 1 MG/1
1 TABLET, EXTENDED RELEASE ORAL DAILY
Qty: 3 TABLET | Refills: 0 | Status: SHIPPED | OUTPATIENT
Start: 2021-06-22

## 2021-06-22 RX ORDER — ALPRAZOLAM 1 MG/1
1 TABLET ORAL 2 TIMES DAILY PRN
Qty: 6 TABLET | Refills: 0 | Status: SHIPPED | OUTPATIENT
Start: 2021-06-22

## 2021-06-22 RX ORDER — ACETAMINOPHEN 325 MG/1
650 TABLET ORAL EVERY 6 HOURS
Qty: 120 TABLET | Refills: 0 | Status: SHIPPED | OUTPATIENT
Start: 2021-06-22

## 2021-06-22 RX ORDER — OXYCODONE HYDROCHLORIDE 10 MG/1
15 TABLET ORAL EVERY 6 HOURS PRN
Qty: 18 TABLET | Refills: 0 | Status: SHIPPED | OUTPATIENT
Start: 2021-06-22

## 2021-06-22 RX ORDER — AMOXICILLIN 250 MG
2 CAPSULE ORAL 2 TIMES DAILY
Qty: 120 TABLET | Refills: 0 | Status: SHIPPED | OUTPATIENT
Start: 2021-06-22

## 2021-06-22 RX ORDER — ALPRAZOLAM 1 MG/1
1 TABLET ORAL 2 TIMES DAILY PRN
Qty: 6 TABLET | Refills: 0 | Status: SHIPPED | OUTPATIENT
Start: 2021-06-22 | End: 2021-06-22 | Stop reason: SDUPTHER

## 2021-06-22 RX ORDER — POTASSIUM CHLORIDE 20 MEQ/1
40 TABLET, EXTENDED RELEASE ORAL ONCE
Status: COMPLETED | OUTPATIENT
Start: 2021-06-22 | End: 2021-06-22

## 2021-06-22 RX ADMIN — OXYCODONE HYDROCHLORIDE 15 MG: 15 TABLET ORAL at 07:44

## 2021-06-22 RX ADMIN — OXYCODONE HYDROCHLORIDE 15 MG: 15 TABLET ORAL at 03:40

## 2021-06-22 RX ADMIN — ACETAMINOPHEN 650 MG: 325 TABLET ORAL at 06:03

## 2021-06-22 RX ADMIN — POLYETHYLENE GLYCOL 3350 17 G: 17 POWDER, FOR SOLUTION ORAL at 08:11

## 2021-06-22 RX ADMIN — POTASSIUM CHLORIDE 40 MEQ: 1500 TABLET, EXTENDED RELEASE ORAL at 09:27

## 2021-06-22 RX ADMIN — SODIUM CHLORIDE, PRESERVATIVE FREE 2 ML: 5 INJECTION INTRAVENOUS at 08:15

## 2021-06-22 RX ADMIN — HYDROMORPHONE HYDROCHLORIDE 1 MG: 1 INJECTION, SOLUTION INTRAMUSCULAR; INTRAVENOUS; SUBCUTANEOUS at 06:51

## 2021-06-22 RX ADMIN — HYDROXYZINE HYDROCHLORIDE 25 MG: 25 TABLET ORAL at 06:03

## 2021-06-22 RX ADMIN — GABAPENTIN 600 MG: 300 CAPSULE ORAL at 08:07

## 2021-06-22 RX ADMIN — HYDROMORPHONE HYDROCHLORIDE 1 MG: 1 INJECTION, SOLUTION INTRAMUSCULAR; INTRAVENOUS; SUBCUTANEOUS at 03:40

## 2021-06-22 RX ADMIN — PROCHLORPERAZINE EDISYLATE 5 MG: 5 INJECTION INTRAMUSCULAR; INTRAVENOUS at 00:03

## 2021-06-22 RX ADMIN — TIZANIDINE 2 MG: 2 TABLET ORAL at 03:40

## 2021-06-22 RX ADMIN — DOCUSATE SODIUM AND SENNOSIDES 2 TABLET: 8.6; 5 TABLET, FILM COATED ORAL at 08:08

## 2021-06-22 RX ADMIN — LIDOCAINE 1 PATCH: 560 PATCH PERCUTANEOUS; TOPICAL; TRANSDERMAL at 08:12

## 2021-06-22 RX ADMIN — PROPRANOLOL HYDROCHLORIDE 60 MG: 20 TABLET ORAL at 08:09

## 2021-06-22 RX ADMIN — ALPRAZOLAM 0.5 MG: 0.25 TABLET ORAL at 08:10

## 2021-06-22 RX ADMIN — PREDNISONE 10 MG: 10 TABLET ORAL at 08:09

## 2021-06-22 RX ADMIN — KETOROLAC TROMETHAMINE 30 MG: 30 INJECTION, SOLUTION INTRAMUSCULAR; INTRAVENOUS at 05:59

## 2021-06-22 RX ADMIN — DEXTROAMPHETAMINE SACCHARATE, AMPHETAMINE ASPARTATE, DEXTROAMPHETAMINE SULFATE AND AMPHETAMINE SULFATE 30 MG: 2.5; 2.5; 2.5; 2.5 TABLET ORAL at 08:10

## 2021-06-22 RX ADMIN — ALPRAZOLAM 1 MG: 1 TABLET ORAL at 06:04

## 2021-06-22 RX ADMIN — PROCHLORPERAZINE EDISYLATE 5 MG: 5 INJECTION INTRAMUSCULAR; INTRAVENOUS at 05:55

## 2021-06-22 RX ADMIN — OXYCODONE HYDROCHLORIDE 15 MG: 15 TABLET ORAL at 12:16

## 2021-06-22 RX ADMIN — ALUMINA, MAGNESIA, AND SIMETHICONE ORAL SUSPENSION REGULAR STRENGTH 30 ML: 1200; 1200; 120 SUSPENSION ORAL at 09:28

## 2021-06-22 RX ADMIN — FOLIC ACID 2 MG: 1 TABLET ORAL at 08:08

## 2021-06-22 ASSESSMENT — PAIN SCALES - GENERAL
PAINLEVEL_OUTOF10: 7
PAINLEVEL_OUTOF10: 8
PAINLEVEL_OUTOF10: 7
PAINLEVEL_OUTOF10: 6
PAINLEVEL_OUTOF10: 7

## 2021-12-07 ENCOUNTER — TRANSCRIBE ORDER (OUTPATIENT)
Dept: REGISTRATION | Age: 31
End: 2021-12-07

## 2021-12-07 DIAGNOSIS — Z01.812 PRE-PROCEDURE LAB EXAM: Primary | ICD-10-CM

## 2021-12-09 ENCOUNTER — TELEPHONE (OUTPATIENT)
Dept: CARDIOLOGY CLINIC | Age: 31
End: 2021-12-09

## 2021-12-09 NOTE — TELEPHONE ENCOUNTER
Patient called requesting an appt for a pre-opt clearance.       [facility]: Dr. Alec Otero   Surg: Total Right Hip Replacement   Surg Date: 12/27/21      Patient Phone: [phone]      *Dr. Nette Mobley does not have any availability before patients surgery

## 2021-12-10 ENCOUNTER — HOSPITAL ENCOUNTER (OUTPATIENT)
Dept: PREADMISSION TESTING | Age: 31
Discharge: HOME | End: 2021-12-10
Payer: MEDICARE

## 2021-12-10 VITALS
SYSTOLIC BLOOD PRESSURE: 151 MMHG | TEMPERATURE: 98.2 F | BODY MASS INDEX: 32.93 KG/M2 | HEIGHT: 64 IN | DIASTOLIC BLOOD PRESSURE: 79 MMHG | WEIGHT: 192.9 LBS | HEART RATE: 100 BPM

## 2021-12-10 LAB
ABO + RH BLD: NORMAL
ANION GAP SERPL CALC-SCNC: 5 MMOL/L (ref 5–15)
APPEARANCE UR: ABNORMAL
BACTERIA URNS QL MICRO: NEGATIVE /HPF
BILIRUB UR QL: NEGATIVE
BLOOD GROUP ANTIBODIES SERPL: NORMAL
BUN SERPL-MCNC: 7 MG/DL (ref 6–20)
BUN/CREAT SERPL: 10 (ref 12–20)
CALCIUM SERPL-MCNC: 9.7 MG/DL (ref 8.5–10.1)
CHLORIDE SERPL-SCNC: 106 MMOL/L (ref 97–108)
CO2 SERPL-SCNC: 26 MMOL/L (ref 21–32)
COLOR UR: ABNORMAL
CREAT SERPL-MCNC: 0.69 MG/DL (ref 0.55–1.02)
EPITH CASTS URNS QL MICRO: ABNORMAL /LPF
ERYTHROCYTE [DISTWIDTH] IN BLOOD BY AUTOMATED COUNT: 13.2 % (ref 11.5–14.5)
EST. AVERAGE GLUCOSE BLD GHB EST-MCNC: 105 MG/DL
GLUCOSE SERPL-MCNC: 98 MG/DL (ref 65–100)
GLUCOSE UR STRIP.AUTO-MCNC: NEGATIVE MG/DL
HBA1C MFR BLD: 5.3 % (ref 4–5.6)
HCG SERPL QL: NEGATIVE
HCT VFR BLD AUTO: 38.9 % (ref 35–47)
HGB BLD-MCNC: 12.7 G/DL (ref 11.5–16)
HGB UR QL STRIP: NEGATIVE
INR PPP: 1 (ref 0.9–1.1)
KETONES UR QL STRIP.AUTO: 15 MG/DL
LEUKOCYTE ESTERASE UR QL STRIP.AUTO: NEGATIVE
MCH RBC QN AUTO: 30 PG (ref 26–34)
MCHC RBC AUTO-ENTMCNC: 32.6 G/DL (ref 30–36.5)
MCV RBC AUTO: 92 FL (ref 80–99)
NITRITE UR QL STRIP.AUTO: NEGATIVE
NRBC # BLD: 0 K/UL (ref 0–0.01)
NRBC BLD-RTO: 0 PER 100 WBC
PH UR STRIP: 5 [PH] (ref 5–8)
PLATELET # BLD AUTO: 346 K/UL (ref 150–400)
PMV BLD AUTO: 10.8 FL (ref 8.9–12.9)
POTASSIUM SERPL-SCNC: 3.8 MMOL/L (ref 3.5–5.1)
PROT UR STRIP-MCNC: NEGATIVE MG/DL
PROTHROMBIN TIME: 10.8 SEC (ref 9–11.1)
RBC # BLD AUTO: 4.23 M/UL (ref 3.8–5.2)
RBC #/AREA URNS HPF: ABNORMAL /HPF (ref 0–5)
SODIUM SERPL-SCNC: 137 MMOL/L (ref 136–145)
SP GR UR REFRACTOMETRY: 1.02 (ref 1–1.03)
SPECIMEN EXP DATE BLD: NORMAL
UA: UC IF INDICATED,UAUC: ABNORMAL
UROBILINOGEN UR QL STRIP.AUTO: 0.2 EU/DL (ref 0.2–1)
WBC # BLD AUTO: 8.8 K/UL (ref 3.6–11)
WBC URNS QL MICRO: ABNORMAL /HPF (ref 0–4)

## 2021-12-10 PROCEDURE — 86901 BLOOD TYPING SEROLOGIC RH(D): CPT

## 2021-12-10 PROCEDURE — 83036 HEMOGLOBIN GLYCOSYLATED A1C: CPT

## 2021-12-10 PROCEDURE — 80048 BASIC METABOLIC PNL TOTAL CA: CPT

## 2021-12-10 PROCEDURE — 84703 CHORIONIC GONADOTROPIN ASSAY: CPT

## 2021-12-10 PROCEDURE — 36415 COLL VENOUS BLD VENIPUNCTURE: CPT

## 2021-12-10 PROCEDURE — 85027 COMPLETE CBC AUTOMATED: CPT

## 2021-12-10 PROCEDURE — 85610 PROTHROMBIN TIME: CPT

## 2021-12-10 PROCEDURE — 81001 URINALYSIS AUTO W/SCOPE: CPT

## 2021-12-10 PROCEDURE — 93005 ELECTROCARDIOGRAM TRACING: CPT

## 2021-12-10 RX ORDER — MORPHINE SULFATE 30 MG/1
30 TABLET ORAL
COMMUNITY
End: 2022-01-07 | Stop reason: ALTCHOICE

## 2021-12-10 RX ORDER — METOPROLOL SUCCINATE 25 MG/1
25 TABLET, EXTENDED RELEASE ORAL DAILY
Status: ON HOLD | COMMUNITY
End: 2022-03-02

## 2021-12-10 RX ORDER — CYCLOBENZAPRINE HCL 10 MG
10 TABLET ORAL
COMMUNITY

## 2021-12-10 NOTE — TELEPHONE ENCOUNTER
Patient is scheduled to see Dr Phillip Hinojosa on Tuesday, December 14th at 2:20 pm.    Patient verbalized understanding.

## 2021-12-10 NOTE — PERIOP NOTES
[facility]    Surgery Date:   12/27/21    [facility] staff will call you between 4 PM- 8 PM the day before surgery with your arrival time. If your surgery is on a Monday, we will call you the preceding Friday. Please call [phone] after 8 PM if you did not receive your arrival time. 1. Please report to [facility] Patient Access/Admitting on the 1st floor. Bring your insurance card, photo identification, and any copayment ( if applicable). 2. If you are going home the same day of your surgery, you must have a responsible adult to drive you home. You need to have a responsible adult to stay with you the first 24 hours after surgery and you should not drive a car for 24 hours following your surgery. 3. Do NOT eat any solid foods after midnight the night before surgery including candy, mints or gum. You may drink clear liquids from midnight until 1 hour prior to arrival time. You may drink up to 12 ounces at one time every 4 hours. 4. Do NOT drink alcohol or smoke 24 hours before surgery. STOP smoking for 14 days prior as it helps with breathing and healing after surgery. 5. If your arrival time is 3pm or later, you may eat a light breakfast before 8am (toast, bagel-no butter, black coffee, plain tea, fruit juice-no pulp) Please note special instructions, if applicable, below for medications. 6. If you are being admitted to the hospital,please leave personal belongings/luggage in your car until you have an assigned hospital room number. 7. Please wear comfortable clothes. Wear your glasses instead of contacts. We ask that all money, jewelry and valuables be left at home. Wear no make up, particularly mascara, the day of surgery. 8.  All body piercings, rings, and jewelry need to be removed and left at home. Please wear your hair loose or down. Please no pony-tails, buns, or any metal hair accessories.  If you shower the morning of surgery, please do not apply any lotions or powders afterwards. You may wear deodorant. Do not shave any body area within 24 hours of your surgery. 9. Please follow all instructions to avoid any potential surgical cancellation. 10. Should your physical condition change, (i.e. fever, cold, flu, etc.) please notify your surgeon as soon as possible. 11. It is important to be on time. If a situation occurs where you may be delayed, please call:  [phone] / [phone] on the day of surgery. 12. The Preadmission Testing staff can be reached at [phone]. 13. Special instructions: NA    Current Outpatient Medications   Medication Sig    cyclobenzaprine (FLEXERIL) 10 mg tablet Take 10 mg by mouth three (3) times daily as needed for Muscle Spasm(s).  morphine IR (MS IR) 30 mg tablet Take 30 mg by mouth every eight (8) hours as needed for Pain.  metoprolol succinate (TOPROL-XL) 25 mg XL tablet Take 25 mg by mouth daily.  OTHER TART CHERRY SUPPLEMENT 2 TABS DAILY    TURMERIC PO Take 2 Tablets by mouth daily.  OTHER MITOCHONDRIAL MAXIMIZER - 1 TAB BID    OTHER CBD AND THC - EDIBLES , USES THEM DAILY - PATIENT TOLD PER ANESTHESIA PROTOCOL , TO STOP 7 DAYS PRIOR TO SURGERY.  ALPRAZolam (Xanax XR) 1 mg XR tablet Take  by mouth every morning.  levOCARNitine Tartrate (L-CARNITINE, TARTRATE,) 500 mg cap Take 1,500 mg by mouth three (3) times daily.  PNV72-iron carb,glu-FA-dss-dha (CITRANATAL 90 DHA, ALGAL OIL,) 90 mg iron-1 mg -50 mg-300 mg cmpk daily.  gabapentin (NEURONTIN) 600 mg tablet Take 600 mg by mouth three (3) times daily.  DEXTROAMPHETAMINE/AMPHETAMINE (AMPHETAMINE SALT COMBO PO) Take 15 mg by mouth two (2) times a day.  folic acid (FOLVITE) 1 mg tablet Take 1 mg by mouth daily.  ALPRAZolam (XANAX) 0.5 mg tablet Take 1 mg by mouth two (2) times a day.  oxyCODONE IR (ROXICODONE) 10 mg tab immediate release tablet Take 10 mg by mouth every four (4) hours as needed.      No current facility-administered medications for this encounter. 1. YOU MUST ONLY TAKE THESE MEDICATIONS THE MORNING OF SURGERY WITH A SIP OF WATER:  METOPROLOL, GABAPENTIN, L CARNITINE, MITOCHONDRIAL MAXIMIZER   2. MEDICATIONS TO TAKE THE MORNING OF SURGERY ONLY IF NEEDED: MORPHINE OR ROXICODONE, LAST DOSE 4 HOURS PRIOR TO ARRIVAL TIME ON DAY OF SURGERY. 3. HOLD these medications BEFORE Surgery: CBD/THC EDIBLES, TURMERIC, TART CHERRY, PRENATAL VITAMIN FOR 7 DAYS PRIOR TO SURGERY. SKIP AMPHETAMINE COMBO PILL MORNING OF SURGERY ONLY . 4. Ask your surgeon/prescribing physician about when/if to STOP taking these medications: CHECK WITH DR. MICHELLE ABOUT GETTING FLU SHOT PRIOR TO SURGERY. 5. Stop Aspirin and/or any non-steroidal anti-inflammatory drugs (i.e. Ibuprofen, Naproxen, Advil, Aleve) as directed by your surgeon. You may take Tylenol. Stop herbal supplements 1 week prior to  surgery. 6. If you are currently taking Plavix, Coumadin, or any other blood-thinning/anticoagulant medication contact your prescribing physician for instructions. Preventing Infections Before and After - Your Surgery    IMPORTANT INSTRUCTIONS    Please read and follow these instructions carefully. If you are unable to comply with the below instructions your procedure will be cancelled. You play an important role in your health and preparation for surgery. To reduce the germs on your skin you will need to shower with CHG soap (Chorhexidine gluconate 4%) two times before surgery. CHG soap (Hibiclens, Hex-A-Clens or store brand)   CHG soap will be provided at your Preadmission Testing (PAT) appointment.  If you do not have a PAT appointment before surgery, you may arrange to  CHG soap from our office or purchase CHG soap at a pharmacy, grocery or department store.  You need to purchase TWO 4 ounce bottles to use for your 2 showers. Steps to follow:  1.  Wash your hair with your normal shampoo and your body with regular soap and rinse well to remove shampoo and soap from your skin. 2. Wet a clean washcloth and turn off the shower. 3. Put CHG soap on washcloth and apply to your entire body from the neck down. Do not use on your head, face or private parts(genitals). Do not use CHG soap on open sores, wounds or areas of skin irritation. 4. Wash you body gently for 5 minutes. Do not wash your skin too hard. This soap does not create lather. Pay special attention to your underarms and from your belly button to your feet. 5. Turn the shower back on and rinse well to get CHG soap off your body. 6. Pat your skin dry with a clean, dry towel. Do not apply lotions or moisturizer. 7. Put on clean clothes and sleep on fresh bed sheets and do not allow pets to sleep with you. Shower with CHG soap 2 times before your surgery   The evening before your surgery   The morning of your surgery      Tips to help prevent infections after your surgery:  1. Protect your surgical wound from germs:  ? Hand washing is the most important thing you and your caregivers can do to prevent infections. ? Keep your bandage clean and dry! ? Do not touch your surgical wound. 2. Use clean, freshly washed towels and washcloths every time you shower; do not share bath linens with others. 3. Until your surgical wound is healed, wear clothing and sleep on bed linens each day that are clean and freshly washed. 4. Do not allow pets to sleep in your bed with you or touch your surgical wound. 5. Do not smoke - smoking delays wound healing. This may be a good time to stop smoking. 6. If you have diabetes, it is important for you to manage your blood sugar levels properly before your surgery as well as after your surgery. Poorly managed blood sugar levels slow down wound healing and prevent you from healing completely.     Prevention of Infection  Testing for Staphylococcus aureus on your skin before surgery    Staphylococcus aureus (staph) is a common bacteria that is found on the body. It normally does not cause infection on healthy skin. Before surgery, you will be tested to see if you have staph by swabbing the inside of your nose. When you have an incision with surgery, the goal is to protect that incision from infection. Removal of the staph bacteria before surgery can decrease the risk of a surgical site infection. If your nose swab is positive for staph you will be called. Your treatment will include 2 steps:   Prescription for Mupirocin ointment to be used in each nostril twice a day for 5 days.  Showering with Chlorhexidine (CHG) liquid soap for 5 days prior to surgery. How to use Mupirocin ointment in your nose  1.  the prescription from your pharmacy. You will receive a large tube of ointment which will be big enough for all of your treatments. You will apply this ointment to each nostril 2 times a day for 5 days. 2. Wash your hands with  gel or soap and water for 20 seconds before using ointment. 3. Place a pea-sized amount of ointment on a cotton Q-tip. 4. Apply ointment just inside of each nostril with the Q-tip. Do not push Q-tip or ointment deep inside you nose. 5. Press your nostrils together and massage for a few seconds. 6. Wash your hands with  gel or soap and water after you are finished. 7. Do not get ointment near your eyes. If it gets into your eyes, rinse them with cool water. 8. If you need to use nasal spray, clean the tip of the bottle with alcohol before use and do not use both at the same time. 9. If you are scheduled for COVID testing during the 5 days, do NOT apply morning dose until after the COVID test has been performed. How to use Chlorhexidine (CHG) 4% liquid soap  1. Purchase an 8 ounce bottle of CHG liquid soap (Chlorhexidine 4%, Hibiclens, Hex-A-Clens or store brand) at a pharmacy or grocery store.   2. Wash your hair with your normal shampoo and your body with regular soap and rinse well to remove shampoo and soap from your skin. 3. Wet a clean washcloth and turn off the shower. 4. Put CHG soap on washcloth and apply to your entire body from the neck down. Do not use on your head, face or private parts(genitals). Do not use CHG soap on open sores, wounds or areas of skin irritation. 5. Wash your body gently for 5 minutes. Do not wash your skin too hard. This soap does not create lather. Pay special attention to your underarms and from your belly button to your feet. 6. Turn the shower back on and rinse well to get CHG soap off your body. 7. Pat your skin dry with a clean, dry towel. Do not apply lotions or moisturizer. 8. Put on clean clothes and sleep on fresh bed sheets the night before surgery. Do not allow pets to sleep with you. Eating and Drinking Before Surgery     You may eat a regular dinner at the usual time on the day before your surgery.  Do NOT eat any solid foods after midnight unless your arrival time at the hospital is 3pm or later.  You may drink clear liquids only from 12 midnight until 1 hours prior to your arrival time at the hospital on the day of your surgery. Do NOT drink alcohol.    Clear liquids include:  o Water  o Fruit juices without pulp( i.e. apple juice)  o Carbonated beverages  o Black coffee (no cream/milk)  o Tea (no cream/milk)  o Gatorade   You may drink up to 12-16 ounces at one time every 4 hours between the hours of midnight and 1 hour before your arrival time at the hospital. Example- if your arrival time at the hospital is 6am, you may drink 12-16 ounces of clear liquids no later than 5am.   If your arrival time at the hospital is 3pm or later, you may eat a light breakfast before 8am.   A light breakfast includes:  o Toast or bagel (no butter)  o Black coffee (no cream/milk)  o Tea (no cream/milk)  o Fruit juices without pulp ( i.e. apple juice)  o Do NOT eat meat, eggs, vegetables or fruit   If you have any questions, please contact your surgeon's office. [facility]   Instructions for Pre-Surgery COVID-19 Testing     Across our ministry we have established standard guidelines to ensure the health and safety of our patients, residents and associates as we resume elective services for patients. All patients presenting for surgery are required to have a COVID-19 test result within 96 hours of their scheduled surgery. [employer] is providing this test free of charge to the patient. Instructions for COVID-19 Testing:     Patients will receive a call from Pre-Admission Testing 4-5 days prior to surgery to schedule a date and time to come to the [address] [place] [address] Drive for their COVID-19 test   Patients are advised to self-quarantine after testing until their scheduled surgery   Once on site, patients will be registered and receive COVID test in their vehicle   If a patient is scheduled for normal Pre Admission Testing 96 hours from date of surgery, the patient will still have their COVID test done at the [address] [place] [address] located at [address] [place] [address] Positive results will be shared with the surgeon and anesthesiologist and may result in cancellation of the elective procedure    Testing Hours and Location:   Address:  [address] [place] [address] Up Pre Admission [address] [facility] in the Discharge Lot on [facility] (Map Attached)  [address] [place] [address], [address]   Hours: Monday- Friday 7a-3p, Saturday and Sunday 7a-10a    PAT Phone Number: [phone]      Prevention of Infection  Testing for Staphylococcus aureus on your skin before surgery    Staphylococcus aureus (staph) is a common bacteria that is found on the body. It normally does not cause infection on healthy skin. Before surgery, you will be tested to see if you have staph by swabbing the inside of your nose. When you have an incision with surgery, the goal is to protect that incision from infection. Removal of the staph bacteria before surgery can decrease the risk of a surgical site infection. If your nose swab is positive for staph you will be called. Your treatment will include 2 steps:   Prescription for Mupirocin ointment to be used in each nostril twice a day for 5 days.  Showering with Chlorhexidine (CHG) liquid soap for 5 days prior to surgery. How to use Mupirocin ointment in your nose  14.  the prescription from your pharmacy. You will receive a large tube of ointment which will be big enough for all of your treatments. You will apply this ointment to each nostril 2 times a day for 5 days. 15. Wash your hands with  gel or soap and water for 20 seconds before using ointment. 16. Place a pea-sized amount of ointment on a cotton Q-tip. 17. Apply ointment just inside of each nostril with the Q-tip. Do not push Q-tip or ointment deep inside you nose. 18. Press your nostrils together and massage for a few seconds. [place] your hands with  gel or soap and water after you are finished. 20. Do not get ointment near your eyes. If it gets into your eyes, rinse them with cool water. 21. If you need to use nasal spray, clean the tip of the bottle with alcohol before use and do not use both at the same time. 22. If you are scheduled for COVID testing during the 5 days, do NOT apply morning dose until after the COVID test has been performed. How to use Chlorhexidine (CHG) 4% liquid soap  7. Purchase an 8 ounce bottle of CHG liquid soap (Chlorhexidine 4%, Hibiclens, Hex-A-Clens or store brand) at a pharmacy or grocery store. 8. Wash your hair with your normal shampoo and your body with regular soap and rinse well to remove shampoo and soap from your skin. 9. Wet a clean washcloth and turn off the shower. 10. Put CHG soap on washcloth and apply to your entire body from the neck down. Do not use on your head, face or private parts(genitals).  Do not use CHG soap on open sores, wounds or areas of skin irritation. 11. Wash your body gently for 5 minutes. Do not wash your skin too hard. This soap does not create lather. Pay special attention to your underarms and from your belly button to your feet. 12. Turn the shower back on and rinse well to get CHG soap off your body. 13. Pat your skin dry with a clean, dry towel. Do not apply lotions or moisturizer. 14. Put on clean clothes and sleep on fresh bed sheets the night before surgery. Do not allow pets to sleep with you. Patient Information Regarding COVID Restrictions    Patients are advised to self-quarantine after COVID testing up to the day of the scheduled procedure. Day of Procedure     Please park in the parking deck or any designated visitor parking lot.  Enter the facility through the Main Entrance of the hospital.   A temperature check and appropriate symptom/exposure screening will be done prior to entry to the facility.  On the day of surgery, please provide the cell phone number of the person who will be waiting for you to the Patient Access representative at the time of registration.  Please wear a mask on the day of your procedure.  We are now allowing one designated visitor per stay. Pediatric patients may have 2 designated visitors. This one person may come in with you on the day of your procedure.  No visitors under the age of 13.  The designated visitor must also wear a mask.  Once your procedure and the immediate recovery period is completed, a nurse in the recovery area will contact your designated visitor to inform them of your room number or to otherwise review other pertinent information regarding your care.  Social distancing practices are to be adhered to in waiting areas and the cafeteria. The patient was contacted  in person. She  verbalize  understanding of all instructions does not  need reinforcement.

## 2021-12-10 NOTE — PERIOP NOTES
PATIENT GIVEN SURGICAL SITE INFECTION FAQ HANDOUT AND HAND WASHING TIP SHEET. PREOP INSTRUCTIONS REVIEWED AND PATIENT VERBALIZES UNDERSTANDING OF INSTRUCTIONS. PATIENT HAS BEEN GIVEN THE OPPORTUNITY TO ASK ADDITIONAL QUESTIONS. GAVE PATIENT 2 BOTTLES OF CHG CLEANSER AND INSTRUCTIONS. PATIENT VERBALIZED UNDERSTANDING. PATIENT CALLED AND MADE AWARE OF COVID-19 TESTING NEEDED TO BE DONE WITHIN 96 HOURS OF SURGERY. COVID-19 TESTING APPOINTMENT MADE FOR PATIENT. PATIENT INSTRUCTED ON SELF QUARANTINE BETWEEN TESTING AND ARRIVAL TIME DAY OF SURGERY. PRINTED DOCUMENT PATIENT SENT VIA EMAIL TO PRE ADMIT TESTING EMAIL FOR SURGEON AND ANESTHESIOLOGY. GAVE PATIENT MEDICATION INSTRUCTIONS. PATIENT SEES DR. BONILLA (CARDIO )  [phone] AND HAS UPCOMING APPT. 12/14/21 , EKG DONE IN P.A.T. THOUGH, PATIENT SEES PAIN MANAGEMENT DOCTORS  [facility] AND  [address] [place] [address] , [address] [place] [address] PATIENT SEES RHEUM - DR. Aniceto [place] [address] [facility], [address] [place] [address] PATIENT CAN AMBULATE SHORT DISTANCE, PATIENT IS IN WHEELCHAIR , HAS A CANE. MADE A COPY OF COVID 19 VACCINE CARD AND PLACED ON CHART.

## 2021-12-11 LAB
BACTERIA SPEC CULT: NORMAL
BACTERIA SPEC CULT: NORMAL
SERVICE CMNT-IMP: NORMAL

## 2021-12-12 LAB
ATRIAL RATE: 94 BPM
CALCULATED P AXIS, ECG09: 48 DEGREES
CALCULATED R AXIS, ECG10: 27 DEGREES
CALCULATED T AXIS, ECG11: 38 DEGREES
DIAGNOSIS, 93000: NORMAL
P-R INTERVAL, ECG05: 134 MS
Q-T INTERVAL, ECG07: 360 MS
QRS DURATION, ECG06: 88 MS
QTC CALCULATION (BEZET), ECG08: 450 MS
VENTRICULAR RATE, ECG03: 94 BPM

## 2021-12-13 NOTE — PERIOP NOTES
PAT Nurse Practitioner   Pre-Operative Chart Review/Assessment:-ORTHOPEDIC                Patient Name:  Nehemias                                                            Age:   32 y.o.    :  1990     Today's Date:  2021     Date of PAT:   12/10/2021      Date of Surgery:    2021      Procedure(s):  Right Total Hip Arthroplasty     Surgeon:   Dr. Germaine Carr:      1)  Medical Clearance:  Nahid Almeida NP      2)  Cardiac Clearance:  EKG and METs reviewed. No further cardiac evaluation requested. 3)  Diabetic Treatment Consult:  Not indicated. A1c-5.3      4)  Sleep Apnea evaluation:   SNOW score of 4. Pt reports loud snoring that can be heard through a closed door, daytime fatigue and a diagnosis of HTN. Pt denies witnessed pauses in breathing. Referral sent to PCP for F/U and recommendations. 5) Treatment for MRSA/Staph Aureus:  Neg      6) Additional Concerns:  Hx of a-fib, fibromyalgia, Mg-Danlos, Henoch Schonlein purpura(IgA vasculitis), hx of lumbar fusion                Vital Signs:         Vitals:    12/10/21 1537   BP: (!) 151/79   Pulse: 100   Temp: 98.2 °F (36.8 °C)   Weight: 87.5 kg (192 lb 14.4 oz)   Height: 5' 4\" (1.626 m)            ____________________________________________  PAST MEDICAL HISTORY  Past Medical History:   Diagnosis Date    ADHD (attention deficit hyperactivity disorder)     dx Dr. Brooke Wilson in [address]  Adverse effect of anesthesia     [address], NOTE ABOUT WHAT ANESTHESIA WORKS BEST FOR HER MITOCHONDRIAL DISEASE.  Arrhythmia     TACHYCARDIA , HISTORY OF AFIB - SEES DR. BONILLA      Arthritis     Carnitine deficiency ([address]     Chronic pain     from myositis, myopathy    Mg-Danlos syndrome     FH: rheumatoid arthritis 10/1/2013    Fibromyalgia     HSP (Henoch Schonlein purpura) ([address] 2014    occurs w myopathy flares    Muscle pain     Muscle weakness     Myopathy  Dr. Areli Colon, Dr. Trish Driscoll, genetic/metabolic? mitochondrial disorder,  [place]  w muscle biopsy    Tiredness     VSD (ventricular septal defect)     spontaneous closure      ____________________________________________  PAST SURGICAL HISTORY  Past Surgical History:   Procedure Laterality Date    HX ACL RECONSTRUCTION  2008    field hockey injury    HX DILATION AND EVACUATION      2016     HX LUMBAR FUSION  06/2017    L4L5 LUMBAR FUSION WITH LAMINECTOMY     HX ORTHOPAEDIC      ACL REPAIR    HX ORTHOPAEDIC  03/2021    RIGHT LABRAL HIP REPAIR     HX SKIN BIOPSY      MUSCLE BIOPSY X 2    NEUROLOGICAL PROCEDURE UNLISTED  03/2020    CERVICAL DISCKECTOMY  C3, C4, C5 AT [facility]    [place]  1/2010    [place]    MD MUSCLE BIOPSY  ~2007    [place]      ____________________________________________  HOME MEDICATIONS  Current Outpatient Medications   Medication Sig    cyclobenzaprine (FLEXERIL) 10 mg tablet Take 10 mg by mouth three (3) times daily as needed for Muscle Spasm(s).  morphine IR (MS IR) 30 mg tablet Take 30 mg by mouth every eight (8) hours as needed for Pain.  metoprolol succinate (TOPROL-XL) 25 mg XL tablet Take 25 mg by mouth daily.  OTHER TART CHERRY SUPPLEMENT 2 TABS DAILY    TURMERIC PO Take 2 Tablets by mouth daily.  OTHER MITOCHONDRIAL MAXIMIZER - 1 TAB BID    OTHER CBD AND THC - EDIBLES , USES THEM DAILY - PATIENT TOLD PER ANESTHESIA PROTOCOL , TO STOP 7 DAYS PRIOR TO SURGERY.  ALPRAZolam (Xanax XR) 1 mg XR tablet Take  by mouth every morning.  levOCARNitine Tartrate (L-CARNITINE, TARTRATE,) 500 mg cap Take 1,500 mg by mouth three (3) times daily.  PNV72-iron carb,glu-FA-dss-dha (CITRANATAL 90 DHA, ALGAL OIL,) 90 mg iron-1 mg -50 mg-300 mg cmpk daily.  gabapentin (NEURONTIN) 600 mg tablet Take 600 mg by mouth three (3) times daily.  DEXTROAMPHETAMINE/AMPHETAMINE (AMPHETAMINE SALT COMBO PO) Take 15 mg by mouth two (2) times a day.  folic acid (FOLVITE) 1 mg tablet Take 1 mg by mouth daily.  ALPRAZolam (XANAX) 0.5 mg tablet Take 1 mg by mouth two (2) times a day.  oxyCODONE IR (ROXICODONE) 10 mg tab immediate release tablet Take 10 mg by mouth every four (4) hours as needed. No current facility-administered medications for this encounter.      ____________________________________________  ALLERGIES  Allergies   Allergen Reactions    Ceclor Cd Hives    Ceclor [Cefaclor] Hives    Fish Containing Products Hives     Catfish only       ____________________________________________  SOCIAL HISTORY  Social History     Tobacco Use    Smoking status: Never Smoker    Smokeless tobacco: Never Used   Substance Use Topics    Alcohol use: No     Alcohol/week: 2.5 standard drinks     Types: 3 Standard drinks or equivalent per week      ____________________________________________   Internal Administration   First Dose COVID-19, Moderna, Primary or Immunocompromised Series, MRNA, PF, 100mcg/0.5mL  04/21/2021   Second Dose COVID-19, Herman Files, Primary or Immunocompromised Series, MRNA, PF, 100mcg/0.5mL  05/28/2021      Last COVID Lab SARS-CoV-2 ( )   Date Value   03/06/2021 Not Detected          Labs:     Hospital Outpatient Visit on 12/10/2021   Component Date Value Ref Range Status    HCG, Ql. 12/10/2021 Negative  NEG   Final    The serum pregnancy test becomes positive at about the time of implantation of the conceptus and approximates a quantitative bHCG value of >5 mIU/ML.     Sodium 12/10/2021 137  136 - 145 mmol/L Final    Potassium 12/10/2021 3.8  3.5 - 5.1 mmol/L Final    Chloride 12/10/2021 106  97 - 108 mmol/L Final    CO2 12/10/2021 26  21 - 32 mmol/L Final    Anion gap 12/10/2021 5  5 - 15 mmol/L Final    Glucose 12/10/2021 98  65 - 100 mg/dL Final    BUN 12/10/2021 7  6 - 20 MG/DL Final    Creatinine 12/10/2021 0.69  0.55 - 1.02 MG/DL Final    BUN/Creatinine ratio 12/10/2021 10* 12 - 20   Final    GFR est AA 12/10/2021 >60  >60 ml/min/1.73m2 Final    GFR est non-AA 12/10/2021 >60  >60 ml/min/1.73m2 Final    Estimated GFR is calculated using the IDMS-traceable Modification of Diet in Renal Disease (MDRD) Study equation, reported for both  Americans (GFRAA) and non- Americans (GFRNA), and normalized to 1.73m2 body surface area. The physician must decide which value applies to the patient.  Calcium 12/10/2021 9.7  8.5 - 10.1 MG/DL Final    WBC 12/10/2021 8.8  3.6 - 11.0 K/uL Final    RBC 12/10/2021 4.23  3.80 - 5.20 M/uL Final    HGB 12/10/2021 12.7  11.5 - 16.0 g/dL Final    HCT 12/10/2021 38.9  35.0 - 47.0 % Final    MCV 12/10/2021 92.0  80.0 - 99.0 FL Final    MCH 12/10/2021 30.0  26.0 - 34.0 PG Final    MCHC 12/10/2021 32.6  30.0 - 36.5 g/dL Final    RDW 12/10/2021 13.2  11.5 - 14.5 % Final    PLATELET 53/79/5114 300  150 - 400 K/uL Final    MPV 12/10/2021 10.8  8.9 - 12.9 FL Final    NRBC 12/10/2021 0.0  0  WBC Final    ABSOLUTE NRBC 12/10/2021 0.00  0.00 - 0.01 K/uL Final    Crossmatch Expiration 12/10/2021 12/24/2021,2359   Final    ABO/Rh(D) 12/10/2021 A POSITIVE   Final    Antibody screen 12/10/2021 NEG   Final    INR 12/10/2021 1.0  0.9 - 1.1   Final    A single therapeutic range for Vit K antagonists may not be optimal for all indications - see June, 2008 issue of Chest, American College of Chest Physicians Evidence-Based Clinical Practice Guidelines, 8th Edition.     Prothrombin time 12/10/2021 10.8  9.0 - 11.1 sec Final    Color 12/10/2021 YELLOW/STRAW    Final    Color Reference Range: Straw, Yellow or Dark Yellow    Appearance 12/10/2021 CLOUDY* CLEAR   Final    Specific gravity 12/10/2021 1.021  1.003 - 1.030   Final    pH (UA) 12/10/2021 5.0  5.0 - 8.0   Final    Protein 12/10/2021 Negative  NEG mg/dL Final    Glucose 12/10/2021 Negative  NEG mg/dL Final    Ketone 12/10/2021 15* NEG mg/dL Final    Bilirubin 12/10/2021 Negative  NEG   Final    Blood 12/10/2021 Negative NEG   Final    Urobilinogen 12/10/2021 0.2  0.2 - 1.0 EU/dL Final    Nitrites 12/10/2021 Negative  NEG   Final    Leukocyte Esterase 12/10/2021 Negative  NEG   Final    WBC 12/10/2021 0-4  0 - 4 /hpf Final    RBC 12/10/2021 0-5  0 - 5 /hpf Final    Epithelial cells 12/10/2021 MODERATE* FEW /lpf Final    Epithelial cell category consists of squamous cells and /or transitional urothelial cells. Renal tubular cells, if present, are separately identified as such.  Bacteria 12/10/2021 Negative  NEG /hpf Final    UA:UC IF INDICATED 12/10/2021 CULTURE NOT INDICATED BY UA RESULT  CNI   Final    Ventricular Rate 12/10/2021 94  BPM Final    Atrial Rate 12/10/2021 94  BPM Final    P-R Interval 12/10/2021 134  ms Final    QRS Duration 12/10/2021 88  ms Final    Q-T Interval 12/10/2021 360  ms Final    QTC Calculation (Bezet) 12/10/2021 450  ms Final    Calculated P Axis 12/10/2021 48  degrees Final    Calculated R Axis 12/10/2021 27  degrees Final    Calculated T Axis 12/10/2021 38  degrees Final    Diagnosis 12/10/2021    Final                    Value:Normal sinus rhythm  Possible Left atrial enlargement  Borderline ECG  No previous ECGs available  Confirmed by Kiera Boone MD. ([id number]) on 12/12/2021 11:30:58 PM      Hemoglobin A1c 12/10/2021 5.3  4.0 - 5.6 % Final    Comment: NEW METHOD  PLEASE NOTE NEW REFERENCE RANGE  (NOTE)  HbA1C Interpretive Ranges  <5.7              Normal  5.7 - 6.4         Consider Prediabetes  >6.5              Consider Diabetes      Est. average glucose 12/10/2021 105  mg/dL Final    Special Requests: 12/10/2021 NO SPECIAL REQUESTS    Final    Culture result: 12/10/2021 MRSA NOT PRESENT    Final       Skin:     Denies open wounds, cuts, sores, rashes or other areas of concern in PAT assessment.           Carmela Chandler NP

## 2021-12-15 NOTE — PERIOP NOTES
Left message on pt's voicemail asking her to return call re:  Did she go to cardiology  appt 12/14/21 for cardiac clearance?     Pt returned call and stated she has appt for cardiac clearance with Dr. Mio Norman on 12/23/21 at 9:40am

## 2021-12-21 ENCOUNTER — OFFICE VISIT (OUTPATIENT)
Dept: ORTHOPEDIC SURGERY | Age: 31
End: 2021-12-21
Payer: MEDICARE

## 2021-12-21 VITALS — WEIGHT: 192 LBS | HEIGHT: 64 IN | BODY MASS INDEX: 32.78 KG/M2

## 2021-12-21 DIAGNOSIS — G89.29 HIP PAIN, CHRONIC, RIGHT: ICD-10-CM

## 2021-12-21 DIAGNOSIS — M16.11 PRIMARY LOCALIZED OSTEOARTHRITIS OF RIGHT HIP: ICD-10-CM

## 2021-12-21 DIAGNOSIS — M25.551 HIP PAIN, CHRONIC, RIGHT: ICD-10-CM

## 2021-12-21 DIAGNOSIS — M25.551 ACUTE RIGHT HIP PAIN: Primary | ICD-10-CM

## 2021-12-21 PROCEDURE — G8756 NO BP MEASURE DOC: HCPCS | Performed by: ORTHOPAEDIC SURGERY

## 2021-12-21 PROCEDURE — 99214 OFFICE O/P EST MOD 30 MIN: CPT | Performed by: ORTHOPAEDIC SURGERY

## 2021-12-21 PROCEDURE — G8432 DEP SCR NOT DOC, RNG: HCPCS | Performed by: ORTHOPAEDIC SURGERY

## 2021-12-21 PROCEDURE — G8427 DOCREV CUR MEDS BY ELIG CLIN: HCPCS | Performed by: ORTHOPAEDIC SURGERY

## 2021-12-21 PROCEDURE — G8419 CALC BMI OUT NRM PARAM NOF/U: HCPCS | Performed by: ORTHOPAEDIC SURGERY

## 2021-12-21 NOTE — PROGRESS NOTES
Lavinia Remy (: 1990) is a 32 y.o. female, patient, here for evaluation of the following chief complaint(s):  Hip Pain (right)       HPI:    Increasing pain right hip and knee. She is concerned that she may have an issue with her right knee. Question of avascular necrosis of her distal femur. Pain is    Allergies   Allergen Reactions    Ceclor Cd Hives    Ceclor [Cefaclor] Hives    Fish Containing Products Hives     Catfish only        Current Outpatient Medications   Medication Sig    cyclobenzaprine (FLEXERIL) 10 mg tablet Take 10 mg by mouth three (3) times daily as needed for Muscle Spasm(s).  morphine IR (MS IR) 30 mg tablet Take 30 mg by mouth every eight (8) hours as needed for Pain.  metoprolol succinate (TOPROL-XL) 25 mg XL tablet Take 25 mg by mouth daily.  ALPRAZolam (Xanax XR) 1 mg XR tablet Take  by mouth every morning.  levOCARNitine Tartrate (L-CARNITINE, TARTRATE,) 500 mg cap Take 1,500 mg by mouth three (3) times daily.  gabapentin (NEURONTIN) 600 mg tablet Take 600 mg by mouth three (3) times daily.  DEXTROAMPHETAMINE/AMPHETAMINE (AMPHETAMINE SALT COMBO PO) Take 15 mg by mouth two (2) times a day.  folic acid (FOLVITE) 1 mg tablet Take 1 mg by mouth daily.  oxyCODONE IR (ROXICODONE) 10 mg tab immediate release tablet Take 10 mg by mouth every four (4) hours as needed.  OTHER TART CHERRY SUPPLEMENT 2 TABS DAILY (Patient not taking: Reported on 2021)    TURMERIC PO Take 2 Tablets by mouth daily. (Patient not taking: Reported on 2021)    OTHER MITOCHONDRIAL MAXIMIZER - 1 TAB BID (Patient not taking: Reported on 2021)    OTHER CBD AND THC - EDIBLES , USES THEM DAILY - PATIENT TOLD PER ANESTHESIA PROTOCOL , TO STOP 7 DAYS PRIOR TO SURGERY. (Patient not taking: Reported on 2021)    PNV72-iron carb,glu-FA-dss-dha (CITRANATAL 90 DHA, ALGAL OIL,) 90 mg iron-1 mg -50 mg-300 mg cmpk daily.  (Patient not taking: Reported on 2021)  ALPRAZolam (XANAX) 0.5 mg tablet Take 1 mg by mouth two (2) times a day. (Patient not taking: Reported on 12/21/2021)     No current facility-administered medications for this visit. Past Medical History:   Diagnosis Date    ADHD (attention deficit hyperactivity disorder) 2009    [facility] Ruel [facility]    Adverse effect of anesthesia     [address], NOTE ABOUT WHAT ANESTHESIA WORKS BEST FOR HER MITOCHONDRIAL DISEASE.  Arrhythmia     TACHYCARDIA , HISTORY OF AFIB - SEES DR. BONILLA      Arthritis     Carnitine deficiency ([facility] [address]     Chronic pain     from myositis, myopathy    Gm-Danlos syndrome     FH: rheumatoid arthritis 10/1/2013    Fibromyalgia     HSP (Henoch Schonlein purpura) ([facility] [address] 12/24/2014    occurs w myopathy flares    Muscle pain     Muscle weakness     Myopathy 2005    Dr. Debbi García, Dr. Monzon Horse, genetic/metabolic? mitochondrial disorder,  [facility] muscle biopsy    Tiredness     VSD (ventricular septal defect)     spontaneous closure        Past Surgical History:   Procedure Laterality Date    HX ACL RECONSTRUCTION  2008    field hockey injury    HX DILATION AND EVACUATION      2016     HX LUMBAR FUSION  06/2017    L4L5 LUMBAR FUSION WITH LAMINECTOMY     HX ORTHOPAEDIC      ACL REPAIR    HX ORTHOPAEDIC  03/2021    RIGHT LABRAL HIP REPAIR     HX SKIN BIOPSY      MUSCLE BIOPSY X 2    NEUROLOGICAL PROCEDURE UNLISTED  03/2020    CERVICAL DISCKECTOMY  C3, C4, C5 AT [facility]    [place] MUSCLE BIOPSY  1/2010    [place]    DC MUSCLE BIOPSY  ~2007    [place]       Family History   Problem Relation Age of Onset    Diabetes Father     Heart Disease Father     Diabetes Mother    Layla Maude Arthritis-rheumatoid Mother     Heart Disease Mother    Layla Maude Arthritis-rheumatoid Sister     Diabetes Paternal Grandmother     Diabetes Paternal Grandfather     Diabetes Maternal Grandmother     Breast Cancer Other         1 great aunts        Social History     Socioeconomic History    Marital status: LEGALLY      Spouse name: Not on file    Number of children: 0    Years of education: Not on file    Highest education level: Not on file   Occupational History    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 [place]   Tobacco Use    Smoking status: Never Smoker    Smokeless tobacco: Never Used   Substance and Sexual Activity    Alcohol use: No     Alcohol/week: 2.5 standard drinks     Types: 3 Standard drinks or equivalent per week    Drug use: No    Sexual activity: Not Currently   Other Topics Concern    Not on file   Social History Narrative    ** Merged History Encounter **          Social Determinants of Health     Financial Resource Strain:     Difficulty of Paying Living Expenses: Not on file   Food Insecurity:     Worried About Running Out of Food in the Last Year: Not on file    Abi of Food in the Last Year: Not on file   Transportation Needs:     Lack of Transportation (Medical): Not on file    Lack of Transportation (Non-Medical):  Not on file   Physical Activity:     Days of Exercise per Week: Not on file    Minutes of Exercise per Session: Not on file   Stress:     Feeling of Stress : Not on file   Social Connections:     Frequency of Communication with Friends and Family: Not on file    Frequency of Social Gatherings with Friends and Family: Not on file    Attends Anabaptist Services: Not on file    Active Member of [address] [place] [address] or Organizations: Not on file    Attends Club or Organization Meetings: Not on file    Marital Status: Not on file   Intimate Partner Violence:     Fear of Current or Ex-Partner: Not on file    Emotionally Abused: Not on file    Physically Abused: Not on file    Sexually Abused: Not on file   Housing Stability:     Unable to Pay for Housing in the Last Year: Not on file    Number of [place] in the Last Year: Not on file    Unstable Housing in the Last Year: Not on file             Vitals:  Ht 5' 4\" (1.626 m)   Wt 192 lb (87.1 kg)   BMI 32.96 kg/m²    Body mass index is 32.96 kg/m². PHYSICAL EXAM:  Patient walks with a single cane today. Significant pain in her hip with rotation. Logroll test and impingement tests are both positive. IMAGING:  XR Results (most recent):  Results from Appointment encounter on 12/21/21    XR HIP RT W OR WO PELV 2-3 VWS    Narrative  3 views of the right hip. Severe bone-on-bone of the right hip. Hip dysplasia with center edge angle about 20 degrees. ASSESSMENT/PLAN:  1. Acute right hip pain  -     XR HIP RT W OR WO PELV 2-3 VWS; Future  2. Primary localized osteoarthritis of right hip  3. Hip pain, chronic, right    We discussed continued conservative treatment measures versus total joint replacement. Symptoms have progressed despite conservative treatment measures outlined above and they desire to proceed with right total hip conversion. Patient has had symptoms for over 6 months. They have decline in their activities of daily living with inability to walk long distances. There has been progressive decline in function. Discussed risks, benefits, and alternatives in detail, as well as anticipated hospital stay and course of rehabilitation. They will see their primary care physician prior to surgery. All questions answered. An electronic signature was used to authenticate this note.   --Shyann London MD

## 2021-12-22 ENCOUNTER — HOSPITAL ENCOUNTER (OUTPATIENT)
Dept: PREADMISSION TESTING | Age: 31
Discharge: HOME | End: 2021-12-22
Attending: ORTHOPAEDIC SURGERY
Payer: MEDICARE

## 2021-12-22 DIAGNOSIS — Z01.812 PRE-PROCEDURE LAB EXAM: ICD-10-CM

## 2021-12-22 PROCEDURE — U0005 INFEC AGEN DETEC AMPLI PROBE: HCPCS

## 2021-12-23 ENCOUNTER — ANESTHESIA EVENT (OUTPATIENT)
Dept: SURGERY | Age: 31
End: 2021-12-23
Payer: MEDICARE

## 2021-12-23 LAB
SARS-COV-2, XPLCVT: NOT DETECTED
SOURCE, COVRS: NORMAL

## 2021-12-27 ENCOUNTER — ANESTHESIA (OUTPATIENT)
Dept: SURGERY | Age: 31
End: 2021-12-27
Payer: MEDICARE

## 2021-12-27 ENCOUNTER — APPOINTMENT (OUTPATIENT)
Dept: GENERAL RADIOLOGY | Age: 31
End: 2021-12-27
Attending: PHYSICIAN ASSISTANT
Payer: MEDICARE

## 2021-12-27 ENCOUNTER — HOSPITAL ENCOUNTER (OUTPATIENT)
Age: 31
Discharge: HOME HEALTH | End: 2021-12-29
Attending: ORTHOPAEDIC SURGERY | Admitting: ORTHOPAEDIC SURGERY
Payer: MEDICARE

## 2021-12-27 ENCOUNTER — APPOINTMENT (OUTPATIENT)
Dept: GENERAL RADIOLOGY | Age: 31
End: 2021-12-27
Attending: ORTHOPAEDIC SURGERY
Payer: MEDICARE

## 2021-12-27 DIAGNOSIS — M16.11 PRIMARY OSTEOARTHRITIS OF RIGHT HIP: ICD-10-CM

## 2021-12-27 DIAGNOSIS — M24.159 LABRAL TEAR OF HIP, DEGENERATIVE: ICD-10-CM

## 2021-12-27 LAB
ABO + RH BLD: NORMAL
BLOOD GROUP ANTIBODIES SERPL: NORMAL
GLUCOSE BLD STRIP.AUTO-MCNC: 99 MG/DL (ref 65–117)
HCG UR QL: NEGATIVE
SERVICE CMNT-IMP: NORMAL
SPECIMEN EXP DATE BLD: NORMAL

## 2021-12-27 PROCEDURE — 74011250636 HC RX REV CODE- 250/636: Performed by: ANESTHESIOLOGY

## 2021-12-27 PROCEDURE — 77030008684 HC TU ET CUF COVD -B: Performed by: ANESTHESIOLOGY

## 2021-12-27 PROCEDURE — 73501 X-RAY EXAM HIP UNI 1 VIEW: CPT

## 2021-12-27 PROCEDURE — 74011000250 HC RX REV CODE- 250: Performed by: NURSE ANESTHETIST, CERTIFIED REGISTERED

## 2021-12-27 PROCEDURE — C1713 ANCHOR/SCREW BN/BN,TIS/BN: HCPCS | Performed by: ORTHOPAEDIC SURGERY

## 2021-12-27 PROCEDURE — C1776 JOINT DEVICE (IMPLANTABLE): HCPCS | Performed by: ORTHOPAEDIC SURGERY

## 2021-12-27 PROCEDURE — 77030002933 HC SUT MCRYL J&J -A: Performed by: ORTHOPAEDIC SURGERY

## 2021-12-27 PROCEDURE — 74011250636 HC RX REV CODE- 250/636: Performed by: NURSE ANESTHETIST, CERTIFIED REGISTERED

## 2021-12-27 PROCEDURE — 27132 TOTAL HIP ARTHROPLASTY: CPT | Performed by: ORTHOPAEDIC SURGERY

## 2021-12-27 PROCEDURE — 77030038692 HC WND DEB SYS IRMX -B: Performed by: ORTHOPAEDIC SURGERY

## 2021-12-27 PROCEDURE — 81025 URINE PREGNANCY TEST: CPT

## 2021-12-27 PROCEDURE — C9290 INJ, BUPIVACAINE LIPOSOME: HCPCS | Performed by: ORTHOPAEDIC SURGERY

## 2021-12-27 PROCEDURE — 74011000250 HC RX REV CODE- 250: Performed by: ORTHOPAEDIC SURGERY

## 2021-12-27 PROCEDURE — 27132 TOTAL HIP ARTHROPLASTY: CPT | Performed by: PHYSICIAN ASSISTANT

## 2021-12-27 PROCEDURE — 74011250637 HC RX REV CODE- 250/637: Performed by: ANESTHESIOLOGY

## 2021-12-27 PROCEDURE — 74011250637 HC RX REV CODE- 250/637: Performed by: PHYSICIAN ASSISTANT

## 2021-12-27 PROCEDURE — 74011250636 HC RX REV CODE- 250/636: Performed by: ORTHOPAEDIC SURGERY

## 2021-12-27 PROCEDURE — 74011250637 HC RX REV CODE- 250/637: Performed by: NURSE ANESTHETIST, CERTIFIED REGISTERED

## 2021-12-27 PROCEDURE — 74011250636 HC RX REV CODE- 250/636

## 2021-12-27 PROCEDURE — 74011000250 HC RX REV CODE- 250

## 2021-12-27 PROCEDURE — 77030021678 HC GLIDESCP STAT DISP VERT -B: Performed by: ANESTHESIOLOGY

## 2021-12-27 PROCEDURE — 77030026438 HC STYL ET INTUB CARD -A: Performed by: ANESTHESIOLOGY

## 2021-12-27 PROCEDURE — 77030040922 HC BLNKT HYPOTHRM STRY -A

## 2021-12-27 PROCEDURE — 77030039267 HC ADH SKN EXOFIN S2SG -B: Performed by: ORTHOPAEDIC SURGERY

## 2021-12-27 PROCEDURE — 2709999900 HC NON-CHARGEABLE SUPPLY: Performed by: ORTHOPAEDIC SURGERY

## 2021-12-27 PROCEDURE — 82962 GLUCOSE BLOOD TEST: CPT

## 2021-12-27 PROCEDURE — 77030006822 HC BLD SAW SAG BRSM -B: Performed by: ORTHOPAEDIC SURGERY

## 2021-12-27 PROCEDURE — 74011000250 HC RX REV CODE- 250: Performed by: PHYSICIAN ASSISTANT

## 2021-12-27 PROCEDURE — 77030031139 HC SUT VCRL2 J&J -A: Performed by: ORTHOPAEDIC SURGERY

## 2021-12-27 PROCEDURE — 74011250636 HC RX REV CODE- 250/636: Performed by: PHYSICIAN ASSISTANT

## 2021-12-27 PROCEDURE — 77030020788: Performed by: ORTHOPAEDIC SURGERY

## 2021-12-27 PROCEDURE — 36415 COLL VENOUS BLD VENIPUNCTURE: CPT

## 2021-12-27 PROCEDURE — 76060000037 HC ANESTHESIA 3 TO 3.5 HR: Performed by: ORTHOPAEDIC SURGERY

## 2021-12-27 PROCEDURE — 86900 BLOOD TYPING SEROLOGIC ABO: CPT

## 2021-12-27 PROCEDURE — 74011250637 HC RX REV CODE- 250/637: Performed by: ORTHOPAEDIC SURGERY

## 2021-12-27 PROCEDURE — 76210000006 HC OR PH I REC 0.5 TO 1 HR: Performed by: ORTHOPAEDIC SURGERY

## 2021-12-27 PROCEDURE — 76010000172 HC OR TIME 2.5 TO 3 HR INTENSV-TIER 1: Performed by: ORTHOPAEDIC SURGERY

## 2021-12-27 DEVICE — IMPLANTABLE DEVICE: Type: IMPLANTABLE DEVICE | Site: HIP | Status: FUNCTIONAL

## 2021-12-27 DEVICE — G7 DUAL MOBILITY LINER 38MM C: Type: IMPLANTABLE DEVICE | Site: HIP | Status: FUNCTIONAL

## 2021-12-27 DEVICE — BEARING TIB 28X38 MM HIP VIVACIT-E: Type: IMPLANTABLE DEVICE | Site: HIP | Status: FUNCTIONAL

## 2021-12-27 DEVICE — SCR ACET CANC PINN 6.5X15MM SS --: Type: IMPLANTABLE DEVICE | Site: HIP | Status: FUNCTIONAL

## 2021-12-27 DEVICE — HEAD FEM DIA28MM +8.5MM OFFSET 12/14 TAPR HIP CERAMIC: Type: IMPLANTABLE DEVICE | Site: HIP | Status: FUNCTIONAL

## 2021-12-27 DEVICE — SCREW BNE L20MM DIA6.5MM CANC HIP S STL GRIPTION FULL THRD: Type: IMPLANTABLE DEVICE | Site: HIP | Status: FUNCTIONAL

## 2021-12-27 DEVICE — STEM FEM SZ 1 HI OFFSET CLLRD HIP ARTC EZ 12/14 TAPR ACTIS: Type: IMPLANTABLE DEVICE | Site: HIP | Status: FUNCTIONAL

## 2021-12-27 RX ORDER — ALPRAZOLAM 1 MG/1
1 TABLET ORAL 2 TIMES DAILY
Status: DISCONTINUED | OUTPATIENT
Start: 2021-12-27 | End: 2021-12-27

## 2021-12-27 RX ORDER — DEXTROAMPHETAMINE SACCHARATE, AMPHETAMINE ASPARTATE, DEXTROAMPHETAMINE SULFATE AND AMPHETAMINE SULFATE 2.5; 2.5; 2.5; 2.5 MG/1; MG/1; MG/1; MG/1
30 TABLET ORAL DAILY
Status: DISCONTINUED | OUTPATIENT
Start: 2021-12-28 | End: 2021-12-29 | Stop reason: HOSPADM

## 2021-12-27 RX ORDER — NEOSTIGMINE METHYLSULFATE 1 MG/ML
INJECTION, SOLUTION INTRAVENOUS AS NEEDED
Status: DISCONTINUED | OUTPATIENT
Start: 2021-12-27 | End: 2021-12-27 | Stop reason: HOSPADM

## 2021-12-27 RX ORDER — DIPHENHYDRAMINE HYDROCHLORIDE 50 MG/ML
12.5 INJECTION, SOLUTION INTRAMUSCULAR; INTRAVENOUS
Status: ACTIVE | OUTPATIENT
Start: 2021-12-27 | End: 2021-12-28

## 2021-12-27 RX ORDER — FENTANYL CITRATE 50 UG/ML
50 INJECTION, SOLUTION INTRAMUSCULAR; INTRAVENOUS
Status: DISCONTINUED | OUTPATIENT
Start: 2021-12-27 | End: 2021-12-27 | Stop reason: HOSPADM

## 2021-12-27 RX ORDER — ACETAMINOPHEN 500 MG
1000 TABLET ORAL EVERY 6 HOURS
Status: DISCONTINUED | OUTPATIENT
Start: 2021-12-28 | End: 2021-12-29 | Stop reason: HOSPADM

## 2021-12-27 RX ORDER — DEXAMETHASONE SODIUM PHOSPHATE 4 MG/ML
INJECTION, SOLUTION INTRA-ARTICULAR; INTRALESIONAL; INTRAMUSCULAR; INTRAVENOUS; SOFT TISSUE AS NEEDED
Status: DISCONTINUED | OUTPATIENT
Start: 2021-12-27 | End: 2021-12-27 | Stop reason: HOSPADM

## 2021-12-27 RX ORDER — ONDANSETRON 2 MG/ML
4 INJECTION INTRAMUSCULAR; INTRAVENOUS AS NEEDED
Status: DISCONTINUED | OUTPATIENT
Start: 2021-12-27 | End: 2021-12-27 | Stop reason: HOSPADM

## 2021-12-27 RX ORDER — MIDAZOLAM HYDROCHLORIDE 1 MG/ML
INJECTION, SOLUTION INTRAMUSCULAR; INTRAVENOUS
Status: DISPENSED
Start: 2021-12-27 | End: 2021-12-28

## 2021-12-27 RX ORDER — PROPOFOL 10 MG/ML
INJECTION, EMULSION INTRAVENOUS AS NEEDED
Status: DISCONTINUED | OUTPATIENT
Start: 2021-12-27 | End: 2021-12-27 | Stop reason: HOSPADM

## 2021-12-27 RX ORDER — MORPHINE SULFATE IN 0.9 % NACL 150MG/30ML
PATIENT CONTROLLED ANALGESIA SYRINGE INTRAVENOUS
Status: COMPLETED
Start: 2021-12-27 | End: 2021-12-27

## 2021-12-27 RX ORDER — BUPIVACAINE HYDROCHLORIDE 5 MG/ML
INJECTION, SOLUTION EPIDURAL; INTRACAUDAL AS NEEDED
Status: DISCONTINUED | OUTPATIENT
Start: 2021-12-27 | End: 2021-12-27 | Stop reason: HOSPADM

## 2021-12-27 RX ORDER — SODIUM CHLORIDE, SODIUM LACTATE, POTASSIUM CHLORIDE, CALCIUM CHLORIDE 600; 310; 30; 20 MG/100ML; MG/100ML; MG/100ML; MG/100ML
50 INJECTION, SOLUTION INTRAVENOUS CONTINUOUS
Status: DISCONTINUED | OUTPATIENT
Start: 2021-12-27 | End: 2021-12-27 | Stop reason: HOSPADM

## 2021-12-27 RX ORDER — CYCLOBENZAPRINE HCL 10 MG
10 TABLET ORAL
Status: DISCONTINUED | OUTPATIENT
Start: 2021-12-27 | End: 2021-12-29 | Stop reason: HOSPADM

## 2021-12-27 RX ORDER — LIDOCAINE HYDROCHLORIDE 10 MG/ML
0.1 INJECTION, SOLUTION EPIDURAL; INFILTRATION; INTRACAUDAL; PERINEURAL AS NEEDED
Status: DISCONTINUED | OUTPATIENT
Start: 2021-12-27 | End: 2021-12-27 | Stop reason: HOSPADM

## 2021-12-27 RX ORDER — SODIUM CHLORIDE 0.9 % (FLUSH) 0.9 %
5-40 SYRINGE (ML) INJECTION EVERY 8 HOURS
Status: DISCONTINUED | OUTPATIENT
Start: 2021-12-27 | End: 2021-12-29 | Stop reason: HOSPADM

## 2021-12-27 RX ORDER — KETAMINE HYDROCHLORIDE 10 MG/ML
INJECTION, SOLUTION INTRAMUSCULAR; INTRAVENOUS AS NEEDED
Status: DISCONTINUED | OUTPATIENT
Start: 2021-12-27 | End: 2021-12-27 | Stop reason: HOSPADM

## 2021-12-27 RX ORDER — FENTANYL CITRATE 50 UG/ML
INJECTION, SOLUTION INTRAMUSCULAR; INTRAVENOUS AS NEEDED
Status: DISCONTINUED | OUTPATIENT
Start: 2021-12-27 | End: 2021-12-27 | Stop reason: HOSPADM

## 2021-12-27 RX ORDER — SODIUM CHLORIDE 0.9 % (FLUSH) 0.9 %
5-40 SYRINGE (ML) INJECTION EVERY 8 HOURS
Status: DISCONTINUED | OUTPATIENT
Start: 2021-12-27 | End: 2021-12-27 | Stop reason: HOSPADM

## 2021-12-27 RX ORDER — FENTANYL CITRATE 50 UG/ML
INJECTION, SOLUTION INTRAMUSCULAR; INTRAVENOUS
Status: DISPENSED
Start: 2021-12-27 | End: 2021-12-28

## 2021-12-27 RX ORDER — GABAPENTIN 600 MG/1
600 TABLET ORAL 3 TIMES DAILY
Status: DISCONTINUED | OUTPATIENT
Start: 2021-12-27 | End: 2021-12-29 | Stop reason: HOSPADM

## 2021-12-27 RX ORDER — SODIUM CHLORIDE 0.9 % (FLUSH) 0.9 %
5-40 SYRINGE (ML) INJECTION AS NEEDED
Status: DISCONTINUED | OUTPATIENT
Start: 2021-12-27 | End: 2021-12-29 | Stop reason: HOSPADM

## 2021-12-27 RX ORDER — LORAZEPAM 2 MG/ML
2 INJECTION INTRAMUSCULAR ONCE
Status: COMPLETED | OUTPATIENT
Start: 2021-12-27 | End: 2021-12-27

## 2021-12-27 RX ORDER — ALPRAZOLAM 1 MG/1
1 TABLET ORAL DAILY
Status: DISCONTINUED | OUTPATIENT
Start: 2021-12-28 | End: 2021-12-27 | Stop reason: SDUPTHER

## 2021-12-27 RX ORDER — TRANEXAMIC ACID 100 MG/ML
INJECTION, SOLUTION INTRAVENOUS AS NEEDED
Status: DISCONTINUED | OUTPATIENT
Start: 2021-12-27 | End: 2021-12-27 | Stop reason: HOSPADM

## 2021-12-27 RX ORDER — FENTANYL CITRATE 50 UG/ML
25 INJECTION, SOLUTION INTRAMUSCULAR; INTRAVENOUS
Status: COMPLETED | OUTPATIENT
Start: 2021-12-27 | End: 2021-12-27

## 2021-12-27 RX ORDER — HYDROMORPHONE HYDROCHLORIDE 1 MG/ML
0.5 INJECTION, SOLUTION INTRAMUSCULAR; INTRAVENOUS; SUBCUTANEOUS
Status: DISCONTINUED | OUTPATIENT
Start: 2021-12-27 | End: 2021-12-28

## 2021-12-27 RX ORDER — NALOXONE HYDROCHLORIDE 0.4 MG/ML
0.4 INJECTION, SOLUTION INTRAMUSCULAR; INTRAVENOUS; SUBCUTANEOUS AS NEEDED
Status: DISCONTINUED | OUTPATIENT
Start: 2021-12-27 | End: 2021-12-29 | Stop reason: HOSPADM

## 2021-12-27 RX ORDER — LORAZEPAM 2 MG/ML
INJECTION INTRAMUSCULAR
Status: DISPENSED
Start: 2021-12-27 | End: 2021-12-28

## 2021-12-27 RX ORDER — MIDAZOLAM HYDROCHLORIDE 1 MG/ML
2 INJECTION, SOLUTION INTRAMUSCULAR; INTRAVENOUS ONCE
Status: COMPLETED | OUTPATIENT
Start: 2021-12-27 | End: 2021-12-27

## 2021-12-27 RX ORDER — ASPIRIN 325 MG
325 TABLET, DELAYED RELEASE (ENTERIC COATED) ORAL 2 TIMES DAILY
Status: DISCONTINUED | OUTPATIENT
Start: 2021-12-27 | End: 2021-12-29 | Stop reason: HOSPADM

## 2021-12-27 RX ORDER — SODIUM CHLORIDE, SODIUM LACTATE, POTASSIUM CHLORIDE, CALCIUM CHLORIDE 600; 310; 30; 20 MG/100ML; MG/100ML; MG/100ML; MG/100ML
INJECTION, SOLUTION INTRAVENOUS
Status: DISCONTINUED | OUTPATIENT
Start: 2021-12-27 | End: 2021-12-27 | Stop reason: HOSPADM

## 2021-12-27 RX ORDER — MORPHINE SULFATE 15 MG/1
30 TABLET ORAL EVERY 8 HOURS
Status: DISCONTINUED | OUTPATIENT
Start: 2021-12-27 | End: 2021-12-28

## 2021-12-27 RX ORDER — MORPHINE SULFATE 2 MG/ML
2 INJECTION, SOLUTION INTRAMUSCULAR; INTRAVENOUS
Status: DISCONTINUED | OUTPATIENT
Start: 2021-12-27 | End: 2021-12-27 | Stop reason: HOSPADM

## 2021-12-27 RX ORDER — ROCURONIUM BROMIDE 10 MG/ML
INJECTION, SOLUTION INTRAVENOUS AS NEEDED
Status: DISCONTINUED | OUTPATIENT
Start: 2021-12-27 | End: 2021-12-27 | Stop reason: HOSPADM

## 2021-12-27 RX ORDER — SODIUM CHLORIDE 0.9 % (FLUSH) 0.9 %
5-40 SYRINGE (ML) INJECTION AS NEEDED
Status: DISCONTINUED | OUTPATIENT
Start: 2021-12-27 | End: 2021-12-27 | Stop reason: HOSPADM

## 2021-12-27 RX ORDER — HYDROMORPHONE HYDROCHLORIDE 1 MG/ML
0.2 INJECTION, SOLUTION INTRAMUSCULAR; INTRAVENOUS; SUBCUTANEOUS
Status: DISCONTINUED | OUTPATIENT
Start: 2021-12-27 | End: 2021-12-27 | Stop reason: HOSPADM

## 2021-12-27 RX ORDER — ALPRAZOLAM 1 MG/1
1 TABLET ORAL 2 TIMES DAILY
Status: DISCONTINUED | OUTPATIENT
Start: 2021-12-28 | End: 2021-12-28

## 2021-12-27 RX ORDER — HYDROMORPHONE HYDROCHLORIDE 2 MG/ML
INJECTION, SOLUTION INTRAMUSCULAR; INTRAVENOUS; SUBCUTANEOUS AS NEEDED
Status: DISCONTINUED | OUTPATIENT
Start: 2021-12-27 | End: 2021-12-27 | Stop reason: HOSPADM

## 2021-12-27 RX ORDER — MIDAZOLAM HYDROCHLORIDE 1 MG/ML
2 INJECTION, SOLUTION INTRAMUSCULAR; INTRAVENOUS ONCE
Status: DISCONTINUED | OUTPATIENT
Start: 2021-12-27 | End: 2021-12-27 | Stop reason: HOSPADM

## 2021-12-27 RX ORDER — OXYCODONE HYDROCHLORIDE 5 MG/1
10 TABLET ORAL
Status: DISCONTINUED | OUTPATIENT
Start: 2021-12-27 | End: 2021-12-28

## 2021-12-27 RX ORDER — LIDOCAINE HYDROCHLORIDE 20 MG/ML
INJECTION, SOLUTION EPIDURAL; INFILTRATION; INTRACAUDAL; PERINEURAL AS NEEDED
Status: DISCONTINUED | OUTPATIENT
Start: 2021-12-27 | End: 2021-12-27 | Stop reason: HOSPADM

## 2021-12-27 RX ORDER — FACIAL-BODY WIPES
10 EACH TOPICAL DAILY PRN
Status: DISCONTINUED | OUTPATIENT
Start: 2021-12-29 | End: 2021-12-29 | Stop reason: HOSPADM

## 2021-12-27 RX ORDER — SCOLOPAMINE TRANSDERMAL SYSTEM 1 MG/1
PATCH, EXTENDED RELEASE TRANSDERMAL AS NEEDED
Status: DISCONTINUED | OUTPATIENT
Start: 2021-12-27 | End: 2021-12-27 | Stop reason: HOSPADM

## 2021-12-27 RX ORDER — MORPHINE SULFATE IN 0.9 % NACL 150MG/30ML
PATIENT CONTROLLED ANALGESIA SYRINGE INTRAVENOUS
Status: DISCONTINUED | OUTPATIENT
Start: 2021-12-27 | End: 2021-12-28

## 2021-12-27 RX ORDER — DEXMEDETOMIDINE HYDROCHLORIDE 100 UG/ML
INJECTION, SOLUTION INTRAVENOUS AS NEEDED
Status: DISCONTINUED | OUTPATIENT
Start: 2021-12-27 | End: 2021-12-27 | Stop reason: HOSPADM

## 2021-12-27 RX ORDER — SODIUM CHLORIDE 9 MG/ML
INJECTION, SOLUTION INTRAVENOUS
Status: DISCONTINUED | OUTPATIENT
Start: 2021-12-27 | End: 2021-12-27 | Stop reason: HOSPADM

## 2021-12-27 RX ORDER — LABETALOL HCL 20 MG/4 ML
SYRINGE (ML) INTRAVENOUS AS NEEDED
Status: DISCONTINUED | OUTPATIENT
Start: 2021-12-27 | End: 2021-12-27 | Stop reason: HOSPADM

## 2021-12-27 RX ORDER — ONDANSETRON 4 MG/1
4 TABLET, ORALLY DISINTEGRATING ORAL
Status: DISCONTINUED | OUTPATIENT
Start: 2021-12-27 | End: 2021-12-29 | Stop reason: HOSPADM

## 2021-12-27 RX ORDER — METOPROLOL SUCCINATE 25 MG/1
25 TABLET, EXTENDED RELEASE ORAL DAILY
Status: DISCONTINUED | OUTPATIENT
Start: 2021-12-28 | End: 2021-12-29 | Stop reason: HOSPADM

## 2021-12-27 RX ORDER — SODIUM CHLORIDE 9 MG/ML
125 INJECTION, SOLUTION INTRAVENOUS CONTINUOUS
Status: DISPENSED | OUTPATIENT
Start: 2021-12-27 | End: 2021-12-28

## 2021-12-27 RX ORDER — POLYETHYLENE GLYCOL 3350 17 G/17G
17 POWDER, FOR SOLUTION ORAL DAILY
Status: DISCONTINUED | OUTPATIENT
Start: 2021-12-28 | End: 2021-12-29 | Stop reason: HOSPADM

## 2021-12-27 RX ORDER — DIAZEPAM 10 MG/2ML
5 INJECTION INTRAMUSCULAR
Status: DISCONTINUED | OUTPATIENT
Start: 2021-12-27 | End: 2021-12-27 | Stop reason: HOSPADM

## 2021-12-27 RX ORDER — ONDANSETRON 2 MG/ML
4 INJECTION INTRAMUSCULAR; INTRAVENOUS
Status: DISPENSED | OUTPATIENT
Start: 2021-12-27 | End: 2021-12-28

## 2021-12-27 RX ORDER — GLYCOPYRROLATE 0.2 MG/ML
INJECTION INTRAMUSCULAR; INTRAVENOUS AS NEEDED
Status: DISCONTINUED | OUTPATIENT
Start: 2021-12-27 | End: 2021-12-27 | Stop reason: HOSPADM

## 2021-12-27 RX ORDER — AMOXICILLIN 250 MG
1 CAPSULE ORAL 2 TIMES DAILY
Status: DISCONTINUED | OUTPATIENT
Start: 2021-12-27 | End: 2021-12-29 | Stop reason: HOSPADM

## 2021-12-27 RX ORDER — ACETAMINOPHEN 500 MG
1000 TABLET ORAL ONCE
Status: COMPLETED | OUTPATIENT
Start: 2021-12-27 | End: 2021-12-27

## 2021-12-27 RX ORDER — OXYCODONE HYDROCHLORIDE 5 MG/1
5 TABLET ORAL
Status: DISCONTINUED | OUTPATIENT
Start: 2021-12-27 | End: 2021-12-28

## 2021-12-27 RX ORDER — ONDANSETRON 2 MG/ML
INJECTION INTRAMUSCULAR; INTRAVENOUS AS NEEDED
Status: DISCONTINUED | OUTPATIENT
Start: 2021-12-27 | End: 2021-12-27 | Stop reason: HOSPADM

## 2021-12-27 RX ORDER — CELECOXIB 200 MG/1
200 CAPSULE ORAL 2 TIMES DAILY
Status: DISCONTINUED | OUTPATIENT
Start: 2021-12-27 | End: 2021-12-28

## 2021-12-27 RX ORDER — MIDAZOLAM HYDROCHLORIDE 1 MG/ML
INJECTION, SOLUTION INTRAMUSCULAR; INTRAVENOUS AS NEEDED
Status: DISCONTINUED | OUTPATIENT
Start: 2021-12-27 | End: 2021-12-27 | Stop reason: HOSPADM

## 2021-12-27 RX ORDER — ALPRAZOLAM 1 MG/1
1 TABLET ORAL ONCE
Status: COMPLETED | OUTPATIENT
Start: 2021-12-27 | End: 2021-12-27

## 2021-12-27 RX ADMIN — PROPOFOL 30 MG: 10 INJECTION, EMULSION INTRAVENOUS at 17:34

## 2021-12-27 RX ADMIN — MORPHINE SULFATE 2 MG: 2 INJECTION, SOLUTION INTRAMUSCULAR; INTRAVENOUS at 19:53

## 2021-12-27 RX ADMIN — Medication 40 MG: at 15:12

## 2021-12-27 RX ADMIN — HYDROMORPHONE HYDROCHLORIDE 0.2 MG: 1 INJECTION, SOLUTION INTRAMUSCULAR; INTRAVENOUS; SUBCUTANEOUS at 19:45

## 2021-12-27 RX ADMIN — Medication: at 20:14

## 2021-12-27 RX ADMIN — DEXMEDETOMIDINE HYDROCHLORIDE 15 MCG: 100 INJECTION, SOLUTION, CONCENTRATE INTRAVENOUS at 15:23

## 2021-12-27 RX ADMIN — PROPOFOL 125 MCG/KG/MIN: 10 INJECTION, EMULSION INTRAVENOUS at 15:12

## 2021-12-27 RX ADMIN — FENTANYL CITRATE 100 MCG: 50 INJECTION, SOLUTION INTRAMUSCULAR; INTRAVENOUS at 15:12

## 2021-12-27 RX ADMIN — FENTANYL CITRATE 25 MCG: 50 INJECTION INTRAMUSCULAR; INTRAVENOUS at 19:00

## 2021-12-27 RX ADMIN — MEPERIDINE HYDROCHLORIDE 25 MG: 25 INJECTION INTRAMUSCULAR; INTRAVENOUS; SUBCUTANEOUS at 19:38

## 2021-12-27 RX ADMIN — GLYCOPYRROLATE 0.4 MG: 0.2 INJECTION, SOLUTION INTRAMUSCULAR; INTRAVENOUS at 17:25

## 2021-12-27 RX ADMIN — PROPOFOL 40 MG: 10 INJECTION, EMULSION INTRAVENOUS at 17:31

## 2021-12-27 RX ADMIN — TRANEXAMIC ACID 1 G: 100 INJECTION, SOLUTION INTRAVENOUS at 15:25

## 2021-12-27 RX ADMIN — ACETAMINOPHEN 1000 MG: 500 TABLET ORAL at 13:30

## 2021-12-27 RX ADMIN — MORPHINE SULFATE 2 MG: 2 INJECTION, SOLUTION INTRAMUSCULAR; INTRAVENOUS at 19:33

## 2021-12-27 RX ADMIN — FENTANYL CITRATE 50 MCG: 50 INJECTION INTRAMUSCULAR; INTRAVENOUS at 13:24

## 2021-12-27 RX ADMIN — MORPHINE SULFATE 2 MG: 2 INJECTION, SOLUTION INTRAMUSCULAR; INTRAVENOUS at 19:23

## 2021-12-27 RX ADMIN — FENTANYL CITRATE 50 MCG: 50 INJECTION INTRAMUSCULAR; INTRAVENOUS at 13:30

## 2021-12-27 RX ADMIN — CEFAZOLIN SODIUM 2 G: 1 INJECTION, POWDER, FOR SOLUTION INTRAMUSCULAR; INTRAVENOUS at 22:51

## 2021-12-27 RX ADMIN — LORAZEPAM 2 MG: 2 INJECTION INTRAMUSCULAR; INTRAVENOUS at 20:16

## 2021-12-27 RX ADMIN — MORPHINE SULFATE 2 MG: 2 INJECTION, SOLUTION INTRAMUSCULAR; INTRAVENOUS at 19:18

## 2021-12-27 RX ADMIN — HYDROMORPHONE HYDROCHLORIDE 0.5 MG: 2 INJECTION, SOLUTION INTRAMUSCULAR; INTRAVENOUS; SUBCUTANEOUS at 15:48

## 2021-12-27 RX ADMIN — ROCURONIUM BROMIDE 10 MG: 10 SOLUTION INTRAVENOUS at 16:37

## 2021-12-27 RX ADMIN — HYDROMORPHONE HYDROCHLORIDE 1 MG: 2 INJECTION, SOLUTION INTRAMUSCULAR; INTRAVENOUS; SUBCUTANEOUS at 18:04

## 2021-12-27 RX ADMIN — FENTANYL CITRATE 50 MCG: 50 INJECTION INTRAMUSCULAR; INTRAVENOUS at 14:38

## 2021-12-27 RX ADMIN — ONDANSETRON HYDROCHLORIDE 4 MG: 2 INJECTION, SOLUTION INTRAMUSCULAR; INTRAVENOUS at 17:06

## 2021-12-27 RX ADMIN — MORPHINE SULFATE 2 MG: 2 INJECTION, SOLUTION INTRAMUSCULAR; INTRAVENOUS at 19:08

## 2021-12-27 RX ADMIN — MIDAZOLAM 2 MG: 1 INJECTION INTRAMUSCULAR; INTRAVENOUS at 15:00

## 2021-12-27 RX ADMIN — PROPOFOL 150 MCG/KG/MIN: 10 INJECTION, EMULSION INTRAVENOUS at 15:20

## 2021-12-27 RX ADMIN — FENTANYL CITRATE 50 MCG: 50 INJECTION INTRAMUSCULAR; INTRAVENOUS at 20:30

## 2021-12-27 RX ADMIN — DOCUSATE SODIUM 50 MG AND SENNOSIDES 8.6 MG 1 TABLET: 8.6; 5 TABLET, FILM COATED ORAL at 22:51

## 2021-12-27 RX ADMIN — HYDROMORPHONE HYDROCHLORIDE 0.5 MG: 2 INJECTION, SOLUTION INTRAMUSCULAR; INTRAVENOUS; SUBCUTANEOUS at 17:50

## 2021-12-27 RX ADMIN — MORPHINE SULFATE 2 MG: 2 INJECTION, SOLUTION INTRAMUSCULAR; INTRAVENOUS at 19:48

## 2021-12-27 RX ADMIN — DEXMEDETOMIDINE HYDROCHLORIDE 10 MCG: 100 INJECTION, SOLUTION, CONCENTRATE INTRAVENOUS at 17:34

## 2021-12-27 RX ADMIN — FENTANYL CITRATE 25 MCG: 50 INJECTION INTRAMUSCULAR; INTRAVENOUS at 18:55

## 2021-12-27 RX ADMIN — PROPOFOL 170 MG: 10 INJECTION, EMULSION INTRAVENOUS at 15:12

## 2021-12-27 RX ADMIN — FENTANYL CITRATE 25 MCG: 50 INJECTION INTRAMUSCULAR; INTRAVENOUS at 18:50

## 2021-12-27 RX ADMIN — SCOPALAMINE 1 PATCH: 1 PATCH, EXTENDED RELEASE TRANSDERMAL at 15:00

## 2021-12-27 RX ADMIN — SODIUM CHLORIDE 125 ML/HR: 9 INJECTION, SOLUTION INTRAVENOUS at 19:49

## 2021-12-27 RX ADMIN — ROCURONIUM BROMIDE 20 MG: 10 SOLUTION INTRAVENOUS at 15:50

## 2021-12-27 RX ADMIN — WATER 2 G: 1 INJECTION INTRAMUSCULAR; INTRAVENOUS; SUBCUTANEOUS at 15:25

## 2021-12-27 RX ADMIN — ONDANSETRON HYDROCHLORIDE 4 MG: 2 SOLUTION INTRAMUSCULAR; INTRAVENOUS at 18:46

## 2021-12-27 RX ADMIN — DEXAMETHASONE SODIUM PHOSPHATE 8 MG: 4 INJECTION, SOLUTION INTRAMUSCULAR; INTRAVENOUS at 15:20

## 2021-12-27 RX ADMIN — ALPRAZOLAM 1 MG: 1 TABLET ORAL at 22:50

## 2021-12-27 RX ADMIN — HYDROMORPHONE HYDROCHLORIDE 0.2 MG: 1 INJECTION, SOLUTION INTRAMUSCULAR; INTRAVENOUS; SUBCUTANEOUS at 19:30

## 2021-12-27 RX ADMIN — FENTANYL CITRATE 25 MCG: 50 INJECTION INTRAMUSCULAR; INTRAVENOUS at 18:45

## 2021-12-27 RX ADMIN — HYDROMORPHONE HYDROCHLORIDE 0.2 MG: 1 INJECTION, SOLUTION INTRAMUSCULAR; INTRAVENOUS; SUBCUTANEOUS at 19:15

## 2021-12-27 RX ADMIN — SODIUM CHLORIDE, POTASSIUM CHLORIDE, SODIUM LACTATE AND CALCIUM CHLORIDE: 600; 310; 30; 20 INJECTION, SOLUTION INTRAVENOUS at 15:00

## 2021-12-27 RX ADMIN — ASPIRIN 325 MG: 325 TABLET, COATED ORAL at 22:50

## 2021-12-27 RX ADMIN — LABETALOL 20 MG/4 ML (5 MG/ML) INTRAVENOUS SYRINGE 5 MG: at 16:49

## 2021-12-27 RX ADMIN — DIAZEPAM 5 MG: 5 INJECTION, SOLUTION INTRAMUSCULAR; INTRAVENOUS at 18:45

## 2021-12-27 RX ADMIN — GABAPENTIN 600 MG: 600 TABLET, FILM COATED ORAL at 22:50

## 2021-12-27 RX ADMIN — ROCURONIUM BROMIDE 50 MG: 10 SOLUTION INTRAVENOUS at 15:12

## 2021-12-27 RX ADMIN — LIDOCAINE HYDROCHLORIDE 100 MG: 20 INJECTION, SOLUTION EPIDURAL; INFILTRATION; INTRACAUDAL; PERINEURAL at 15:12

## 2021-12-27 RX ADMIN — Medication 3 MG: at 17:25

## 2021-12-27 RX ADMIN — Medication 10 MG: at 16:14

## 2021-12-27 RX ADMIN — HYDROMORPHONE HYDROCHLORIDE 0.2 MG: 1 INJECTION, SOLUTION INTRAMUSCULAR; INTRAVENOUS; SUBCUTANEOUS at 19:00

## 2021-12-27 RX ADMIN — SODIUM CHLORIDE: 900 INJECTION, SOLUTION INTRAVENOUS at 15:38

## 2021-12-27 RX ADMIN — HYDROMORPHONE HYDROCHLORIDE 1 MG: 2 INJECTION, SOLUTION INTRAMUSCULAR; INTRAVENOUS; SUBCUTANEOUS at 15:00

## 2021-12-27 RX ADMIN — MORPHINE SULFATE 2 MG: 2 INJECTION, SOLUTION INTRAMUSCULAR; INTRAVENOUS at 19:13

## 2021-12-27 RX ADMIN — DEXMEDETOMIDINE HYDROCHLORIDE 10 MCG: 100 INJECTION, SOLUTION, CONCENTRATE INTRAVENOUS at 18:04

## 2021-12-27 RX ADMIN — PROPOFOL 30 MG: 10 INJECTION, EMULSION INTRAVENOUS at 17:46

## 2021-12-27 RX ADMIN — OXYCODONE HYDROCHLORIDE 10 MG: 5 TABLET ORAL at 22:50

## 2021-12-27 RX ADMIN — SODIUM CHLORIDE 125 ML/HR: 9 INJECTION, SOLUTION INTRAVENOUS at 18:00

## 2021-12-27 RX ADMIN — SODIUM CHLORIDE, POTASSIUM CHLORIDE, SODIUM LACTATE AND CALCIUM CHLORIDE 50 ML/HR: 600; 310; 30; 20 INJECTION, SOLUTION INTRAVENOUS at 13:22

## 2021-12-27 RX ADMIN — MIDAZOLAM 2 MG: 1 INJECTION INTRAMUSCULAR; INTRAVENOUS at 13:24

## 2021-12-27 RX ADMIN — MORPHINE SULFATE 2 MG: 2 INJECTION, SOLUTION INTRAMUSCULAR; INTRAVENOUS at 19:38

## 2021-12-27 RX ADMIN — HYDROMORPHONE HYDROCHLORIDE 0.5 MG: 2 INJECTION, SOLUTION INTRAMUSCULAR; INTRAVENOUS; SUBCUTANEOUS at 16:13

## 2021-12-27 RX ADMIN — CELECOXIB 200 MG: 200 CAPSULE ORAL at 22:50

## 2021-12-27 RX ADMIN — MORPHINE SULFATE 2 MG: 2 INJECTION, SOLUTION INTRAMUSCULAR; INTRAVENOUS at 19:28

## 2021-12-27 RX ADMIN — PROPOFOL 30 MG: 10 INJECTION, EMULSION INTRAVENOUS at 15:00

## 2021-12-27 RX ADMIN — MORPHINE SULFATE 2 MG: 2 INJECTION, SOLUTION INTRAMUSCULAR; INTRAVENOUS at 19:43

## 2021-12-27 RX ADMIN — HYDROMORPHONE HYDROCHLORIDE 0.2 MG: 1 INJECTION, SOLUTION INTRAMUSCULAR; INTRAVENOUS; SUBCUTANEOUS at 18:45

## 2021-12-27 NOTE — OP NOTES
OPERATIVE REPORT  RIGHT TOTAL CONVERSION  HIP REPLACEMENT (ANTERIOR APPROACH)    NAME: Jacquie Halsted  MRN: [id number]  :  1990  AGE: 32 y.o. DATE OF SURGERY:  2021    PREOPERATIVE DIAGNOSIS: Severe degenerative joint disease, right hip. Hip Dysplasia. POSTOPERATIVE DIAGNOSIS: Severe degenerative joint disease, right hip. Hip Dysplasia    PROCEDURES PERFORMED: Right conversion  total hip replacement - Anterior approach    SURGEON: Umm Oconnell MD.    ASSISTANT: Jesse Mark PA-C    ANESTHESIA: General    ESTIMATED BLOOD LOSS: 200cc mL. DRAINS: None. COMPLICATIONS: None. SPECIMENS REMOVED: None. PRE-OP ANTIBIOTIC: Ancef 2 gram    IMPLANT:   Implant Name Type Inv.  Item Serial No.  Lot No. LRB No. Used Action   SHELL ACET SZ C [id number] HIP OSSEOTI 3 LIMIT H G7 - SNA  SHELL ACET SZ C [id number] HIP OSSEOTI 3 LIMIT H G7 NA [employer] [id number] Right 1 Implanted   Bone Screw Self Tapping 6.5mm Diameter 50mm Length   NA  [id number] Right 1 Implanted   G7 DUAL MOBILITY LINER 38MM C - SNA  G7 DUAL MOBILITY LINER 38MM C NA [employer]_ [id number] Right 1 Implanted   SCR ACET CANC PINN 6.5X15MM SS --  - SNA  SCR ACET CANC PINN 6.5X15MM SS --  NA [facility] [employer]THOPED[employer] [id number] Right 1 Implanted   SCREW BNE L20MM DIA6.5MM CANC HIP S STL GRIPTION FULL THRD - SNA  SCREW BNE L20MM DIA6.5MM CANC HIP S STL GRIPTION FULL THRD NA [facility] [employer] ORTHOPEDICS_ [id number] Right 1 Implanted   STEM FEM SZ 1 HI OFFSET CLLRD HIP ARTC EZ  TAPR ACTIS - SNA  STEM FEM SZ 1 HI OFFSET CLLRD HIP ARTC EZ  TAPR ACTIS NA [employer] ORTHOPEDICS_ [id number] Right 1 Implanted   BEARING TIB 28X38 MM HIP VIVACIT-E - SNA  BEARING TIB 28X38 MM HIP VIVACIT-E NA [employer]ED[employer]_ [id number] Right 1 Implanted   HEAD FEM [id number] +8.5MM OFFSET  TAPR HIP CERAMIC - SNA  HEAD FEM [id number] +8.5MM OFFSET  TAPR HIP CERAMIC NA [employer] ORTHOPEDICS_ [id number] Right 1 Implanted       INDICATIONS: 32 yrs female  with severe DJD of the right hip. The patient's right hip has been progressive in terms of symptoms. The patient now has severe activity limitation. The patient has continued with conservative management without adequate relief or improvement of functional limitations. We discussed options and she wished to proceed with right conversion total hip replacement. The patient has continued with conservative management without adequate relief or improvement of functional limitations. Patient has had a prior labral repair and also a history of hypermobility    DESCRIPTION OF PROCEDURE: Anesthetic was initiated. Preoperative dose of IV Ancef was given. Aguilar catheter was not placed. The right side was confirmed as the operative side, prepped and draped in the usual sterile fashion. Skin was covered with Ioban occlusive dressing. Direct anterior exposure was made to the patient's hip through the sartorius tensor interval.  Note of prior surgery and scarring of abductor muscles to the hip capsule. Anterior hip vasculature was cauterized. Retractors were taken out to observe for bleeding and there was none. The capsule was identified, opened and T'd distally. The femoral neck was osteotomized. Femoral head was removed from the acetabulum, which was exposed and soft tissues were removed. The acetabulum was progressively reamed to 47 and a 48 trial shell was impacted with good press-fit. This was removed and a 48 shell was impacted in the acetabulum in 45 degrees of abduction in an anatomic-type anteversion. Bone spurs were removed and 6.5 screws x3 were placed. The metal DM  liner was placed. Femur was positioned and elevated from the wound. The medullary canal was entered. Flexible reamers were not utilized as the patient did not have a narrowed femoral canal, broached to a size 1 Hi offset. Calcar planed and then trialed.  A +8.5  hip ball was the most appropriate for leg length and tension with a standard offset stem. The hip was dislocated. The anterior greater trochanter was trimmed down to enhance flexion, rotation and stability. The trial was removed and the real stem was impacted. The real hip ball was placed. The hip was reduced. After copious irrigation, the capsule was closed with #2 Vicryl sutures. I irrigated the skin, subcutaneous and deep wound. I closed the fascia of the tensor fascia carlos with #2 Vicryl sutures. Skin and subcutaneous were irrigated. Soft tissues were infiltrated with local anesthetic. Skin and subcutaneous were closed in a standard fashion. Sterile dressing was applied. There were no complications. No specimen was sent. The procedure was aCONVERSION RIGHT TOTAL HIP REPLACEMENT using a Depuy stem and Meka cup total hip construct. The patient was transferred to the recovery room in stable condition. Phillip Lugo PA-C was critical throughout the case to assist with positioning, retraction and closure. There were no other available residents or surgical assistants to assist during this procedure.     Lior Beckwith MD

## 2021-12-27 NOTE — PERIOP NOTES
Patient altered consent and made additions to procedure ordered prior to signing, Dr. Danielle Bolanos aware and made no further changes and alterations.

## 2021-12-27 NOTE — ANESTHESIA PREPROCEDURE EVALUATION
Relevant Problems   NEUROLOGY   (+) ADHD (attention deficit hyperactivity disorder)      CARDIOVASCULAR   (+) HTN (hypertension)       Anesthetic History               Review of Systems / Medical History  Patient summary reviewed, nursing notes reviewed and pertinent labs reviewed    Pulmonary                   Neuro/Psych         Psychiatric history     Cardiovascular    Hypertension        Dysrhythmias            GI/Hepatic/Renal                Endo/Other        Arthritis     Other Findings   Comments: Mitochondrial Disorder pt and mother states that she can have propofol  High pain/anxiety          Physical Exam    Airway  Mallampati: I  TM Distance: > 6 cm  Neck ROM: normal range of motion   Mouth opening: Normal     Cardiovascular    Rhythm: regular           Dental  No notable dental hx       Pulmonary  Breath sounds clear to auscultation               Abdominal         Other Findings            Anesthetic Plan    ASA: 3  Anesthesia type: general          Induction: Intravenous  Anesthetic plan and risks discussed with: Patient

## 2021-12-27 NOTE — ANESTHESIA POSTPROCEDURE EVALUATION
Procedure(s):  RIGHT TOTAL HIP ARTHROPLASTY ANTERIOR APPROACH. general, total IV anesthesia    Anesthesia Post Evaluation      Multimodal analgesia: multimodal analgesia used between 6 hours prior to anesthesia start to PACU discharge  Patient location during evaluation: PACU  Patient participation: complete - patient participated  Level of consciousness: awake  Pain management: adequate  Airway patency: patent  Anesthetic complications: no  Cardiovascular status: acceptable  Respiratory status: acceptable  Hydration status: acceptable  Comments: Seen, no complaints   Post anesthesia nausea and vomiting:  none  Final Post Anesthesia Temperature Assessment:  Normothermia (36.0-37.5 degrees C)      INITIAL Post-op Vital signs:   Vitals Value Taken Time   /85 12/27/21 1820   Temp 37.1 °C (98.8 °F) 12/27/21 1804   Pulse 87 12/27/21 1825   Resp 20 12/27/21 1825   SpO2 92 % 12/27/21 1825   Vitals shown include unvalidated device data.

## 2021-12-28 LAB
ANION GAP SERPL CALC-SCNC: 6 MMOL/L (ref 5–15)
BUN SERPL-MCNC: 10 MG/DL (ref 6–20)
BUN/CREAT SERPL: 11 (ref 12–20)
CALCIUM SERPL-MCNC: 8.5 MG/DL (ref 8.5–10.1)
CHLORIDE SERPL-SCNC: 109 MMOL/L (ref 97–108)
CO2 SERPL-SCNC: 23 MMOL/L (ref 21–32)
CREAT SERPL-MCNC: 0.91 MG/DL (ref 0.55–1.02)
GLUCOSE SERPL-MCNC: 164 MG/DL (ref 65–100)
HGB BLD-MCNC: 11 G/DL (ref 11.5–16)
POTASSIUM SERPL-SCNC: 4.3 MMOL/L (ref 3.5–5.1)
SODIUM SERPL-SCNC: 138 MMOL/L (ref 136–145)

## 2021-12-28 PROCEDURE — 74011250637 HC RX REV CODE- 250/637: Performed by: ORTHOPAEDIC SURGERY

## 2021-12-28 PROCEDURE — 74011250637 HC RX REV CODE- 250/637: Performed by: PHYSICIAN ASSISTANT

## 2021-12-28 PROCEDURE — 85018 HEMOGLOBIN: CPT

## 2021-12-28 PROCEDURE — 80048 BASIC METABOLIC PNL TOTAL CA: CPT

## 2021-12-28 PROCEDURE — 74011000250 HC RX REV CODE- 250: Performed by: PHYSICIAN ASSISTANT

## 2021-12-28 PROCEDURE — 36415 COLL VENOUS BLD VENIPUNCTURE: CPT

## 2021-12-28 PROCEDURE — 74011250636 HC RX REV CODE- 250/636: Performed by: ORTHOPAEDIC SURGERY

## 2021-12-28 PROCEDURE — 51798 US URINE CAPACITY MEASURE: CPT

## 2021-12-28 PROCEDURE — 74011250636 HC RX REV CODE- 250/636: Performed by: PHYSICIAN ASSISTANT

## 2021-12-28 RX ORDER — OXYCODONE HYDROCHLORIDE 5 MG/1
15 TABLET ORAL
Status: DISCONTINUED | OUTPATIENT
Start: 2021-12-28 | End: 2021-12-28

## 2021-12-28 RX ORDER — MORPHINE SULFATE 30 MG/1
30 TABLET, FILM COATED, EXTENDED RELEASE ORAL EVERY 12 HOURS
Status: DISCONTINUED | OUTPATIENT
Start: 2021-12-28 | End: 2021-12-28

## 2021-12-28 RX ORDER — MORPHINE SULFATE 15 MG/1
30 TABLET, FILM COATED, EXTENDED RELEASE ORAL EVERY 12 HOURS
Status: DISCONTINUED | OUTPATIENT
Start: 2021-12-28 | End: 2021-12-28

## 2021-12-28 RX ORDER — KETOROLAC TROMETHAMINE 30 MG/ML
15 INJECTION, SOLUTION INTRAMUSCULAR; INTRAVENOUS EVERY 6 HOURS
Status: COMPLETED | OUTPATIENT
Start: 2021-12-28 | End: 2021-12-29

## 2021-12-28 RX ORDER — HYDROMORPHONE HYDROCHLORIDE 1 MG/ML
1 INJECTION, SOLUTION INTRAMUSCULAR; INTRAVENOUS; SUBCUTANEOUS
Status: DISCONTINUED | OUTPATIENT
Start: 2021-12-28 | End: 2021-12-28

## 2021-12-28 RX ORDER — OXYCODONE HYDROCHLORIDE 5 MG/1
20 TABLET ORAL EVERY 4 HOURS
Status: DISCONTINUED | OUTPATIENT
Start: 2021-12-28 | End: 2021-12-29 | Stop reason: HOSPADM

## 2021-12-28 RX ORDER — OXYCODONE HYDROCHLORIDE 5 MG/1
20 TABLET ORAL
Status: DISCONTINUED | OUTPATIENT
Start: 2021-12-28 | End: 2021-12-28

## 2021-12-28 RX ORDER — NALOXONE HYDROCHLORIDE 0.4 MG/ML
0.4 INJECTION, SOLUTION INTRAMUSCULAR; INTRAVENOUS; SUBCUTANEOUS
Status: DISCONTINUED | OUTPATIENT
Start: 2021-12-28 | End: 2021-12-29 | Stop reason: HOSPADM

## 2021-12-28 RX ORDER — MORPHINE SULFATE 15 MG/1
30 TABLET, FILM COATED, EXTENDED RELEASE ORAL EVERY 8 HOURS
Status: DISCONTINUED | OUTPATIENT
Start: 2021-12-28 | End: 2021-12-29 | Stop reason: HOSPADM

## 2021-12-28 RX ORDER — PROMETHAZINE HYDROCHLORIDE 25 MG/1
12.5 TABLET ORAL
Status: DISCONTINUED | OUTPATIENT
Start: 2021-12-28 | End: 2021-12-29

## 2021-12-28 RX ORDER — ALPRAZOLAM 0.5 MG/1
1 TABLET ORAL 3 TIMES DAILY
Status: DISCONTINUED | OUTPATIENT
Start: 2021-12-28 | End: 2021-12-29 | Stop reason: HOSPADM

## 2021-12-28 RX ORDER — MORPHINE SULFATE 2 MG/ML
2 INJECTION, SOLUTION INTRAMUSCULAR; INTRAVENOUS ONCE
Status: COMPLETED | OUTPATIENT
Start: 2021-12-28 | End: 2021-12-28

## 2021-12-28 RX ORDER — TIZANIDINE 4 MG/1
4 TABLET ORAL 3 TIMES DAILY
Status: DISCONTINUED | OUTPATIENT
Start: 2021-12-28 | End: 2021-12-29 | Stop reason: HOSPADM

## 2021-12-28 RX ORDER — HYDROMORPHONE HYDROCHLORIDE 2 MG/ML
2 INJECTION, SOLUTION INTRAMUSCULAR; INTRAVENOUS; SUBCUTANEOUS
Status: DISCONTINUED | OUTPATIENT
Start: 2021-12-28 | End: 2021-12-29

## 2021-12-28 RX ORDER — OXYCODONE HYDROCHLORIDE 5 MG/1
10 TABLET ORAL
Status: DISCONTINUED | OUTPATIENT
Start: 2021-12-28 | End: 2021-12-29 | Stop reason: HOSPADM

## 2021-12-28 RX ADMIN — HYDROMORPHONE HYDROCHLORIDE 2 MG: 2 INJECTION, SOLUTION INTRAMUSCULAR; INTRAVENOUS; SUBCUTANEOUS at 19:02

## 2021-12-28 RX ADMIN — POLYETHYLENE GLYCOL 3350 17 G: 17 POWDER, FOR SOLUTION ORAL at 08:29

## 2021-12-28 RX ADMIN — CYCLOBENZAPRINE 10 MG: 10 TABLET, FILM COATED ORAL at 07:38

## 2021-12-28 RX ADMIN — ACETAMINOPHEN 1000 MG: 500 TABLET ORAL at 12:13

## 2021-12-28 RX ADMIN — DOCUSATE SODIUM 50 MG AND SENNOSIDES 8.6 MG 1 TABLET: 8.6; 5 TABLET, FILM COATED ORAL at 17:55

## 2021-12-28 RX ADMIN — ACETAMINOPHEN 1000 MG: 500 TABLET ORAL at 07:38

## 2021-12-28 RX ADMIN — GABAPENTIN 600 MG: 600 TABLET, FILM COATED ORAL at 08:27

## 2021-12-28 RX ADMIN — Medication: at 13:39

## 2021-12-28 RX ADMIN — GABAPENTIN 600 MG: 600 TABLET, FILM COATED ORAL at 21:59

## 2021-12-28 RX ADMIN — OXYCODONE HYDROCHLORIDE 10 MG: 5 TABLET ORAL at 01:42

## 2021-12-28 RX ADMIN — DOCUSATE SODIUM 50 MG AND SENNOSIDES 8.6 MG 1 TABLET: 8.6; 5 TABLET, FILM COATED ORAL at 08:27

## 2021-12-28 RX ADMIN — TIZANIDINE 4 MG: 4 TABLET ORAL at 21:59

## 2021-12-28 RX ADMIN — OXYCODONE HYDROCHLORIDE 10 MG: 5 TABLET ORAL at 04:43

## 2021-12-28 RX ADMIN — MORPHINE SULFATE 30 MG: 30 TABLET, FILM COATED, EXTENDED RELEASE ORAL at 14:07

## 2021-12-28 RX ADMIN — HYDROMORPHONE HYDROCHLORIDE 0.5 MG: 1 INJECTION, SOLUTION INTRAMUSCULAR; INTRAVENOUS; SUBCUTANEOUS at 00:38

## 2021-12-28 RX ADMIN — OXYCODONE HYDROCHLORIDE 15 MG: 5 TABLET ORAL at 15:17

## 2021-12-28 RX ADMIN — ALPRAZOLAM 1 MG: 0.5 TABLET ORAL at 21:59

## 2021-12-28 RX ADMIN — Medication 10 ML: at 07:41

## 2021-12-28 RX ADMIN — MORPHINE SULFATE 30 MG: 15 TABLET, FILM COATED, EXTENDED RELEASE ORAL at 23:48

## 2021-12-28 RX ADMIN — METOPROLOL SUCCINATE 25 MG: 25 TABLET, FILM COATED, EXTENDED RELEASE ORAL at 08:27

## 2021-12-28 RX ADMIN — HYDROMORPHONE HYDROCHLORIDE 0.5 MG: 1 INJECTION, SOLUTION INTRAMUSCULAR; INTRAVENOUS; SUBCUTANEOUS at 12:13

## 2021-12-28 RX ADMIN — ACETAMINOPHEN 1000 MG: 500 TABLET ORAL at 00:38

## 2021-12-28 RX ADMIN — CYCLOBENZAPRINE 10 MG: 10 TABLET, FILM COATED ORAL at 21:59

## 2021-12-28 RX ADMIN — ONDANSETRON 4 MG: 2 INJECTION INTRAMUSCULAR; INTRAVENOUS at 08:28

## 2021-12-28 RX ADMIN — ALPRAZOLAM 1 MG: 1 TABLET ORAL at 08:27

## 2021-12-28 RX ADMIN — ASPIRIN 325 MG: 325 TABLET, COATED ORAL at 08:26

## 2021-12-28 RX ADMIN — CEFAZOLIN SODIUM 2 G: 1 INJECTION, POWDER, FOR SOLUTION INTRAMUSCULAR; INTRAVENOUS at 07:38

## 2021-12-28 RX ADMIN — HYDROMORPHONE HYDROCHLORIDE 0.5 MG: 1 INJECTION, SOLUTION INTRAMUSCULAR; INTRAVENOUS; SUBCUTANEOUS at 04:09

## 2021-12-28 RX ADMIN — OXYCODONE 20 MG: 5 TABLET ORAL at 22:46

## 2021-12-28 RX ADMIN — GABAPENTIN 600 MG: 600 TABLET, FILM COATED ORAL at 15:17

## 2021-12-28 RX ADMIN — PROMETHAZINE HYDROCHLORIDE 12.5 MG: 25 TABLET ORAL at 14:04

## 2021-12-28 RX ADMIN — CYCLOBENZAPRINE 10 MG: 10 TABLET, FILM COATED ORAL at 00:26

## 2021-12-28 RX ADMIN — TIZANIDINE 4 MG: 4 TABLET ORAL at 17:55

## 2021-12-28 RX ADMIN — MORPHINE SULFATE 2 MG: 2 INJECTION, SOLUTION INTRAMUSCULAR; INTRAVENOUS at 11:09

## 2021-12-28 RX ADMIN — Medication 10 ML: at 22:00

## 2021-12-28 RX ADMIN — HYDROMORPHONE HYDROCHLORIDE 0.5 MG: 1 INJECTION, SOLUTION INTRAMUSCULAR; INTRAVENOUS; SUBCUTANEOUS at 08:28

## 2021-12-28 RX ADMIN — CELECOXIB 200 MG: 200 CAPSULE ORAL at 08:26

## 2021-12-28 RX ADMIN — ACETAMINOPHEN 1000 MG: 500 TABLET ORAL at 17:55

## 2021-12-28 RX ADMIN — ASPIRIN 325 MG: 325 TABLET, COATED ORAL at 17:55

## 2021-12-28 RX ADMIN — HYDROMORPHONE HYDROCHLORIDE 2 MG: 2 INJECTION, SOLUTION INTRAMUSCULAR; INTRAVENOUS; SUBCUTANEOUS at 21:57

## 2021-12-28 RX ADMIN — Medication 15 MG: at 17:55

## 2021-12-28 RX ADMIN — HYDROMORPHONE HYDROCHLORIDE 1 MG: 1 INJECTION, SOLUTION INTRAMUSCULAR; INTRAVENOUS; SUBCUTANEOUS at 16:50

## 2021-12-28 RX ADMIN — PROMETHAZINE HYDROCHLORIDE 12.5 MG: 25 TABLET ORAL at 19:10

## 2021-12-28 RX ADMIN — HYDROMORPHONE HYDROCHLORIDE 2 MG: 2 INJECTION, SOLUTION INTRAMUSCULAR; INTRAVENOUS; SUBCUTANEOUS at 23:53

## 2021-12-28 RX ADMIN — OXYCODONE HYDROCHLORIDE 10 MG: 5 TABLET ORAL at 07:38

## 2021-12-28 RX ADMIN — SODIUM CHLORIDE 125 ML/HR: 9 INJECTION, SOLUTION INTRAVENOUS at 04:45

## 2021-12-28 RX ADMIN — ALPRAZOLAM 1 MG: 0.5 TABLET ORAL at 15:17

## 2021-12-28 RX ADMIN — Medication 5 ML: at 14:00

## 2021-12-28 NOTE — PROGRESS NOTES
Patient non compliant with mobility rules, continues to get up without calling. Patient refused bed alarm. We discussed risks of getting up without help.

## 2021-12-28 NOTE — ROUTINE PROCESS
Paged on call physician @ [phone] awaiting call back. Pt. Has concerns about pain management and is retaining 538 ml in her bladder.

## 2021-12-28 NOTE — PROGRESS NOTES
CM went in to meet with pt and her parents for initial eval earlier today. CM was promptly told that this pt cannot participate in a conversation with CM today. She is complaining of a great deal of pain. Nurse notified.  FANNY Lux

## 2021-12-28 NOTE — PROGRESS NOTES
Spiritual Care Assessment/Progress Note  [facility]      NAME: Ayo Osorio      MRN: [id number]  AGE: 32 y.o.  SEX: female  Taoist Affiliation: Aaron Justin   Language: English     12/28/2021     Total Time (in minutes): 10     Spiritual Assessment begun in [address] [address] through conversation with:         [x]Patient        [] Family    [] Friend(s)        Reason for Consult: Palliative Care, Initial/Spiritual Assessment     Spiritual beliefs: (Please include comment if needed)     [x] Identifies with a elijah tradition:         [] Supported by a elijah community:            [] Claims no spiritual orientation:           [] Seeking spiritual identity:                [] Adheres to an individual form of spirituality:           [] Not able to assess:                           Identified resources for coping:      [x] Prayer                               [] Music                  [] Guided Imagery     [x] Family/friends                 [] Pet visits     [] Devotional reading                         [] Unknown     [] Other:                                             Interventions offered during this visit: (See comments for more details)    Patient Interventions: Affirmation of emotions/emotional suffering,Affirmation of elijah,Catharsis/review of pertinent events in supportive environment,Initial/Spiritual assessment, patient floor,Life review/legacy,Prayer (actual),Prayer (assurance of)           Plan of Care:     [] Support spiritual and/or cultural needs    [] Support AMD and/or advance care planning process      [] Support grieving process   [] Coordinate Rites and/or Rituals    [] Coordination with community clergy   [] No spiritual needs identified at this time   [] Detailed Plan of Care below (See Comments)  [] Make referral to Music Therapy  [] Make referral to Pet Therapy     [] Make referral to Addiction services  [] Make referral to [facility]  [] Make referral to Spiritual Care Partner  [] No future visits requested        [x] Contact Spiritual Care for further referrals     Comments: Provided support for this pt in [facility]. Reviewed pt's chart prior to this visit. Facilitated life review to assess potential support needs or coping strategies. Pt offered review of current situation. Provided prayer for pt at pt's request.  Provided pastoral support as pt shared thoughts and feelings about her medical and personal stressors. Assured pt of continued prayers. Alysia Arreaga MDiv.  Staff   Request  Support/Spiritual Care Services on [phone])

## 2021-12-28 NOTE — PROGRESS NOTES
Patient on an extreme amount of narcotic pain medicine at baseline. She continues to have severe pain here. Medicine is maxed out from my perspective. Continue to monitor very closely. Place on continuous pulse ox/remote tele. Plan discussed with Dr. Kenn Funk. Follow up with pain mgmt physician upon discharge.

## 2021-12-28 NOTE — PROGRESS NOTES
Primary Nurse Von Vásquez RN and Ricardo Smith RN performed a dual skin assessment on this patient No impairment noted. General bruising noted. Vlad score is 21.

## 2021-12-28 NOTE — CONSULTS
PALLIATIVE MEDICINE      The consult to our team has been cancelled. Discussed with Alvaro VICKERS. Cristiana Bustamante.  [address] MSN, FNP-BC, [facility]

## 2021-12-28 NOTE — PROGRESS NOTES
Complaints: None that are unexpected. Pain issues are noted  Events: None. Patient found walking around her room. GEN:  NAD. AOx3   ABD:  S/NT/ND   RLE:  Dressing C/D/I    5/5 motor    Calf nttp (Bilat)    Sensate all distribution to light touch    1+ dp/pt pulses, foot perfused      Lab Results   Component Value Date/Time    HGB 11.0 (L) 12/28/2021 12:45 AM    INR 1.0 12/10/2021 03:36 PM       Lab Results   Component Value Date/Time    Sodium 138 12/28/2021 12:45 AM    Potassium 4.3 12/28/2021 12:45 AM    Chloride 109 (H) 12/28/2021 12:45 AM    CO2 23 12/28/2021 12:45 AM    BUN 10 12/28/2021 12:45 AM    Creatinine 0.91 12/28/2021 12:45 AM    Calcium 8.5 12/28/2021 12:45 AM            POD #1 RIGHT TOTAL HIP REPLACEMENT. Satisfactory progress. ABX: Complete today  PATHWAY: D/C Lauren per protocol  DVT Prophylaxis: ASA  Weight Bearing: WBAT RLE   Pain Control: PRN oral narcotics  Anticipated Discharge Date: ASAP. Patient will be better at home. Disposition: Home, PT.

## 2021-12-28 NOTE — PROGRESS NOTES
Occupational Therapy Note:  Orders received and appreciated. Chart reviewed. Spoke with RN and pt. Pt loudly stated she was in too much pain to participate and not to come back for at least 3-4 days. Educated pt on benefits of mobility and ADL participation post TENZIN. She still refused and stated she already has a therapist.  Pt stated she will let MD know she will not participate in anything until her pain is managed. Will follow up tomorrow per RN request.  Thank you for the consult.   Asif Singh OTR/MAHAMED, PATRICIAIS

## 2021-12-28 NOTE — PROGRESS NOTES
The following herbal, alternative, and/or nutritional/dietary supplement product(s) has been discontinued  per [employer] approved policy:    [id number] mg capsule    Please reorder upon discharge if appropriate.

## 2021-12-28 NOTE — PROGRESS NOTES
OMA: The patient plans to discharge home with [facility] home health and family to transport when stable for discharge. Rolling walker to be delivered by [employer] prior to discharge. RUR: N/A    1. The patient is from home and lives with parents. 2. Orthopedics, PT/OT following. 3. The patient plans to discharge home with home health. Care Management Interventions  PCP Verified by CM: Yes  Palliative Care Criteria Met (RRAT>21 & CHF Dx)?: No  Mode of Transport at Discharge: Other (see comment)  Transition of Care Consult (CM Consult): Chris: No  Reason Outside [place]: Physician referred to specific agency  MyChart Signup: Yes  Discharge Durable Medical Equipment: Yes (Rolling Walker)  Physical Therapy Consult: Yes  Occupational Therapy Consult: Yes  Speech Therapy Consult: No  Support Systems: Parent(s)  Confirm Follow Up Transport: Family  The Plan for Transition of Care is Related to the Following Treatment Goals : The patient plans to discharge home. The Patient and/or Patient Representative was Provided with a Choice of Provider and Agrees with the Discharge Plan?: Yes  Freedom of Choice List was Provided with Basic Dialogue that Supports the Patient's Individualized Plan of Care/Goals, Treatment Preferences and Shares the Quality Data Associated with the Providers?: Yes  [place] Resource Information Provided?: No  Discharge Location  Discharge Placement: Home with home health     Reason for Admission:  Right Total Hip                     RUR Score:  N/A                   Plan for utilizing home health: The Plan for Transition of Care is related to the following treatment goals: Home Health    The Patient and/or patient representative Jermaine Gutiérrezmaru was provided with a choice of provider and agrees   with the discharge plan.  [x] Yes [] No    Freedom of choice list was provided with basic dialogue that supports the patient's individualized plan of care/goals, treatment preferences and shares the quality data associated with the providers. [x] Yes [] No          PCP: First and Last name:  Ana Hernandez NP, phone: [phone]     Name of Practice:    Are you a current patient: Yes/No: Yes   Approximate date of last visit: Dec, 2021   Can you participate in a virtual visit with your PCP: Yes                    Current Advanced Directive/Advance Care Plan: Full Code      Healthcare Decision Maker:   Click here to complete [address] including selection of the Healthcare Decision Maker Relationship (ie \"Primary\")                             Transition of Care Plan:        CM met with the patient in room 554. The patient is alert and oriented x4. Confirmed demographics. Prior to surgery, the patient was independent with ADL's/IADL's and uses [address] [place] [address] on [address]. The patient has had [facility] in the past and requests their services again. CM sent referral. The patient plans to discharge home with home health and family to transport. The patient needs a rolling walker and CM sent referral to order rolling walker and to have it delivered prior to discharge. CM following for discharge needs.     Maria C Gonsalves RN/CRM

## 2021-12-28 NOTE — PERIOP NOTES
Spoke to Dr. Maria Eugenia Kitchen ([address] [facility]).  She said that they don't come in here after 5 pm.

## 2021-12-28 NOTE — PROGRESS NOTES
Problem: Falls - Risk of  Goal: *Absence of Falls  Description: Document Juliet Marking Fall Risk and appropriate interventions in the flowsheet.   Outcome: Progressing Towards Goal  Note: Fall Risk Interventions:  Mobility Interventions: Patient to call before getting OOB         Medication Interventions: Patient to call before getting OOB    Elimination Interventions: Patient to call for help with toileting needs              Problem: Patient Education: Go to Patient Education Activity  Goal: Patient/Family Education  Outcome: Progressing Towards Goal

## 2021-12-28 NOTE — PERIOP NOTES
Called Dr. Gregory Michael to inform him that pt is upset that she does not have a Aguilar Catheter and she wants  One. Dr. Gregory Michael said NO.  Pt's mother said that last time she had Surgery, the floor nurse had to transfer her to ICU since they were not able to handle her on the floor. Dr. Gregory Michael said that the pt. Is not going to ICU. (There is No ICU beds available when I asked the Nursing Supervisor). I was able to get an RX for a PCA Pump for this pt. Takes 30 mg of Morphine every 4 Hrs. @ home. PCA orders were obtained with the Pharmacist Guidelines.

## 2021-12-29 VITALS
RESPIRATION RATE: 16 BRPM | HEART RATE: 105 BPM | SYSTOLIC BLOOD PRESSURE: 125 MMHG | OXYGEN SATURATION: 97 % | DIASTOLIC BLOOD PRESSURE: 80 MMHG | TEMPERATURE: 98.8 F | HEIGHT: 64 IN | WEIGHT: 192.02 LBS | BODY MASS INDEX: 32.78 KG/M2

## 2021-12-29 PROCEDURE — 74011250637 HC RX REV CODE- 250/637: Performed by: PHYSICIAN ASSISTANT

## 2021-12-29 PROCEDURE — 51798 US URINE CAPACITY MEASURE: CPT

## 2021-12-29 PROCEDURE — 97116 GAIT TRAINING THERAPY: CPT

## 2021-12-29 PROCEDURE — 74011250636 HC RX REV CODE- 250/636: Performed by: PHYSICIAN ASSISTANT

## 2021-12-29 PROCEDURE — 74011250637 HC RX REV CODE- 250/637: Performed by: ORTHOPAEDIC SURGERY

## 2021-12-29 PROCEDURE — 97161 PT EVAL LOW COMPLEX 20 MIN: CPT

## 2021-12-29 PROCEDURE — 97530 THERAPEUTIC ACTIVITIES: CPT

## 2021-12-29 RX ORDER — PROMETHAZINE HYDROCHLORIDE 25 MG/1
25 TABLET ORAL
Status: DISCONTINUED | OUTPATIENT
Start: 2021-12-29 | End: 2021-12-29

## 2021-12-29 RX ORDER — HYDROMORPHONE HYDROCHLORIDE 2 MG/1
2-4 TABLET ORAL
Qty: 80 TABLET | Refills: 0 | Status: SHIPPED | OUTPATIENT
Start: 2021-12-29 | End: 2022-01-07 | Stop reason: ALTCHOICE

## 2021-12-29 RX ORDER — HYDROMORPHONE HYDROCHLORIDE 2 MG/ML
2 INJECTION, SOLUTION INTRAMUSCULAR; INTRAVENOUS; SUBCUTANEOUS
Status: DISCONTINUED | OUTPATIENT
Start: 2021-12-29 | End: 2021-12-29 | Stop reason: HOSPADM

## 2021-12-29 RX ORDER — KETOROLAC TROMETHAMINE 30 MG/ML
15 INJECTION, SOLUTION INTRAMUSCULAR; INTRAVENOUS EVERY 6 HOURS
Status: DISCONTINUED | OUTPATIENT
Start: 2021-12-29 | End: 2021-12-29 | Stop reason: HOSPADM

## 2021-12-29 RX ORDER — PROMETHAZINE HYDROCHLORIDE 25 MG/1
25 TABLET ORAL EVERY 6 HOURS
Status: DISCONTINUED | OUTPATIENT
Start: 2021-12-29 | End: 2021-12-29 | Stop reason: HOSPADM

## 2021-12-29 RX ORDER — ASPIRIN 325 MG
325 TABLET, DELAYED RELEASE (ENTERIC COATED) ORAL 2 TIMES DAILY
Qty: 30 TABLET | Refills: 0 | Status: SHIPPED | OUTPATIENT
Start: 2021-12-29

## 2021-12-29 RX ORDER — HYDROMORPHONE HYDROCHLORIDE 2 MG/1
2-4 TABLET ORAL
Status: DISCONTINUED | OUTPATIENT
Start: 2021-12-29 | End: 2021-12-29 | Stop reason: HOSPADM

## 2021-12-29 RX ADMIN — Medication 5 ML: at 14:00

## 2021-12-29 RX ADMIN — CYCLOBENZAPRINE 10 MG: 10 TABLET, FILM COATED ORAL at 13:05

## 2021-12-29 RX ADMIN — KETOROLAC TROMETHAMINE 15 MG: 30 INJECTION, SOLUTION INTRAMUSCULAR; INTRAVENOUS at 13:03

## 2021-12-29 RX ADMIN — OXYCODONE 20 MG: 5 TABLET ORAL at 13:04

## 2021-12-29 RX ADMIN — MULTIPLE VITAMINS W/ MINERALS TAB 1 TABLET: TAB at 08:28

## 2021-12-29 RX ADMIN — Medication 15 MG: at 06:58

## 2021-12-29 RX ADMIN — ALPRAZOLAM 1 MG: 0.5 TABLET ORAL at 04:59

## 2021-12-29 RX ADMIN — TIZANIDINE 4 MG: 4 TABLET ORAL at 08:29

## 2021-12-29 RX ADMIN — DOCUSATE SODIUM 50 MG AND SENNOSIDES 8.6 MG 1 TABLET: 8.6; 5 TABLET, FILM COATED ORAL at 08:26

## 2021-12-29 RX ADMIN — DEXTROAMPHETAMINE SACCHARATE, AMPHETAMINE ASPARTATE, DEXTROAMPHETAMINE SULFATE, AMPHETAMINE SULFATE TABLETS, 10 MG,CLL 30 MG: 2.5; 2.5; 2.5; 2.5 TABLET ORAL at 08:26

## 2021-12-29 RX ADMIN — TIZANIDINE 4 MG: 4 TABLET ORAL at 15:18

## 2021-12-29 RX ADMIN — PROMETHAZINE HYDROCHLORIDE 25 MG: 25 TABLET ORAL at 13:03

## 2021-12-29 RX ADMIN — HYDROMORPHONE HYDROCHLORIDE 2 MG: 2 INJECTION, SOLUTION INTRAMUSCULAR; INTRAVENOUS; SUBCUTANEOUS at 09:41

## 2021-12-29 RX ADMIN — ALPRAZOLAM 1 MG: 0.5 TABLET ORAL at 15:18

## 2021-12-29 RX ADMIN — ALPRAZOLAM 1 MG: 0.5 TABLET ORAL at 08:26

## 2021-12-29 RX ADMIN — OXYCODONE 20 MG: 5 TABLET ORAL at 16:32

## 2021-12-29 RX ADMIN — HYDROMORPHONE HYDROCHLORIDE 2 MG: 2 INJECTION, SOLUTION INTRAMUSCULAR; INTRAVENOUS; SUBCUTANEOUS at 04:54

## 2021-12-29 RX ADMIN — METOPROLOL SUCCINATE 25 MG: 25 TABLET, FILM COATED, EXTENDED RELEASE ORAL at 08:27

## 2021-12-29 RX ADMIN — Medication 10 ML: at 06:00

## 2021-12-29 RX ADMIN — GABAPENTIN 600 MG: 600 TABLET, FILM COATED ORAL at 15:18

## 2021-12-29 RX ADMIN — ACETAMINOPHEN 1000 MG: 500 TABLET ORAL at 13:03

## 2021-12-29 RX ADMIN — GABAPENTIN 600 MG: 600 TABLET, FILM COATED ORAL at 08:29

## 2021-12-29 RX ADMIN — PROMETHAZINE HYDROCHLORIDE 12.5 MG: 25 TABLET ORAL at 00:00

## 2021-12-29 RX ADMIN — HYDROMORPHONE HYDROCHLORIDE 4 MG: 2 TABLET ORAL at 14:51

## 2021-12-29 RX ADMIN — CYCLOBENZAPRINE 10 MG: 10 TABLET, FILM COATED ORAL at 04:58

## 2021-12-29 RX ADMIN — OXYCODONE 20 MG: 5 TABLET ORAL at 08:28

## 2021-12-29 RX ADMIN — HYDROMORPHONE HYDROCHLORIDE 2 MG: 2 INJECTION INTRAMUSCULAR; INTRAVENOUS; SUBCUTANEOUS at 12:03

## 2021-12-29 RX ADMIN — MORPHINE SULFATE 30 MG: 15 TABLET, FILM COATED, EXTENDED RELEASE ORAL at 07:17

## 2021-12-29 RX ADMIN — HYDROMORPHONE HYDROCHLORIDE 2 MG: 2 INJECTION, SOLUTION INTRAMUSCULAR; INTRAVENOUS; SUBCUTANEOUS at 01:44

## 2021-12-29 RX ADMIN — HYDROMORPHONE HYDROCHLORIDE 2 MG: 2 INJECTION, SOLUTION INTRAMUSCULAR; INTRAVENOUS; SUBCUTANEOUS at 06:58

## 2021-12-29 RX ADMIN — PROMETHAZINE HYDROCHLORIDE 12.5 MG: 25 TABLET ORAL at 08:28

## 2021-12-29 RX ADMIN — POLYETHYLENE GLYCOL 3350 17 G: 17 POWDER, FOR SOLUTION ORAL at 08:27

## 2021-12-29 RX ADMIN — Medication 15 MG: at 00:00

## 2021-12-29 RX ADMIN — MORPHINE SULFATE 30 MG: 15 TABLET, FILM COATED, EXTENDED RELEASE ORAL at 14:28

## 2021-12-29 RX ADMIN — ASPIRIN 325 MG: 325 TABLET, COATED ORAL at 08:26

## 2021-12-29 RX ADMIN — OXYCODONE 20 MG: 5 TABLET ORAL at 03:30

## 2021-12-29 RX ADMIN — ACETAMINOPHEN 1000 MG: 500 TABLET ORAL at 06:58

## 2021-12-29 NOTE — PROGRESS NOTES
Problem: Falls - Risk of  Goal: *Absence of Falls  Description: Document Tyrone Judd Fall Risk and appropriate interventions in the flowsheet.   Outcome: Progressing Towards Goal  Note: Fall Risk Interventions:  Mobility Interventions: Communicate number of staff needed for ambulation/transfer,Patient to call before getting OOB,Utilize walker, cane, or other assistive device         Medication Interventions: Evaluate medications/consider consulting pharmacy,Patient to call before getting OOB,Teach patient to arise slowly    Elimination Interventions: Call light in reach,Patient to call for help with toileting needs              Problem: Patient Education: Go to Patient Education Activity  Goal: Patient/Family Education  Outcome: Progressing Towards Goal     Problem: Hip Replacement: Day of Surgery/Unit  Goal: Off Pathway (Use only if patient is Off Pathway)  Outcome: Progressing Towards Goal  Goal: Activity/Safety  Outcome: Progressing Towards Goal  Goal: Consults, if ordered  Outcome: Progressing Towards Goal  Goal: Diagnostic Test/Procedures  Outcome: Progressing Towards Goal  Goal: Nutrition/Diet  Outcome: Progressing Towards Goal  Goal: Medications  Outcome: Progressing Towards Goal  Goal: Respiratory  Outcome: Progressing Towards Goal  Goal: Treatments/Interventions/Procedures  Outcome: Progressing Towards Goal  Goal: Psychosocial  Outcome: Progressing Towards Goal  Goal: *Initiate mobility  Outcome: Progressing Towards Goal  Goal: *Optimal pain control at patient's stated goal  Outcome: Progressing Towards Goal  Goal: *Hemodynamically stable  Outcome: Progressing Towards Goal

## 2021-12-29 NOTE — PROGRESS NOTES
PHYSICAL THERAPY EVALUATION/DISCHARGE  Patient: Rony Buck (84 y.o. female)  Date: 12/29/2021  Primary Diagnosis: Primary osteoarthritis of right hip [M16.11]  Procedure(s) (LRB):  RIGHT TOTAL HIP ARTHROPLASTY ANTERIOR APPROACH (Right) 2 Days Post-Op   Precautions:   Fall,WBAT      ASSESSMENT  Based on the objective data described below, the patient presents with  impairment in functional mobility, activity tolerance and balance s/p R TENZIN. Patient's mobility is complicated by mitochondrial disease (Ehler's Danlos Syndrome)/chronic pain issues. PLOF: Independent with ADLs and IADLs. Patient lives in a two story home with 11 steps to second floor with rail, 4 steps with rail to enter and also has wheelchair ramp. Her friend who is an Outpatient PT will be staying with her and assisting her. Patient is cleared for discharge from PT standpoint. Patient  is independent with post-op TENZIN exercise protocol and has same in written, illlustrated form. PT Discharge instructions reviewed. Patient and caregiver demonstrated understanding. Functional Outcome Measure: The patient scored 80/100 on the Barthel outcome measure which is indicative of minimal impaired ability to care for basic self-needs/dependency on others. .      Other factors to consider for discharge: Mitochondrial Disease/Motivated/A & O x 4/Supportive Family/Independent PLOF     Further skilled acute physical therapy is not indicated at this time. PLAN :  Recommendation for discharge: (in order for the patient to meet his/her long term goals)  Physical therapy at least 2 days/week in the home     This discharge recommendation:  Has been made in collaboration with the attending provider and/or case management    IF patient discharges home will need the following DME: Rolling walker: ordered/delivered/adjusted       SUBJECTIVE:   Patient stated I need for my pain to get under control before I go home.     OBJECTIVE DATA SUMMARY:   HISTORY: Past Medical History:   Diagnosis Date    ADHD (attention deficit hyperactivity disorder) 2009    dx Dr. Belinda Taylor in [place]    Adverse effect of anesthesia     [address], NOTE ABOUT WHAT ANESTHESIA WORKS BEST FOR HER MITOCHONDRIAL DISEASE.  Arrhythmia     TACHYCARDIA , HISTORY OF AFIB - SEES DR. BONILLA      Arthritis     Carnitine deficiency (HCC)     Chronic pain     from myositis, myopathy    Mg-Danlos syndrome     FH: rheumatoid arthritis 10/1/2013    Fibromyalgia     HSP (Henoch Schonlein purpura) ([address] 12/24/2014    occurs w myopathy flares    Muscle pain     Muscle weakness     Myopathy 2005    Dr. Alyssa Cardenas, Dr. John Pepe, genetic/metabolic? mitochondrial disorder,  [facility] muscle biopsy    Tiredness     VSD (ventricular septal defect)     spontaneous closure     Past Surgical History:   Procedure Laterality Date    HX ACL RECONSTRUCTION  2008    field hockey injury    HX DILATION AND EVACUATION      2016     HX LUMBAR FUSION  06/2017    L4L5 LUMBAR FUSION WITH LAMINECTOMY     HX ORTHOPAEDIC      ACL REPAIR    HX ORTHOPAEDIC  03/2021    RIGHT LABRAL HIP REPAIR     HX SKIN BIOPSY      MUSCLE BIOPSY X 2    NEUROLOGICAL PROCEDURE UNLISTED  03/2020    CERVICAL DISCKECTOMY  C3, C4, C5 AT [facility]    [place]    [place] MUS[place]OPSY  ~2007    [place]       Prior level of function: PLOF: Independent with ADLs and IADLs.   Personal factors and/or comorbidities impacting plan of care: Mitochondrial Disease/Motivated/A & O x 4/Supportive Family/Independent PLOF    Home Situation  Home Environment: Private residence  # Steps to Enter: 4  Rails to Enter: Yes  Hand Rails : Right  Wheelchair Ramp: Yes  One/Two Story Residence: Two story  # of Interior Steps: 11  Interior Rails: Right  Lift Chair Available: No  Living Alone: Yes  Support Systems:  (does not say)  Patient Expects to be Discharged toF Cor[de-identified]ration  Current DME Used/Available at Home: Cane, straight  Tub or Shower Type: Tub/Shower combination    EXAMINATION/PRESENTATION/DECISION MAKING:   Critical Behavior:  Neurologic State: Alert        Range Of Motion:  AROM: Generally decreased, functional           PROM: Generally decreased, functional           Strength:    Strength: Generally decreased, functional                    Tone & Sensation:   Tone: Normal              Sensation: Intact               Coordination:  Coordination: Within functional limits  Vision:      Functional Mobility:  Bed Mobility:     Supine to Sit: Stand-by assistance;Setup  Sit to Supine: Stand-by assistance;Setup  Scooting: Stand-by assistance;Setup  Transfers:  Sit to Stand: Stand-by assistance  Stand to Sit: Stand-by assistance        Bed to Chair: Stand-by assistance              Balance:   Sitting: Intact  Standing: Intact  Standing - Static: Good  Standing - Dynamic : Good;Constant support  Ambulation/Gait Training:  Distance (ft): 150 Feet (ft)  Assistive Device: Walker, rolling;Gait belt  Ambulation - Level of Assistance: Supervision        Gait Abnormalities: Antalgic;Decreased step clearance  Right Side Weight Bearing: As tolerated     Base of Support: Widened;Shift to left  Stance: Right decreased  Speed/Sophia: Slow  Step Length: Left shortened  Swing Pattern: Right asymmetrical     Interventions: Safety awareness training;Verbal cues            Stairs:  Number of Stairs Trained: 4  Stairs - Level of Assistance: Supervision   Rail Use: Both    Therapeutic Exercises:   Patient  is independent with post-op TENZIN exercise protocol and has same in written, illlustrated form. Functional Measure:  Barthel Index:    Bathin  Bladder: 10  Bowels: 10  Groomin  Dressin  Feeding: 10  Mobility: 10  Stairs: 5  Toilet Use: 10  Transfer (Bed to Chair and Back): 15  Total: 80/100       The Barthel ADL Index: Guidelines  1.  The index should be used as a record of what a patient does, not as a record of what a patient could do. 2. The main aim is to establish degree of independence from any help, physical or verbal, however minor and for whatever reason. 3. The need for supervision renders the patient not independent. 4. A patient's performance should be established using the best available evidence. Asking the patient, friends/relatives and nurses are the usual sources, but direct observation and common sense are also important. However direct testing is not needed. 5. Usually the patient's performance over the preceding 24-48 hours is important, but occasionally longer periods will be relevant. 6. Middle categories imply that the patient supplies over 50 per cent of the effort. 7. Use of aids to be independent is allowed. Score Interpretation (from [address] [facility])    Independent   60-79 Minimally independent   40-59 Partially dependent   20-39 Very dependent   <20 Totally dependent     -Luciana Carey., Barthel, D.W. (1965). Functional evaluation: the Barthel Index. [address] [facility] ([address] (1997). The Barthel activities of daily living index: self-reporting versus actual performance in the old (> or = 75 years). Journal of [address] [place] [address] 457), [facility], .SultanaSultana, Tosha Penaloza., [facility]. (1999). Measuring the change in disability after inpatient rehabilitation; comparison of the responsiveness of the Barthel Index and Functional Bartow Measure. Journal of Neurology, Neurosurgery, and Psychiatry, 66(4), 855-214. Milena Borrego, [place].ARTUR, RADHA Guevara.SONALI, & Gaulberto Madrigal MSultanaA. (2004) Assessment of post-stroke quality of life in cost-effectiveness studies: The usefulness of the Barthel Index and the EuroQoL-5D.  Quality of Life Research, 15, 046-89          Physical Therapy Evaluation Charge Determination   History Examination Presentation Decision-Making   MEDIUM  Complexity : 1-2 comorbidities / personal factors will impact the outcome/ POC  MEDIUM Complexity : 3 Standardized tests and measures addressing body structure, function, activity limitation and / or participation in recreation  MEDIUM Complexity : Evolving with changing characteristics  LOW Complexity : FOTO score of       Based on the above components, the patient evaluation is determined to be of the following complexity level: LOW     Pain Ratin/10    Activity Tolerance:   Good      After treatment patient left in no apparent distress:   Sitting in chair, Call bell within reach, Caregiver / family present and nurse notified. COMMUNICATION/EDUCATION:   The patients plan of care was discussed with: Registered nurse, Physician and Case management. Fall prevention education was provided and the patient/caregiver indicated understanding., Patient/family have participated as able in goal setting and plan of care. and Patient/family agree to work toward stated goals and plan of care.     Thank you for this referral.  Lucy Quiroz   Time Calculation: 30 mins

## 2021-12-29 NOTE — PROGRESS NOTES
Ortho Daily Progress Note    12/29/2021  12:05 PM    POD:  2 Days Post-Op  S/P:  Procedure(s):  RIGHT TOTAL HIP ARTHROPLASTY ANTERIOR APPROACH    Ms. Arsalan Cary is a 27yo female with a PMH of anxiety, chronic pain, fibromyalgia, Ehler-Danlos and HSP. She is on chronic narcotic medication. She is complaining of severe pain not controlled by high doses of morphine, oxycodone, dilaudid, toradol, zanaflex, xanax, gabapentin, and tylenol. She is ambulating around her room without issue on a walker. Lab Results   Component Value Date/Time    HGB 11.0 (L) 12/28/2021 12:45 AM    INR 1.0 12/10/2021 03:36 PM     Afebrile/VSS  Calves soft/NTTP Bilaterally  Incision OK; no drainage  Dressings clean and dry  Moving LE well  Neurocirculatory exam WNL.     PLAN:  Tolerating regular diet  DVT prophylaxis-ASA  Cont home meds  WBAT with PT-mobilization  Pain Control  Plan to D/C to home pending PT clearance and pain mgmt    Will update plan after PT    ALEE Jenkins

## 2021-12-29 NOTE — PROGRESS NOTES
Patient complaining of nausea, medications given and patient threw up. Patient requesting more medications. MD contacted and waiting on response. Patient refused to be compliant with mobility safety rules, refusing to use bed alarm. Patient informed of risk of getting up on own. Will continue to monitor.

## 2021-12-29 NOTE — DISCHARGE INSTRUCTIONS
Discharge Instructions Hip Replacement  Dr. Damon Garcia      Patient Name  Bon Friedman  Date of procedure  12/27/2021    Procedure  Procedure(s):  RIGHT TOTAL HIP ARTHROPLASTY ANTERIOR APPROACH  Surgeon  Surgeon(s) and Role:     * Merlin Barrett, MD - Primary  Date of discharge: No discharge date for patient encounter. PCP: Negrita Kwon NP    Follow up care   Follow up visit with Dr. Damon Garcia in 3 weeks. Call [phone]  to make an appointment    Activity at home   AVOID sudden and extreme movement of your hip (surgical leg)   Take a short walk every hour; except at night when sleeping   Do your Home Exercise Program 3 times every day    After exercising lie down and elevate your leg on pillows for 15-30 minutes to decrease swelling   Refer to your patient notebook for more information    Bathing and caring for your incision   You may take a shower with your waterproof dressing on your hip.  The waterproof dressing is to stay on your hip for 7 days.  On the 7th day have someone gently peel the dressing off by lifting the edge and stretching it to break the seal.   You may then leave your incision open to air unless you see drainage from your hip. Preventing blood clots   Take Aspirin every day as prescribed.  Call Dr. Damon Garcia if you have side effects of blood thinning medication: bleeding, bruising, upset stomach, or diarrhea.  Call Dr. Damon Garcia for signs of a blood clot in your leg: calf pain, tenderness, redness, swelling of lower leg    Preventing lung congestion   Use your incentive spirometer 4 times a day; do 10 repetitions each time   Remember to keep the small blue ball between the two arrows when taking a slow, deep breath           Pain Management   Get up and walk a short distance to relieve pain and stiffness.  Place ice wrap on your hip except when you are walking.  The gel ice packs should be changed about every 4 hours   Elevate your leg on pillows for 15-30 minutes    Take Tylenol 650mg (take two 325mg tablets) every 6 hours for pain.  If needed, take a narcotic pain pill every 4-6 hours as prescribed.  Take all medications with a small amount of food.  As your pain decreases, take the narcotics less often or take ½ of a pill   Call Dr. Kenn Funk if you have side effects from your narcotic pain medication: itching, drowsiness, dizziness, upset stomach, dry mouth, constipation or if you medication is not relieving your pain. Diet after surgery   You may resume your normal diet. Include vegetables, fruit, whole grains, lean meats, and low-fat dairy products. Eat food high in fiber    Drink plenty of fluids, including 8 cups of water daily   Take Senokot-s twice a day to prevent constipation    Avoid after surgery   Do not take any over-the-counter medication for pain except Tylenol   Do not take more than 3000mg (3 grams) of Tylenol in 24 hours.  Do not drink alcoholic beverages   Do not smoke   Do not drive until seen for follow up appointment   Do not place frozen gel pack directly on your skin. It can cause frostbite.  Do not take a tub bath, swim or get in a hot tub for 6 weeks  Prevention of falls and safety at home   Set up an area where you can rest comfortably leaving space around furniture to allow you to walk with your walker   Keep stairs, hallways and bathrooms well lit; especially at night   Arrange for care for your pets   Keep your home free of clutter        Call Dr. Kenn Funk at [phone] for:   Pain that is not relieved by pain medication, ice and activity   Side effects of medications   Increased/spread of bruising   Warning signs of infection:  ? persistent fever greater than 100 degrees  ?  shaking or chills  ? increased redness, tenderness, swelling or drainage from incision  ? increased pain during activity or rest   Warning signs of a blood clot in your leg:  ? increased pain in your calf  ? tenderness or redness  ? increased swelling or knee, calf, ankle or foot    Call [phone] after 5pm or on a weekend.  The on call physician will return your phone call      Call your Primary Care Doctor for:    Concerns about your medical conditions such as diabetes, high blood pressure, asthma, congestive heart failure   Blood sugars greater than 180   Persistent headache or dizziness   Coughing or congestion   Constipation or diarrhea   Burning when you go to the bathroom   Abnormal heart rate (fast or slow)      Call 911 and go to the nearest hospital for:    Sudden increased shortness of breath   Sudden onset of chest pain   Difficulty breathing   Localized chest pain with coughing or taking a deep breath

## 2021-12-29 NOTE — PROGRESS NOTES
Occupational Therapy:    Chart reviewed and attempted to see for OT session, however patient initially nauseous and deferring, later receiving pain meds and continuing to defer. Patient with very supportive family and friends who plan on assisting with ADL/mobility PRN at home. Will continue to follow up and attempt as able for OT evaluation.      Paola Quick, OT

## 2021-12-29 NOTE — PROGRESS NOTES
I have reviewed discharge instructions with the patient. The patient verbalized understanding. Discharge paperwork provided and reviewed. Discharge video watched. No further questions at this time. Patient being sent home with belongings.

## 2021-12-29 NOTE — PROGRESS NOTES
TRANSFER - IN REPORT:    Verbal report received from Robin (name) on Neftali Vega  being received from PACU (unit) for routine progression of care      Report consisted of patients Situation, Background, Assessment and   Recommendations(SBAR). Information from the following report(s) SBAR was reviewed with the receiving nurse. Opportunity for questions and clarification was provided. Assessment completed upon patients arrival to unit and care assumed.

## 2022-01-07 DIAGNOSIS — Z96.641 S/P TOTAL RIGHT HIP ARTHROPLASTY: Primary | ICD-10-CM

## 2022-01-07 RX ORDER — HYDROMORPHONE HYDROCHLORIDE 2 MG/1
2 TABLET ORAL
Qty: 40 TABLET | Refills: 0 | Status: SHIPPED | OUTPATIENT
Start: 2022-01-07 | End: 2022-01-17

## 2022-01-12 ENCOUNTER — OFFICE VISIT (OUTPATIENT)
Dept: ORTHOPEDIC SURGERY | Age: 32
End: 2022-01-12

## 2022-01-12 VITALS — BODY MASS INDEX: 32.78 KG/M2 | WEIGHT: 192 LBS | HEIGHT: 64 IN

## 2022-01-12 DIAGNOSIS — Z96.641 STATUS POST TOTAL REPLACEMENT OF RIGHT HIP: Primary | ICD-10-CM

## 2022-01-12 PROCEDURE — 99024 POSTOP FOLLOW-UP VISIT: CPT | Performed by: PHYSICIAN ASSISTANT

## 2022-01-12 RX ORDER — CELECOXIB 200 MG/1
CAPSULE ORAL
COMMUNITY
Start: 2021-08-02

## 2022-01-12 RX ORDER — NALOXONE HYDROCHLORIDE 4 MG/.1ML
4 SPRAY NASAL
COMMUNITY

## 2022-01-12 RX ORDER — ACETAMINOPHEN 325 MG/1
650 TABLET ORAL
COMMUNITY
Start: 2021-06-22

## 2022-01-12 RX ORDER — CYCLOBENZAPRINE HCL 10 MG
TABLET ORAL
COMMUNITY
End: 2022-01-12 | Stop reason: ALTCHOICE

## 2022-01-12 RX ORDER — ERGOCALCIFEROL 1.25 MG/1
CAPSULE ORAL
COMMUNITY

## 2022-01-12 RX ORDER — HYDROXYZINE PAMOATE 25 MG/1
CAPSULE ORAL
COMMUNITY

## 2022-01-12 RX ORDER — DULOXETIN HYDROCHLORIDE 60 MG/1
30 CAPSULE, DELAYED RELEASE ORAL
COMMUNITY

## 2022-01-12 RX ORDER — HYDROCODONE BITARTRATE 60 MG/1
TABLET, EXTENDED RELEASE ORAL
COMMUNITY
Start: 2021-11-10

## 2022-01-12 RX ORDER — MORPHINE SULFATE 30 MG/1
CAPSULE, EXTENDED RELEASE ORAL
COMMUNITY

## 2022-01-12 RX ORDER — FUROSEMIDE 40 MG/1
TABLET ORAL
COMMUNITY

## 2022-01-12 RX ORDER — PROMETHAZINE HYDROCHLORIDE 25 MG/1
TABLET ORAL
COMMUNITY
Start: 2022-01-06

## 2022-01-12 RX ORDER — FAMOTIDINE 40 MG/1
TABLET, FILM COATED ORAL
COMMUNITY

## 2022-01-12 RX ORDER — HYDROCORTISONE 25 MG/G
CREAM TOPICAL
COMMUNITY

## 2022-01-12 RX ORDER — MIDAZOLAM HYDROCHLORIDE 2 MG/2ML
2 INJECTION, SOLUTION INTRAMUSCULAR; INTRAVENOUS
COMMUNITY

## 2022-01-12 RX ORDER — PREDNISONE 10 MG/1
TABLET ORAL
COMMUNITY

## 2022-01-12 NOTE — PROGRESS NOTES
Noreen Roberts (: 1990) is a 32 y.o. female, established patient, here for evaluation of the following chief complaint(s):  Surgical Follow-up       SUBJECTIVE/OBJECTIVE:  Noreen Roberts (: 1990) is a 32 y.o. female. Right Total Hip Arthroplasty Anterior Approach - Right 2021     The patient is doing well at this time and is pleased with the results of her surgery and the progress which she has made to date. Patient states that her operative hip actually hurts less than her other joints. Allergies   Allergen Reactions    Ceclor Cd Hives    Ceclor [Cefaclor] Hives    Fish Containing Products Hives     Catfish only        Current Outpatient Medications   Medication Sig    acetaminophen (TYLENOL) 325 mg tablet Take 650 mg by mouth.  celecoxib (CELEBREX) 200 mg capsule     predniSONE (STERAPRED DS) 10 mg dose pack prednisone 10 mg tablets in a dose pack   TAKE 6 TABLETS BY MOUTH ON DAY 1, 5 TABLETS ON DAY 2, 4 TABLETS ON DAY 3, 3 TABLETS ON DAY 4, 2 TABLETS ON DAY 5, 1 TABLET ON DAY 6    prenatal vit106-iron-folic-om3 25 mg iron-1 mg -400 mg cmpk prenat vit 220-NSEK 25 mg-folic acid 1 mg-om3 103 mg-dha-epa oral pack    DULoxetine (CYMBALTA) 60 mg capsule duloxetine 60 mg capsule,delayed release    famotidine (PEPCID) 40 mg tablet famotidine 40 mg tablet   Take 1 tablet every day by oral route.  ergocalciferol (ERGOCALCIFEROL) 1,250 mcg (50,000 unit) capsule ergocalciferol (vitamin D2) 1,250 mcg (50,000 unit) capsule   1 po once per week    furosemide (LASIX) 40 mg tablet furosemide 40 mg tablet   TAKE ONE TABLET BY MOUTH DAILY AS NEEDED    Hysingla ER 60 mg ER tablet     hydrocortisone (ANUSOL-HC) 2.5 % rectal cream Anusol-HC 2.5 % rectal cream with applicator   Insert 1 applicatorful twice a day by rectal route.     hydrOXYzine pamoate (VISTARIL) 25 mg capsule hydroxyzine pamoate 25 mg capsule   TAKE ONE CAPSULE BY MOUTH Three times a day as needed for anxiety    morphine (AVINza) 30 mg SR capsule morphine ER 30 mg capsule,extended release 24 hr multiphase    midazolam HCl/PF (Midazolam, PF,) 2 mg/2 mL (1 mg/mL) syrg 2 mg.  naloxone (Narcan) 4 mg/actuation nasal spray 4 mg by Nasal route once as needed.  promethazine (PHENERGAN) 25 mg tablet     HYDROmorphone (Dilaudid) 2 mg tablet Take 1 Tablet by mouth every six (6) hours as needed for Pain for up to 10 days. Max Daily Amount: 8 mg.  aspirin delayed-release 325 mg tablet Take 1 Tablet by mouth two (2) times a day.  cyclobenzaprine (FLEXERIL) 10 mg tablet Take 10 mg by mouth three (3) times daily as needed for Muscle Spasm(s).  metoprolol succinate (TOPROL-XL) 25 mg XL tablet Take 25 mg by mouth daily.  OTHER TART CHERRY SUPPLEMENT 2 TABS DAILY (Patient not taking: Reported on 12/21/2021)    TURMERIC PO Take 2 Tablets by mouth daily.  OTHER MITOCHONDRIAL MAXIMIZER - 1 TAB BID (Patient not taking: Reported on 12/21/2021)    OTHER CBD AND THC - EDIBLES , USES THEM DAILY - PATIENT TOLD PER ANESTHESIA PROTOCOL , TO STOP 7 DAYS PRIOR TO SURGERY. (Patient not taking: Reported on 12/21/2021)    ALPRAZolam (Xanax XR) 1 mg XR tablet Take  by mouth every morning.  levOCARNitine Tartrate (L-CARNITINE, TARTRATE,) 500 mg cap Take 1,500 mg by mouth three (3) times daily.  PNV72-iron carb,glu-FA-dss-dha (CITRANATAL 90 DHA, ALGAL OIL,) 90 mg iron-1 mg -50 mg-300 mg cmpk daily. (Patient not taking: Reported on 12/21/2021)    gabapentin (NEURONTIN) 600 mg tablet Take 600 mg by mouth three (3) times daily.  ALPRAZolam (XANAX) 0.5 mg tablet Take 1 mg by mouth two (2) times a day.  DEXTROAMPHETAMINE/AMPHETAMINE (AMPHETAMINE SALT COMBO PO) Take 15 mg by mouth two (2) times a day.  folic acid (FOLVITE) 1 mg tablet Take 2 mg by mouth daily. No current facility-administered medications for this visit.        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 [place]   Tobacco Use    Smoking status: Never Smoker    Smokeless tobacco: Never Used   Substance and Sexual Activity    Alcohol use: No     Alcohol/week: 2.5 standard drinks     Types: 3 Standard drinks or equivalent per week    Drug use: No    Sexual activity: Not Currently   Other Topics Concern    Not on file   Social History Narrative    ** Merged History Encounter **          Social Determinants of Health     Financial Resource Strain:     Difficulty of Paying Living Expenses: Not on file   Food Insecurity:     Worried About Running Out of Food in the Last Year: Not on file    Abi of Food in the Last Year: Not on file   Transportation Needs:     Lack of Transportation (Medical): Not on file    Lack of Transportation (Non-Medical):  Not on file   Physical Activity:     Days of Exercise per Week: Not on file    Minutes of Exercise per Session: Not on file   Stress:     Feeling of Stress : Not on file   Social Connections:     Frequency of Communication with Friends and Family: Not on file    Frequency of Social Gatherings with Friends and Family: Not on file    Attends Protestant Services: Not on file    Active Member of [address] [place] [address] or Organizations: Not on file    Attends Club or Organization Meetings: Not on file    Marital Status: Not on file   Intimate Partner Violence:     Fear of Current or Ex-Partner: Not on file    Emotionally Abused: Not on file    Physically Abused: Not on file    Sexually Abused: Not on file   Housing Stability:     Unable to Pay for Housing in the Last Year: Not on file    Number of [place] in the Last Year: Not on file    Unstable Housing in the Last Year: Not on file       Past Surgical History:   Procedure Laterality Date    HX ACL RECONSTRUCTION 2008    field hockey injury    HX DILATION AND EVACUATION      2016     HX LUMBAR FUSION  2017    L4L5 LUMBAR FUSION WITH LAMINECTOMY     HX ORTHOPAEDIC      ACL REPAIR    HX ORTHOPAEDIC  2021    RIGHT LABRAL HIP REPAIR     HX SKIN BIOPSY      MUSCLE BIOPSY X 2    NEUROLOGICAL PROCEDURE UNLISTED  2020    CERVICAL DISCKECTOMY  C3, C4, C5  [facility]    [place]    [place] MUSCLE BIOPSY  ~    [place]       Family History   Problem Relation Age of Onset    Diabetes Father     Heart Disease Father     Diabetes Mother    Robertha Rumps Arthritis-rheumatoid Mother     Heart Disease Mother    Robertha Rumps Arthritis-rheumatoid Sister     Diabetes Paternal Grandmother     Diabetes Paternal Grandfather     Diabetes Maternal Grandmother     Breast Cancer Other         1 great aunts        OB History        1    Para   0    Term   0       0    AB   0    Living           SAB   0    IAB   0    Ectopic   0    Molar        Multiple        Live Births                       REVIEW OF SYSTEMS:  Patient denies any recent fever, chills, nausea, vomiting, chest pain, or shortness of breath. Vitals:  Ht 5' 4\" (1.626 m)   Wt 192 lb (87.1 kg)   BMI 32.96 kg/m²    Body mass index is 32.96 kg/m². PHYSICAL EXAM:  Patient is alert and oriented x3 and in no acute distress. The postoperative wound is well-healed without erythema or drainage. Patient has pain-free range of motion of the operative hip. There is no calf tenderness to palpation. Distal motor and sensation is intact. IMAGING:    Results from Hospital Encounter encounter on 21    XR HIP RT W OR WO PELV  1 VW    Narrative  INDICATION: Compliance only, right hip prosthesis. Impression  Single AP view of the right hip demonstrates a hip prosthesis in  gross anatomic alignment and without evidence of orthopedic hardware  complication.        AP, lateral, and sunrise views digital radiographs of the operative (left/right) knee obtained in the office today were reviewed with the patient and demonstrate anatomic alignment of components with no evidence of hardware loosening or subsidence. Orders Placed This Encounter    XR HIP RT W OR WO PELV 2-3 VWS     Room 1c     Standing Status:   Future     Number of Occurrences:   1     Standing Expiration Date:   1/13/2023     Order Specific Question:   Is Patient Pregnant? Answer:   Unknown          ASSESSMENT/PLAN:      1. Status post total replacement of right hip  -     XR HIP RT W OR WO PELV 2-3 VWS; Future        Below is the assessment and plan developed based on review of pertinent history, physical exam, labs, studies, and medications. Discussed radiographic findings and have answered all patient questions to her satisfaction. The patient is to continue DVT prophylaxis for 3 more weeks, then discontinue. Patient states he will continue to perform home exercises as taught to her by her partner who is a physical therapist.. Have asked the patient to follow up in 8 weeks time for reevaluation with Dr Ernestine Cerda. The patient was asked to contact the office with any questions or concerns. The patient understands and agrees to the treatment plan as outlined above. Return in about 2 months (around 3/12/2022) for post op follow up. Dr. Ernestine Cerda was available for immediate consultation as needed. An electronic signature was used to authenticate this note.   -- Alicia Hathaway PA-C

## 2022-02-03 ENCOUNTER — TRANSCRIBE ORDER (OUTPATIENT)
Dept: SCHEDULING | Age: 32
End: 2022-02-03

## 2022-02-03 DIAGNOSIS — R60.0 LOCALIZED EDEMA: Primary | ICD-10-CM

## 2022-02-04 ENCOUNTER — TRANSCRIBE ORDER (OUTPATIENT)
Dept: SCHEDULING | Age: 32
End: 2022-02-04

## 2022-02-04 DIAGNOSIS — R60.0 LOCALIZED EDEMA: Primary | ICD-10-CM

## 2022-02-10 ENCOUNTER — HOSPITAL ENCOUNTER (OUTPATIENT)
Dept: VASCULAR SURGERY | Age: 32
Discharge: HOME | End: 2022-02-10
Attending: NURSE PRACTITIONER
Payer: MEDICARE

## 2022-02-10 DIAGNOSIS — R60.0 LOCALIZED EDEMA: ICD-10-CM

## 2022-02-10 PROCEDURE — 93971 EXTREMITY STUDY: CPT

## 2022-02-18 ENCOUNTER — HOSPITAL ENCOUNTER (EMERGENCY)
Age: 32
Discharge: LEFT BEFORE DISCHARGE | End: 2022-02-18
Attending: EMERGENCY MEDICINE
Payer: MEDICARE

## 2022-02-18 VITALS
SYSTOLIC BLOOD PRESSURE: 142 MMHG | HEART RATE: 135 BPM | DIASTOLIC BLOOD PRESSURE: 83 MMHG | WEIGHT: 190 LBS | TEMPERATURE: 98.4 F | OXYGEN SATURATION: 96 % | HEIGHT: 64 IN | BODY MASS INDEX: 32.44 KG/M2 | RESPIRATION RATE: 16 BRPM

## 2022-02-18 LAB
ALBUMIN SERPL-MCNC: 4 G/DL (ref 3.5–5)
ALBUMIN/GLOB SERPL: 1.1 {RATIO} (ref 1.1–2.2)
ALP SERPL-CCNC: 62 U/L (ref 45–117)
ALT SERPL-CCNC: 28 U/L (ref 12–78)
ANION GAP SERPL CALC-SCNC: 4 MMOL/L (ref 5–15)
AST SERPL-CCNC: 12 U/L (ref 15–37)
ATRIAL RATE: 114 BPM
BASOPHILS # BLD: 0 K/UL (ref 0–0.1)
BASOPHILS NFR BLD: 0 % (ref 0–1)
BILIRUB SERPL-MCNC: 0.1 MG/DL (ref 0.2–1)
BUN SERPL-MCNC: 15 MG/DL (ref 6–20)
BUN/CREAT SERPL: 18 (ref 12–20)
CALCIUM SERPL-MCNC: 9.3 MG/DL (ref 8.5–10.1)
CALCULATED P AXIS, ECG09: -4 DEGREES
CALCULATED R AXIS, ECG10: -6 DEGREES
CALCULATED T AXIS, ECG11: 9 DEGREES
CHLORIDE SERPL-SCNC: 102 MMOL/L (ref 97–108)
CO2 SERPL-SCNC: 29 MMOL/L (ref 21–32)
COMMENT, HOLDF: NORMAL
CREAT SERPL-MCNC: 0.83 MG/DL (ref 0.55–1.02)
DIAGNOSIS, 93000: NORMAL
DIFFERENTIAL METHOD BLD: ABNORMAL
EOSINOPHIL # BLD: 0.9 K/UL (ref 0–0.4)
EOSINOPHIL NFR BLD: 7 % (ref 0–7)
ERYTHROCYTE [DISTWIDTH] IN BLOOD BY AUTOMATED COUNT: 13.1 % (ref 11.5–14.5)
GLOBULIN SER CALC-MCNC: 3.7 G/DL (ref 2–4)
GLUCOSE SERPL-MCNC: 109 MG/DL (ref 65–100)
HCT VFR BLD AUTO: 40.9 % (ref 35–47)
HGB BLD-MCNC: 13 G/DL (ref 11.5–16)
IMM GRANULOCYTES # BLD AUTO: 0 K/UL
IMM GRANULOCYTES NFR BLD AUTO: 0 %
LYMPHOCYTES # BLD: 6.3 K/UL (ref 0.8–3.5)
LYMPHOCYTES NFR BLD: 48 % (ref 12–49)
MCH RBC QN AUTO: 28.3 PG (ref 26–34)
MCHC RBC AUTO-ENTMCNC: 31.8 G/DL (ref 30–36.5)
MCV RBC AUTO: 89.1 FL (ref 80–99)
MONOCYTES # BLD: 1.1 K/UL (ref 0–1)
MONOCYTES NFR BLD: 8 % (ref 5–13)
NEUTS SEG # BLD: 4.9 K/UL (ref 1.8–8)
NEUTS SEG NFR BLD: 37 % (ref 32–75)
NRBC # BLD: 0 K/UL (ref 0–0.01)
NRBC BLD-RTO: 0 PER 100 WBC
P-R INTERVAL, ECG05: 128 MS
PLATELET # BLD AUTO: 417 K/UL (ref 150–400)
PMV BLD AUTO: 10.4 FL (ref 8.9–12.9)
POTASSIUM SERPL-SCNC: 3.9 MMOL/L (ref 3.5–5.1)
PROT SERPL-MCNC: 7.7 G/DL (ref 6.4–8.2)
Q-T INTERVAL, ECG07: 310 MS
QRS DURATION, ECG06: 86 MS
QTC CALCULATION (BEZET), ECG08: 427 MS
RBC # BLD AUTO: 4.59 M/UL (ref 3.8–5.2)
RBC MORPH BLD: ABNORMAL
SAMPLES BEING HELD,HOLD: NORMAL
SODIUM SERPL-SCNC: 135 MMOL/L (ref 136–145)
VENTRICULAR RATE, ECG03: 114 BPM
WBC # BLD AUTO: 13.2 K/UL (ref 3.6–11)
WBC MORPH BLD: ABNORMAL

## 2022-02-18 PROCEDURE — 80053 COMPREHEN METABOLIC PANEL: CPT

## 2022-02-18 PROCEDURE — 75810000275 HC EMERGENCY DEPT VISIT NO LEVEL OF CARE

## 2022-02-18 PROCEDURE — 36415 COLL VENOUS BLD VENIPUNCTURE: CPT

## 2022-02-18 PROCEDURE — 93005 ELECTROCARDIOGRAM TRACING: CPT

## 2022-02-18 PROCEDURE — 85025 COMPLETE CBC W/AUTO DIFF WBC: CPT

## 2022-02-18 NOTE — ED TRIAGE NOTES
C/o rectal bleeding x1 week with clots. Also c/o BLE edema. Was taking lasix which helped some. C/o lower abdominal pain.

## 2022-03-02 ENCOUNTER — ANESTHESIA EVENT (OUTPATIENT)
Dept: ENDOSCOPY | Age: 32
End: 2022-03-02
Payer: MEDICARE

## 2022-03-02 ENCOUNTER — HOSPITAL ENCOUNTER (OUTPATIENT)
Age: 32
Setting detail: OUTPATIENT SURGERY
Discharge: HOME | End: 2022-03-02
Attending: SPECIALIST | Admitting: SPECIALIST
Payer: MEDICARE

## 2022-03-02 ENCOUNTER — ANESTHESIA (OUTPATIENT)
Dept: ENDOSCOPY | Age: 32
End: 2022-03-02
Payer: MEDICARE

## 2022-03-02 VITALS
DIASTOLIC BLOOD PRESSURE: 66 MMHG | TEMPERATURE: 98 F | HEART RATE: 83 BPM | RESPIRATION RATE: 15 BRPM | OXYGEN SATURATION: 100 % | HEIGHT: 64 IN | SYSTOLIC BLOOD PRESSURE: 112 MMHG | BODY MASS INDEX: 32.39 KG/M2 | WEIGHT: 189.7 LBS

## 2022-03-02 LAB
COVID-19 RAPID TEST, COVR: NOT DETECTED
HCG UR QL: NEGATIVE
SOURCE, COVRS: NORMAL

## 2022-03-02 PROCEDURE — 76040000007: Performed by: SPECIALIST

## 2022-03-02 PROCEDURE — 77030019988 HC FCPS ENDOSC DISP BSC -B: Performed by: SPECIALIST

## 2022-03-02 PROCEDURE — 81025 URINE PREGNANCY TEST: CPT

## 2022-03-02 PROCEDURE — 76060000032 HC ANESTHESIA 0.5 TO 1 HR: Performed by: SPECIALIST

## 2022-03-02 PROCEDURE — 74011000250 HC RX REV CODE- 250: Performed by: NURSE ANESTHETIST, CERTIFIED REGISTERED

## 2022-03-02 PROCEDURE — 74011250636 HC RX REV CODE- 250/636: Performed by: NURSE ANESTHETIST, CERTIFIED REGISTERED

## 2022-03-02 PROCEDURE — 74011250636 HC RX REV CODE- 250/636: Performed by: SPECIALIST

## 2022-03-02 PROCEDURE — 77030039825 HC MSK NSL PAP SUPERNO2VA VYRM -B: Performed by: NURSE ANESTHETIST, CERTIFIED REGISTERED

## 2022-03-02 PROCEDURE — 88305 TISSUE EXAM BY PATHOLOGIST: CPT

## 2022-03-02 PROCEDURE — 87635 SARS-COV-2 COVID-19 AMP PRB: CPT

## 2022-03-02 PROCEDURE — 2709999900 HC NON-CHARGEABLE SUPPLY: Performed by: SPECIALIST

## 2022-03-02 RX ORDER — SODIUM CHLORIDE 9 MG/ML
50 INJECTION, SOLUTION INTRAVENOUS CONTINUOUS
Status: DISCONTINUED | OUTPATIENT
Start: 2022-03-02 | End: 2022-03-02 | Stop reason: HOSPADM

## 2022-03-02 RX ORDER — SODIUM CHLORIDE 0.9 % (FLUSH) 0.9 %
5-40 SYRINGE (ML) INJECTION AS NEEDED
Status: DISCONTINUED | OUTPATIENT
Start: 2022-03-02 | End: 2022-03-02 | Stop reason: HOSPADM

## 2022-03-02 RX ORDER — DEXTROMETHORPHAN/PSEUDOEPHED 2.5-7.5/.8
1.2 DROPS ORAL
Status: DISCONTINUED | OUTPATIENT
Start: 2022-03-02 | End: 2022-03-02 | Stop reason: HOSPADM

## 2022-03-02 RX ORDER — PHENYLEPHRINE HCL IN 0.9% NACL 0.4MG/10ML
SYRINGE (ML) INTRAVENOUS AS NEEDED
Status: DISCONTINUED | OUTPATIENT
Start: 2022-03-02 | End: 2022-03-02 | Stop reason: HOSPADM

## 2022-03-02 RX ORDER — PROPOFOL 10 MG/ML
INJECTION, EMULSION INTRAVENOUS AS NEEDED
Status: DISCONTINUED | OUTPATIENT
Start: 2022-03-02 | End: 2022-03-02 | Stop reason: HOSPADM

## 2022-03-02 RX ORDER — LIDOCAINE HYDROCHLORIDE 20 MG/ML
INJECTION, SOLUTION EPIDURAL; INFILTRATION; INTRACAUDAL; PERINEURAL AS NEEDED
Status: DISCONTINUED | OUTPATIENT
Start: 2022-03-02 | End: 2022-03-02 | Stop reason: HOSPADM

## 2022-03-02 RX ORDER — SODIUM CHLORIDE 0.9 % (FLUSH) 0.9 %
5-40 SYRINGE (ML) INJECTION EVERY 8 HOURS
Status: DISCONTINUED | OUTPATIENT
Start: 2022-03-02 | End: 2022-03-02 | Stop reason: HOSPADM

## 2022-03-02 RX ORDER — SODIUM CHLORIDE 9 MG/ML
INJECTION, SOLUTION INTRAVENOUS
Status: DISCONTINUED | OUTPATIENT
Start: 2022-03-02 | End: 2022-03-02 | Stop reason: HOSPADM

## 2022-03-02 RX ADMIN — LIDOCAINE HYDROCHLORIDE 80 MG: 20 INJECTION, SOLUTION EPIDURAL; INFILTRATION; INTRACAUDAL; PERINEURAL at 10:47

## 2022-03-02 RX ADMIN — Medication 80 MCG: at 11:04

## 2022-03-02 RX ADMIN — SODIUM CHLORIDE: 0.9 INJECTION, SOLUTION INTRAVENOUS at 10:58

## 2022-03-02 RX ADMIN — PROPOFOL 50 MG: 10 INJECTION, EMULSION INTRAVENOUS at 11:05

## 2022-03-02 RX ADMIN — Medication 80 MCG: at 11:10

## 2022-03-02 RX ADMIN — PROPOFOL 50 MG: 10 INJECTION, EMULSION INTRAVENOUS at 10:52

## 2022-03-02 RX ADMIN — PROPOFOL 50 MG: 10 INJECTION, EMULSION INTRAVENOUS at 11:00

## 2022-03-02 RX ADMIN — PROPOFOL 100 MG: 10 INJECTION, EMULSION INTRAVENOUS at 10:48

## 2022-03-02 RX ADMIN — PROPOFOL 50 MG: 10 INJECTION, EMULSION INTRAVENOUS at 10:56

## 2022-03-02 RX ADMIN — SODIUM CHLORIDE 50 ML/HR: 9 INJECTION, SOLUTION INTRAVENOUS at 10:39

## 2022-03-02 RX ADMIN — Medication 80 MCG: at 11:07

## 2022-03-02 NOTE — DISCHARGE INSTRUCTIONS
Rony Buck  [id number]  1990    COLON / EGD DISCHARGE INSTRUCTIONS  Discomfort:  Sore throat- throat lozenges or warm salt water gargle  Redness at IV site- apply warm compress to area; if redness or soreness persist- contact your physician  There may be a slight amount of blood passed from the rectum  Gaseous discomfort- walking, belching will help relieve any discomfort  You may not operate a vehicle for 12 hours  You may not engage in an occupation involving machinery or appliances for rest of today  You may not drink alcoholic beverages for at least 12 hours  Avoid making any critical decisions for at least 24 hour  DIET:   Regular diet. - however -  remember your colon is empty and a heavy meal will produce gas. Avoid these foods:  vegetables, fried / greasy foods, carbonated drinks for today     ACTIVITY:  You may  resume your normal daily activities it is recommended that you spend the remainder of the day resting -  avoid any strenuous activity. CALL M.D. ANY SIGN OF:   Increasing pain, nausea, vomiting  Abdominal distension (swelling)  New increased bleeding (oral or rectal)  Fever (chills)  Pain in chest area  Bloody discharge from nose or mouth  Shortness of breathTylenol as needed for pain.       Follow-up Instructions:   Call Dr. Everett Mendieta for results of procedure / biopsy in 7 days at telephone #  [phone]                  Rony Buck  [id number]  1990        DISCHARGE SUMMARY from Nurse    The following personal items collected during your admission are returned to you:   Dental Appliance: Dental Appliances: None  Vision: Visual Aid: None  Hearing Aid:    Jewelry:    Clothing:    Other Valuables:    Valuables sent to safe:      PATIENT INSTRUCTIONS:    Take Home Medications:

## 2022-03-02 NOTE — ROUTINE PROCESS
Nan Neda  1990  [id number]    Situation:  Verbal report received from: Margot  Procedure: Procedure(s):  COLONOSCOPY  needs rapid covid  ESOPHAGOGASTRODUODENOSCOPY (EGD)  ESOPHAGOGASTRODUODENAL (EGD) BIOPSY    Background:    Preoperative diagnosis: HEMATOCHEZIA/LOWER ABDOMINAL PAIN  Postoperative diagnosis: EGD- Gastritis  Colon- Internal hemorrhoids    :  Dr. Marquis Van  Assistant(s): Endoscopy Technician-1: Nilo Jordan  Endoscopy RN-1: Xander Ovalle RN    Specimens:   ID Type Source Tests Collected by Time Destination   1 : Stomach biopsy Preservative   Noy Rivas MD 3/2/2022 1054 Pathology   2 : Duodenal biopsy Preservative   Noy Rivas MD 3/2/2022 1054 Pathology     H. Pylori  no    Assessment:  Intra-procedure medications       Anesthesia gave intra-procedure sedation and medications, see anesthesia flow sheet yes    Intravenous fluids: NS@ KVO     Vital signs stable yes    Abdominal assessment: round and soft yes    Recommendation:

## 2022-03-02 NOTE — ANESTHESIA PREPROCEDURE EVALUATION
Relevant Problems   NEUROLOGY   (+) ADHD (attention deficit hyperactivity disorder)      CARDIOVASCULAR   (+) HTN (hypertension)       Anesthetic History               Review of Systems / Medical History  Patient summary reviewed, nursing notes reviewed and pertinent labs reviewed    Pulmonary                   Neuro/Psych         Psychiatric history     Cardiovascular    Hypertension        Dysrhythmias            GI/Hepatic/Renal     GERD           Endo/Other        Obesity and arthritis     Other Findings   Comments: Mitochondrial Disorder pt and mother states that she can have propofol  High pain/anxiety            Physical Exam    Airway  Mallampati: I  TM Distance: > 6 cm  Neck ROM: normal range of motion   Mouth opening: Normal     Cardiovascular    Rhythm: regular           Dental  No notable dental hx       Pulmonary  Breath sounds clear to auscultation               Abdominal         Other Findings            Anesthetic Plan    ASA: 3  Anesthesia type: general and total IV anesthesia          Induction: Intravenous  Anesthetic plan and risks discussed with: Patient      Propofol MAC

## 2022-03-02 NOTE — PROCEDURES
Colonoscopy Procedure Note    Indications:   Hematochezia    Referring Physician: Lorrie Ibarra NP  Anesthesia/Sedation: MAC anesthesia Propofol  Endoscopist:  Dr. Luis Chavira    Procedure in Detail:  Informed consent was obtained for the procedure, including sedation. Risks of perforation, hemorrhage, adverse drug reaction, and aspiration were discussed. The patient was placed in the left lateral decubitus position. Based on the pre-procedure assessment, including review of the patient's medical history, medications, allergies, and review of systems, she had been deemed to be an appropriate candidate for moderate sedation; she was therefore sedated with the medications listed above. The patient was monitored continuously with ECG tracing, pulse oximetry, blood pressure monitoring, and direct observations. A rectal examination was performed. The [id number] was inserted into the rectum and advanced under direct vision to the cecum, which was identified by the ileocecal valve and appendiceal orifice. The quality of the colonic preparation was adequate. A careful inspection was made as the colonoscope was withdrawn, including a retroflexed view of the rectum; findings and interventions are described below. Appropriate photodocumentation was obtained. Findings:   1. Scope advanced to the cecum. 2.  Overall preparation was adequate with some scattered stool in the cecum and ascending colon. 3.  No polyps seen. 4.  Normal mucosa throughout. 5.  Grade 1 nonbleeding internal hemorrhoids. Therapies:  none    Specimen:  none     Complications: None were encountered during the procedure. EBL: < 10 ml.     Recommendations:   -fiber enhanced diet  -treatment for chronic constipation  -repeat colonoscopy at age 39 for screening    Signed By: Hayley Silver MD                        March 2, 2022

## 2022-03-02 NOTE — H&P
Gastroenterology Outpatient History and Physical    Patient: Diane Riveraronald    Physician: Pedro Luis Menchaca MD    Vital Signs: Height 5' 4\" (1.626 m), weight 86 kg (189 lb 11.2 oz), last menstrual period 02/12/2022, not currently breastfeeding. Allergies: Allergies   Allergen Reactions    Ceclor Cd Hives    Ceclor [Cefaclor] Hives    Fish Containing Products Hives     Catfish only        Chief Complaint: Abdominal pain, Hematochezia    History of Present Illness: 33 yo WF for EGD/Colonoscopy for sxs of hematochezia and abdominal pain. Justification for Procedure: above    History:  Past Medical History:   Diagnosis Date    ADHD (attention deficit hyperactivity disorder) 2009    dx Dr. Gary Priest in [place]    Adverse effect of anesthesia     [address], NOTE ABOUT WHAT ANESTHESIA WORKS BEST FOR HER MITOCHONDRIAL DISEASE.  Arrhythmia     TACHYCARDIA , HISTORY OF AFIB - SEES DR. BONILLA      Arthritis     Carnitine deficiency (HCC)     Chronic pain     from myositis, myopathy    Mg-Danlos syndrome     FH: rheumatoid arthritis 10/1/2013    Fibromyalgia     GERD (gastroesophageal reflux disease)     HSP (Henoch Schonlein purpura) ([address] 12/24/2014    occurs w myopathy flares    Mitochondrial disease ([address]     Muscle pain     Muscle weakness     Myopathy 2005    Dr. Stanford Francis, Dr. Deondre Martinez, genetic/metabolic? mitochondrial disorder,  [facility] muscle biopsy    Tiredness     VSD (ventricular septal defect)     spontaneous closure      Past Surgical History:   Procedure Laterality Date    HX ACL RECONSTRUCTION  2008    field hockey injury    HX DILATION AND EVACUATION      2016     HX LUMBAR FUSION  06/2017    L4L5 LUMBAR FUSION WITH LAMINECTOMY     HX ORTHOPAEDIC      ACL REPAIR    HX ORTHOPAEDIC  03/2021    RIGHT LABRAL HIP REPAIR     HX SKIN BIOPSY      MUSCLE BIOPSY X 2    NEUROLOGICAL PROCEDURE UNLISTED  03/2020    CERVICAL DISCKECTOMY  C3, C4, C5 AT [facility]    [place]    [place] MUSCLE BIOPSY  ~2007    [facility]      Social History     Socioeconomic History    Marital status: LEGALLY     Number of children: 0   Occupational History    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 [employer]   Tobacco Use    Smoking status: Never Smoker    Smokeless tobacco: Never Used   Vaping Use    Vaping Use: Never used   Substance and Sexual Activity    Alcohol use: No     Alcohol/week: 2.5 standard drinks     Types: 3 Standard drinks or equivalent per week    Drug use: No    Sexual activity: Not Currently   Social History Narrative    ** Merged History Encounter **           Family History   Problem Relation Age of Onset    Diabetes Father     Heart Disease Father     Diabetes Mother    Dolly [place] Arthritis-rheumatoid Mother     Heart Disease Mother    Dollynahed Saxena Arthritis-rheumatoid Sister     Diabetes Paternal Grandmother     Diabetes Paternal Grandfather     Diabetes Maternal Grandmother     Breast Cancer Other         1 great aunts       Medications:   Prior to Admission medications    Medication Sig Start Date End Date Taking?  Authorizing Provider   predniSONE (STERAPRED DS) 10 mg dose pack prednisone 10 mg tablets in a dose pack   TAKE 6 TABLETS BY MOUTH ON DAY 1, 5 TABLETS ON DAY 2, 4 TABLETS ON DAY 3, 3 TABLETS ON DAY 4, 2 TABLETS ON DAY 5, 1 TABLET ON DAY 6   Yes Provider, Historical   prenatal vit106-iron-folic-om3 25 mg iron-1 mg -400 mg cmpk prenat vit 221-ZFZI 25 mg-folic acid 1 mg-om3 177 mg-dha-epa oral pack   Yes Provider, Historical   acetaminophen (TYLENOL) 325 mg tablet Take 650 mg by mouth. 6/22/21  Yes Provider, Historical   celecoxib (CELEBREX) 200 mg capsule  8/2/21  Yes Provider, Historical   DULoxetine (CYMBALTA) 60 mg capsule duloxetine 60 mg capsule,delayed release   Yes Provider, Historical   famotidine (PEPCID) 40 mg tablet famotidine 40 mg tablet   Take 1 tablet every day by oral route. Yes Provider, Historical   ergocalciferol (ERGOCALCIFEROL) 1,250 mcg (50,000 unit) capsule ergocalciferol (vitamin D2) 1,250 mcg (50,000 unit) capsule   1 po once per week   Yes Provider, Historical   furosemide (LASIX) 40 mg tablet furosemide 40 mg tablet   TAKE ONE TABLET BY MOUTH DAILY AS NEEDED   Yes Provider, Historical   Hysingla ER 60 mg ER tablet  11/10/21  Yes Provider, Historical   hydrocortisone (ANUSOL-HC) 2.5 % rectal cream Anusol-HC 2.5 % rectal cream with applicator   Insert 1 applicatorful twice a day by rectal route. Yes Provider, Historical   hydrOXYzine pamoate (VISTARIL) 25 mg capsule hydroxyzine pamoate 25 mg capsule   TAKE ONE CAPSULE BY MOUTH Three times a day as needed for anxiety   Yes Provider, Historical   morphine (AVINza) 30 mg SR capsule morphine ER 30 mg capsule,extended release 24 hr multiphase   Yes Provider, Historical   midazolam HCl/PF (Midazolam, PF,) 2 mg/2 mL (1 mg/mL) syrg 2 mg. Yes Provider, Historical   naloxone (Narcan) 4 mg/actuation nasal spray 4 mg by Nasal route once as needed. Yes Provider, Historical   promethazine (PHENERGAN) 25 mg tablet  1/6/22  Yes Provider, Historical   aspirin delayed-release 325 mg tablet Take 1 Tablet by mouth two (2) times a day. 12/29/21  Yes Chyna Mooney MD   cyclobenzaprine (FLEXERIL) 10 mg tablet Take 10 mg by mouth three (3) times daily as needed for Muscle Spasm(s). Yes Provider, Historical   metoprolol succinate (TOPROL-XL) 25 mg XL tablet Take 25 mg by mouth daily. Yes Provider, Historical   OTHER TART CHERRY SUPPLEMENT 2 TABS DAILY    Yes Provider, Historical   TURMERIC PO Take 2 Tablets by mouth daily. Yes Provider, Historical   OTHER MITOCHONDRIAL MAXIMIZER - 1 TAB BID    Yes Provider, Historical   ALPRAZolam (Xanax XR) 1 mg XR tablet Take  by mouth every morning. Yes Provider, Historical   levOCARNitine Tartrate (L-CARNITINE, TARTRATE,) 500 mg cap Take 1,500 mg by mouth three (3) times daily. Yes Other, MD Les   gabapentin (NEURONTIN) 600 mg tablet Take 600 mg by mouth three (3) times daily. Yes Provider, Historical   ALPRAZolam (XANAX) 0.5 mg tablet Take 1 mg by mouth two (2) times a day. Yes Provider, Historical   DEXTROAMPHETAMINE/AMPHETAMINE (AMPHETAMINE SALT COMBO PO) Take 15 mg by mouth two (2) times a day. Yes Provider, Historical   folic acid (FOLVITE) 1 mg tablet Take 2 mg by mouth daily. Yes Provider, Historical   OTHER CBD AND THC - EDIBLES , USES THEM DAILY - PATIENT TOLD PER ANESTHESIA PROTOCOL , TO STOP 7 DAYS PRIOR TO SURGERY. Patient not taking: Reported on 12/21/2021    Provider, Historical   PNV72-iron carb,glu-FA-dss-dha (CITRANATAL 90 DHA, ALGAL OIL,) 90 mg iron-1 mg -50 mg-300 mg cmpk daily. Patient not taking: Reported on 12/21/2021    Provider, Historical     Physical Exam:   General: alert, no distress   HEENT: Head: Normocephalic, no lesions, without obvious abnormality.    Heart: regular rate and rhythm, S1, S2 normal, no murmur, click, rub or gallop   Lungs: chest clear, no wheezing, rales, normal symmetric air entry   Abdominal: soft, NT/ND +BS    Neurological: Grossly normal   Extremities: extremities normal, atraumatic, no cyanosis or edema     Findings/Diagnosis: Hematochezia, Abdominal Pain    Plan of Care/Planned Procedure: EGD and Colonoscopy

## 2022-03-02 NOTE — ANESTHESIA POSTPROCEDURE EVALUATION
Procedure(s):  COLONOSCOPY  needs rapid covid  ESOPHAGOGASTRODUODENOSCOPY (EGD)  ESOPHAGOGASTRODUODENAL (EGD) BIOPSY. general, total IV anesthesia    Anesthesia Post Evaluation        Patient location during evaluation: PACU  Note status: Adequate. Level of consciousness: responsive to verbal stimuli and sleepy but conscious  Pain management: satisfactory to patient  Airway patency: patent  Anesthetic complications: no  Cardiovascular status: acceptable  Respiratory status: acceptable  Hydration status: acceptable  Comments: +Post-Anesthesia Evaluation and Assessment    Patient: Linda Carmichael MRN: [id number]  SSN: [id number]   YOB: 1990  Age: 32 y.o. Sex: female      Cardiovascular Function/Vital Signs    BP (!) 97/59   Pulse 90   Temp 36.7 °C (98 °F)   Resp 15   Ht 5' 4\" (1.626 m)   Wt 86 kg (189 lb 11.2 oz)   SpO2 100%   Breastfeeding No   BMI 32.56 kg/m²     Patient is status post Procedure(s):  COLONOSCOPY  needs rapid covid  ESOPHAGOGASTRODUODENOSCOPY (EGD)  ESOPHAGOGASTRODUODENAL (EGD) BIOPSY. Nausea/Vomiting: Controlled. Postoperative hydration reviewed and adequate. Pain:  Pain Scale 1: Visual (03/02/22 1123)  Pain Intensity 1: 0 (03/02/22 1123)   Managed. Neurological Status: At baseline. Mental Status and Level of Consciousness: Arousable. Pulmonary Status:   O2 Device: None (Room air) (03/02/22 1123)   Adequate oxygenation and airway patent. Complications related to anesthesia: None    Post-anesthesia assessment completed. No concerns. Signed By: China Ag MD    3/2/2022  Post anesthesia nausea and vomiting:  controlled      INITIAL Post-op Vital signs:   Vitals Value Taken Time   BP 98/78 03/02/22 1136   Temp     Pulse 84 03/02/22 1140   Resp 14 03/02/22 1140   SpO2 99 % 03/02/22 1140   Vitals shown include unvalidated device data.

## 2022-03-02 NOTE — PROCEDURES
Esophagogastroduodenoscopy Procedure Note      Ayo Osorio  1990  [id number]    Indication:  Abdominal pain, epigastric     Endoscopist: Tian Rubio MD    Referring Provider:  Vicie Crigler, NP    Sedation:  MAC anesthesia Propofol    Procedure Details:  After infomed consent was obtained for the procedure, with all risks and benefits of procedure explained the patient was taken to the endoscopy suite and placed in the left lateral decubitus position. Following sequential administration of sedation as per above, the endoscope was inserted into the mouth and advanced under direct vision to second portion of the duodenum. A careful inspection was made as the gastroscope was withdrawn, including a retroflexed view of the proximal stomach; findings and interventions are described below. Findings:     Esophagus:   - Normal esophageal mucosa. Z line noted at 39 cm from incisors which was normal.  No erosions seen. Stomach:   + Prepyloric erythema c/w mild gastritis s/p Bx. Pylorus patent. Duodenum:   - The bulb and post bulbar mucosa is normal in appearance to the second portion. The duodenal folds appeared normal.  Cold forceps biopsies to r/o celiac. Therapies: as above    Specimen: Specimens were collected as described and send to the laboratory. Complications:   None were encountered during the procedure. EBL:  < 10 ml.           Recommendations:   -f/u path      Tian Rubio MD  3/2/2022  11:11 AM

## 2022-03-06 LAB — SARS-COV-2, COV2: NORMAL

## 2022-03-17 ENCOUNTER — OFFICE VISIT (OUTPATIENT)
Dept: ORTHOPEDIC SURGERY | Age: 32
End: 2022-03-17
Payer: MEDICARE

## 2022-03-17 VITALS — HEIGHT: 64 IN | BODY MASS INDEX: 30.73 KG/M2 | WEIGHT: 180 LBS

## 2022-03-17 DIAGNOSIS — M25.552 LEFT HIP PAIN: Primary | ICD-10-CM

## 2022-03-17 DIAGNOSIS — M25.561 CHRONIC PAIN OF RIGHT KNEE: ICD-10-CM

## 2022-03-17 DIAGNOSIS — M21.852 HIP DYSPLASIA, ACQUIRED, LEFT: ICD-10-CM

## 2022-03-17 DIAGNOSIS — Z96.641 STATUS POST RIGHT HIP REPLACEMENT: ICD-10-CM

## 2022-03-17 DIAGNOSIS — G89.29 CHRONIC PAIN OF RIGHT KNEE: ICD-10-CM

## 2022-03-17 PROCEDURE — G8432 DEP SCR NOT DOC, RNG: HCPCS | Performed by: ORTHOPAEDIC SURGERY

## 2022-03-17 PROCEDURE — 99213 OFFICE O/P EST LOW 20 MIN: CPT | Performed by: ORTHOPAEDIC SURGERY

## 2022-03-17 PROCEDURE — G8417 CALC BMI ABV UP PARAM F/U: HCPCS | Performed by: ORTHOPAEDIC SURGERY

## 2022-03-17 PROCEDURE — G8427 DOCREV CUR MEDS BY ELIG CLIN: HCPCS | Performed by: ORTHOPAEDIC SURGERY

## 2022-03-17 PROCEDURE — G8756 NO BP MEASURE DOC: HCPCS | Performed by: ORTHOPAEDIC SURGERY

## 2022-03-17 NOTE — PROGRESS NOTES
Linda Carmichael (: 1990) is a 28 y.o. female, patient, here for evaluation of the following chief complaint(s):  Hip Pain (post op follow up, right TENZIN 2021, left hip pain ) and Knee Pain (right knee pain)       HPI:    Several issues status post hip replacement. None of them related to her right total hip. She has chief complaint today of left hip pain and right knee pain. Right total hip feels fine. Has not had any instability episodes. Allergies   Allergen Reactions    Ceclor Cd Hives    Ceclor [Cefaclor] Hives    Fish Containing Products Hives     Catfish only        Current Outpatient Medications   Medication Sig    predniSONE (STERAPRED DS) 10 mg dose pack prednisone 10 mg tablets in a dose pack   TAKE 6 TABLETS BY MOUTH ON DAY 1, 5 TABLETS ON DAY 2, 4 TABLETS ON DAY 3, 3 TABLETS ON DAY 4, 2 TABLETS ON DAY 5, 1 TABLET ON DAY 6    celecoxib (CELEBREX) 200 mg capsule     famotidine (PEPCID) 40 mg tablet famotidine 40 mg tablet   Take 1 tablet every day by oral route.  hydrOXYzine pamoate (VISTARIL) 25 mg capsule hydroxyzine pamoate 25 mg capsule   TAKE ONE CAPSULE BY MOUTH Three times a day as needed for anxiety    morphine (AVINza) 30 mg SR capsule morphine ER 30 mg capsule,extended release 24 hr multiphase    naloxone (Narcan) 4 mg/actuation nasal spray 4 mg by Nasal route once as needed.  aspirin delayed-release 325 mg tablet Take 1 Tablet by mouth two (2) times a day.  cyclobenzaprine (FLEXERIL) 10 mg tablet Take 10 mg by mouth three (3) times daily as needed for Muscle Spasm(s).  TURMERIC PO Take 2 Tablets by mouth daily.  ALPRAZolam (Xanax XR) 1 mg XR tablet Take  by mouth every morning.  DEXTROAMPHETAMINE/AMPHETAMINE (AMPHETAMINE SALT COMBO PO) Take 15 mg by mouth two (2) times a day.  folic acid (FOLVITE) 1 mg tablet Take 2 mg by mouth daily.  OTHER 10 mg.  Oxicodone    prenatal vit106-iron-folic-om3 25 mg iron-1 mg -400 mg cmpk prenat vit 106-iron 25 mg-folic acid 1 mg-om3 019 mg-dha-epa oral pack    acetaminophen (TYLENOL) 325 mg tablet Take 650 mg by mouth. (Patient not taking: Reported on 3/17/2022)    DULoxetine (CYMBALTA) 60 mg capsule 30 mg. (Patient not taking: Reported on 3/17/2022)    ergocalciferol (ERGOCALCIFEROL) 1,250 mcg (50,000 unit) capsule ergocalciferol (vitamin D2) 1,250 mcg (50,000 unit) capsule   1 po once per week    furosemide (LASIX) 40 mg tablet furosemide 40 mg tablet   TAKE ONE TABLET BY MOUTH DAILY AS NEEDED (Patient not taking: Reported on 3/2/2022)    Hysingla ER 60 mg ER tablet  (Patient not taking: Reported on 3/17/2022)    hydrocortisone (ANUSOL-HC) 2.5 % rectal cream Anusol-HC 2.5 % rectal cream with applicator   Insert 1 applicatorful twice a day by rectal route. (Patient not taking: Reported on 3/17/2022)    midazolam HCl/PF (Midazolam, PF,) 2 mg/2 mL (1 mg/mL) syrg 2 mg.  promethazine (PHENERGAN) 25 mg tablet     OTHER TART CHERRY SUPPLEMENT 2 TABS DAILY     OTHER MITOCHONDRIAL MAXIMIZER - 1 TAB BID     OTHER CBD AND THC - EDIBLES , USES THEM DAILY - PATIENT TOLD PER ANESTHESIA PROTOCOL , TO STOP 7 DAYS PRIOR TO SURGERY.  levOCARNitine Tartrate (L-CARNITINE, TARTRATE,) 500 mg cap Take 1,500 mg by mouth three (3) times daily.  PNV72-iron carb,glu-FA-dss-dha (CITRANATAL 90 DHA, ALGAL OIL,) 90 mg iron-1 mg -50 mg-300 mg cmpk daily.  gabapentin (NEURONTIN) 600 mg tablet Take 600 mg by mouth three (3) times daily. (Patient not taking: Reported on 3/2/2022)    ALPRAZolam (XANAX) 0.5 mg tablet Take 1 mg by mouth two (2) times a day. (Patient not taking: Reported on 3/17/2022)     No current facility-administered medications for this visit. Past Medical History:   Diagnosis Date    ADHD (attention deficit hyperactivity disorder) 2009    dx Dr. Tiffanie Pierre in [place]    Adverse effect of anesthesia     [address] [place] [address], NOTE ABOUT WHAT ANESTHESIA WORKS BEST FOR HER MITOCHONDRIAL DISEASE.      Arrhythmia TACHYCARDIA , HISTORY OF AFIB - SEES DR. BONILLA [phone]     Arthritis     Carnitine deficiency [address]     Chronic pain     from myositis, myopathy    Mg-Danlos syndrome     FH: rheumatoid arthritis 10/1/2013    Fibromyalgia     GERD (gastroesophageal reflux disease)     HSP (Henoch Schonlein purpura) ([address] 12/24/2014    occurs w myopathy flares    Mitochondrial disease ([facility]     Muscle pain     Muscle weakness     Myopathy 2005    Dr. Kari Yao, Dr. Jessica Tidwell, genetic/metabolic? mitochondrial disorder,  [facility] muscle biopsy    Tiredness     VSD (ventricular septal defect)     spontaneous closure        Past Surgical History:   Procedure Laterality Date    COLONOSCOPY N/A 3/2/2022    COLONOSCOPY  needs rapid covid performed by Krzysztof Curtis MD at [facility] ENDOSCOPY    HX ACL RECONSTRUCTION  2008    field hockey injury    HX DILATION AND EVACUATION      2016     HX LUMBAR FUSION  06/2017    L4L5 LUMBAR FUSION WITH LAMINECTOMY     HX ORTHOPAEDIC      ACL REPAIR    HX ORTHOPAEDIC  03/2021    RIGHT LABRAL HIP REPAIR     HX SKIN BIOPSY      MUSCLE BIOPSY X 2    NEUROLOGICAL PROCEDURE UNLISTED  03/2020    CERVICAL DISCKECTOMY  C3, C4, C5 AT [address] [place] [address] WITH NEURO ASSOCIATES    [place]    [place]  ~2007    [place]       Family History   Problem Relation Age of Onset    Diabetes Father     Heart Disease Father     Diabetes Mother    [address] [facility] Arthritis-rheumatoid Mother     Heart Disease Mother    [address] [facility] Arthritis-rheumatoid Sister     Diabetes Paternal Grandmother     Diabetes Paternal Grandfather     Diabetes Maternal Grandmother     Breast Cancer Other         1 great aunts        Social History     Socioeconomic History    Marital status: LEGALLY      Spouse name: Not on file    Number of children: 0    Years of education: Not on file    Highest education level: Not on file   Occupational History    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[place]   Tobacco Use    Smoking status: Never Smoker    Smokeless tobacco: Never Used   Vaping Use    Vaping Use: Never used   Substance and Sexual Activity    Alcohol use: No     Alcohol/week: 2.5 standard drinks     Types: 3 Standard drinks or equivalent per week    Drug use: No    Sexual activity: Not Currently   Other Topics Concern    Not on file   Social History Narrative    ** Merged History Encounter **          Social Determinants of Health     Financial Resource Strain:     Difficulty of Paying Living Expenses: Not on file   Food Insecurity:     Worried About Running Out of Food in the Last Year: Not on file    Abi of Food in the Last Year: Not on file   Transportation Needs:     Lack of Transportation (Medical): Not on file    Lack of Transportation (Non-Medical): Not on file   Physical Activity:     Days of Exercise per Week: Not on file    Minutes of Exercise per Session: Not on file   Stress:     Feeling of Stress : Not on file   Social Connections:     Frequency of Communication with Friends and Family: Not on file    Frequency of Social Gatherings with Friends and Family: Not on file    Attends Faith Services: Not on file    Active Member of [address] [place] [address] or Organizations: Not on file    Attends Club or Organization Meetings: Not on file    Marital Status: Not on file   Intimate Partner Violence:     Fear of Current or Ex-Partner: Not on file    Emotionally Abused: Not on file    Physically Abused: Not on file    Sexually Abused: Not on file   Housing Stability:     Unable to Pay for Housing in the Last Year: Not on file    Number of [place] in the Last Year: Not on file    Unstable Housing in the Last Year: Not on file             Vitals:  Ht 5' 4\" (1.626 m)   Wt 180 lb (81.6 kg)   BMI 30.90 kg/m²    Body mass index is 30.9 kg/m². PHYSICAL EXAM:  Right lower extremity exam shows a well-healed right hip incision with equal leg lengths. Her right knee has a small effusion with neutral overall alignment. Left hip is painful to flexion internal rotation maneuvers extends fully and flexes to 115 degrees. No Trendelenburg in her gait and she uses no assistive devices when she is walking. IMAGING:  XR Results (maximum last 3): Results from Appointment encounter on 03/17/22    XR HIP LT W OR WO PELV 2-3 VWS    Narrative  3 views left hip. She has well-maintained hip joint space with hip dysplasia and center edge angle around 14 degrees. Right total hip is well-positioned. XR KNEE RT 3 V    Narrative  Right knee 3 views. Standing AP, lateral, merchant. All of the joint spaces remain well-maintained. Left knee some lateral joint space narrowing status post ACL reconstruction      Results from Appointment encounter on 01/12/22    XR HIP RT W OR WO PELV 2-3 VWS    Narrative  AP pelvis, AP and frog lateral digital view radiographs of the right hip were obtained in the office today and reviewed with the patient and demonstrate anatomic alignment of components with no evidence of hardware loosening or subsidence. ASSESSMENT/PLAN:  1. Left hip pain  -     XR KNEE RT 3 V; Future  -     XR HIP LT W OR WO PELV 2-3 VWS; Future  2. Chronic pain of right knee  -     XR KNEE RT 3 V; Future  3. Status post right hip replacement  4. Hip dysplasia, acquired, left    At this point I would not recommend any interventions on her left hip or right knee. Continue conservative treatment with medications and activity modification. Physical therapy. Modalities. Right total hip is doing well. 1 year follow-up    An electronic signature was used to authenticate this note.   --Christen Seaman MD

## 2022-03-18 PROBLEM — M16.11 PRIMARY OSTEOARTHRITIS OF RIGHT HIP: Status: ACTIVE | Noted: 2021-12-27

## 2022-03-18 PROBLEM — M51.37 DDD (DEGENERATIVE DISC DISEASE), LUMBOSACRAL: Status: ACTIVE | Noted: 2017-04-14

## 2022-03-18 PROBLEM — S73.191A LABRAL TEAR OF RIGHT HIP JOINT: Status: ACTIVE | Noted: 2021-03-10

## 2022-03-18 PROBLEM — M51.379 DDD (DEGENERATIVE DISC DISEASE), LUMBOSACRAL: Status: ACTIVE | Noted: 2017-04-14

## 2022-03-19 PROBLEM — I10 HTN (HYPERTENSION): Status: ACTIVE | Noted: 2017-04-14

## 2022-03-20 PROBLEM — R00.0 SINUS TACHYCARDIA: Status: ACTIVE | Noted: 2018-01-17

## 2022-07-06 ENCOUNTER — TELEPHONE (OUTPATIENT)
Dept: ORTHOPEDIC SURGERY | Age: 32
End: 2022-07-06

## 2022-07-06 NOTE — TELEPHONE ENCOUNTER
Patient called to report shes having swelling on her right hip/leg (RTKA 12/2021), has an an abscess tooth and has been on several different antibiotics for four weeks. Hip/leg swelling began 7/5. Patient also reported to have a fever. Reached out to patient and advised patient to go to the ED per Dr. Kristyn Mistry orders. Dr. Beto Steve would like the patient to be evaluated for a potential blood clot. Patient verbalized understanding and stated she'd call for an ambulance and she does not have a way to get to the ED on her own.

## 2022-10-18 ENCOUNTER — OFFICE VISIT (OUTPATIENT)
Dept: ORTHOPEDIC SURGERY | Age: 32
End: 2022-10-18
Payer: MEDICARE

## 2022-10-18 VITALS — BODY MASS INDEX: 31.58 KG/M2 | HEIGHT: 64 IN | WEIGHT: 185 LBS

## 2022-10-18 DIAGNOSIS — M25.561 PAIN IN BOTH KNEES, UNSPECIFIED CHRONICITY: ICD-10-CM

## 2022-10-18 DIAGNOSIS — Z96.641 STATUS POST RIGHT HIP REPLACEMENT: Primary | ICD-10-CM

## 2022-10-18 DIAGNOSIS — M25.562 PAIN IN BOTH KNEES, UNSPECIFIED CHRONICITY: ICD-10-CM

## 2022-10-18 PROCEDURE — 99213 OFFICE O/P EST LOW 20 MIN: CPT | Performed by: ORTHOPAEDIC SURGERY

## 2022-10-18 PROCEDURE — G8756 NO BP MEASURE DOC: HCPCS | Performed by: ORTHOPAEDIC SURGERY

## 2022-10-18 PROCEDURE — G8417 CALC BMI ABV UP PARAM F/U: HCPCS | Performed by: ORTHOPAEDIC SURGERY

## 2022-10-18 PROCEDURE — G8427 DOCREV CUR MEDS BY ELIG CLIN: HCPCS | Performed by: ORTHOPAEDIC SURGERY

## 2022-10-18 PROCEDURE — G8432 DEP SCR NOT DOC, RNG: HCPCS | Performed by: ORTHOPAEDIC SURGERY

## 2022-10-18 NOTE — PROGRESS NOTES
Evonne Coet (: 1990) is a 28 y.o. female, patient, here for evaluation of the following chief complaint(s):  Hip Pain (Bilateral hip pain - RTHA 2021, no replacement in left ) and Knee Pain (Bilateral knee pain - right worse than left)       HPI:    Chief complaint is right hip and thigh pain. Very complicated situation. She is complaining of mid thigh pain. Not really anywhere located around her hip replacement. She did have an issue with an abscessed tooth which has since been pulled and treated with antibiotics. Allergies   Allergen Reactions    Ceclor Cd Hives    Ceclor [Cefaclor] Hives    Fish Containing Products Hives     Catfish only        Current Outpatient Medications   Medication Sig    OTHER 10 mg. Oxicodone    predniSONE (STERAPRED DS) 10 mg dose pack prednisone 10 mg tablets in a dose pack   TAKE 6 TABLETS BY MOUTH ON DAY 1, 5 TABLETS ON DAY 2, 4 TABLETS ON DAY 3, 3 TABLETS ON DAY 4, 2 TABLETS ON DAY 5, 1 TABLET ON DAY 6    prenatal vit106-iron-folic-om3 25 mg iron-1 mg -400 mg cmpk prenat vit 955-BVLB 25 mg-folic acid 1 mg-om3 708 mg-dha-epa oral pack    acetaminophen (TYLENOL) 325 mg tablet Take 650 mg by mouth. celecoxib (CELEBREX) 200 mg capsule     famotidine (PEPCID) 40 mg tablet famotidine 40 mg tablet   Take 1 tablet every day by oral route.    ergocalciferol (ERGOCALCIFEROL) 1,250 mcg (50,000 unit) capsule ergocalciferol (vitamin D2) 1,250 mcg (50,000 unit) capsule   1 po once per week    furosemide (LASIX) 40 mg tablet furosemide 40 mg tablet   TAKE ONE TABLET BY MOUTH DAILY AS NEEDED    hydrocortisone (ANUSOL-HC) 2.5 % rectal cream Anusol-HC 2.5 % rectal cream with applicator   Insert 1 applicatorful twice a day by rectal route.     hydrOXYzine pamoate (VISTARIL) 25 mg capsule hydroxyzine pamoate 25 mg capsule   TAKE ONE CAPSULE BY MOUTH Three times a day as needed for anxiety    morphine (AVINza) 30 mg SR capsule morphine ER 30 mg capsule,extended release 24 hr multiphase    naloxone (Narcan) 4 mg/actuation nasal spray 4 mg by Nasal route once as needed. promethazine (PHENERGAN) 25 mg tablet     cyclobenzaprine (FLEXERIL) 10 mg tablet Take 10 mg by mouth three (3) times daily as needed for Muscle Spasm(s). OTHER TART CHERRY SUPPLEMENT 2 TABS DAILY     TURMERIC PO Take 2 Tablets by mouth daily. OTHER MITOCHONDRIAL MAXIMIZER - 1 TAB BID     OTHER CBD AND THC - EDIBLES , USES THEM DAILY - PATIENT TOLD PER ANESTHESIA PROTOCOL , TO STOP 7 DAYS PRIOR TO SURGERY. ALPRAZolam (XANAX XR) 1 mg XR tablet Take  by mouth every morning. levOCARNitine Tartrate 500 mg cap Take 1,500 mg by mouth three (3) times daily. PNV72-iron carb,glu-FA-dss-dha 90 mg iron-1 mg -50 mg-300 mg cmpk daily. gabapentin (NEURONTIN) 600 mg tablet Take 600 mg by mouth three (3) times daily. ALPRAZolam (XANAX) 0.5 mg tablet Take 1 mg by mouth two (2) times a day. DEXTROAMPHETAMINE/AMPHETAMINE (AMPHETAMINE SALT COMBO PO) Take 15 mg by mouth two (2) times a day. folic acid (FOLVITE) 1 mg tablet Take 2 mg by mouth daily. DULoxetine (CYMBALTA) 60 mg capsule 30 mg. (Patient not taking: Reported on 3/17/2022)    Hysingla ER 60 mg ER tablet  (Patient not taking: Reported on 3/17/2022)    midazolam HCl/PF (Midazolam, PF,) 2 mg/2 mL (1 mg/mL) syrg 2 mg. aspirin delayed-release 325 mg tablet Take 1 Tablet by mouth two (2) times a day. No current facility-administered medications for this visit. Past Medical History:   Diagnosis Date    ADHD (attention deficit hyperactivity disorder) 2009    dx Dr. Jasmin Rivera in [place]    Adverse effect of anesthesia     [facility], NOTE ABOUT WHAT ANESTHESIA WORKS BEST FOR HER MITOCHONDRIAL DISEASE. Arrhythmia     TACHYCARDIA , HISTORY OF AFIB - SEES DR. BONILLA      Arthritis     Carnitine deficiency (HCC)     Chronic pain     from myositis, myopathy    Mg-Danlos syndrome     FH: rheumatoid arthritis 10/1/2013 Fibromyalgia     GERD (gastroesophageal reflux disease)     HSP (Henoch Schonlein purpura) ([facility] [address] 12/24/2014    occurs w myopathy flares    Mitochondrial disease ([facility] [address]     Muscle pain     Muscle weakness     Myopathy 2005    Dr. Xander Bacon, Dr. Valerio King? mitochondrial disorder,  [facility] muscle biopsy    Tiredness     VSD (ventricular septal defect)     spontaneous closure        Past Surgical History:   Procedure Laterality Date    COLONOSCOPY N/A 03/02/2022    COLONOSCOPY  needs rapid covid performed by Cherisse Ormond, MD at [facility] ENDOSCOPY    HX ACL RECONSTRUCTION  2008    field hockey injury    HX DILATION AND EVACUATION      2016     HX HIP REPLACEMENT  12/2021    HX HIP REPLACEMENT  12/2021    HX LUMBAR FUSION  06/2017    L4L5 LUMBAR FUSION WITH LAMINECTOMY     HX ORTHOPAEDIC      ACL REPAIR    HX ORTHOPAEDIC  03/2021    RIGHT LABRAL HIP REPAIR     HX SKIN BIOPSY      MUSCLE BIOPSY X 2    NEUROLOGICAL PROCEDURE UNLISTED  03/2020    CERVICAL DISCKECTOMY  C3, C4, C5 AT [facility]    OH LAMINECTOMY,CERVICAL  3/2021    [place] MUSCLE BIOPSY  01/2010    [place]    [place] MUSCLE BIOPSY  ~2007    [place]       Family History   Problem Relation Age of Onset    Diabetes Father     Heart Disease Father     OSTEOARTHRITIS Father     Hypertension Father     Diabetes Mother     Arthritis-rheumatoid Mother     Heart Disease Mother     OSTEOARTHRITIS Mother     Headache Mother     Hypertension Mother     Migraines Mother     Arthritis-rheumatoid Sister     OSTEOARTHRITIS Sister     Diabetes Paternal Grandmother     Diabetes Paternal Grandfather     Diabetes Maternal Grandmother     Breast Cancer Other         1 great aunts        Social History     Socioeconomic History    Marital status: LEGALLY      Spouse name: Not on file    Number of children: 0    Years of education: Not on file    Highest education level: Not on file   Occupational History    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 [place]   Tobacco Use    Smoking status: Never    Smokeless tobacco: Never   Vaping Use    Vaping Use: Never used   Substance and Sexual Activity    Alcohol use: No     Alcohol/week: 2.5 standard drinks     Types: 3 Standard drinks or equivalent per week    Drug use: No    Sexual activity: Not Currently     Partners: Male   Other Topics Concern    Not on file   Social History Narrative    ** Merged History Encounter **          Social Determinants of Health     Financial Resource Strain: Not on file   Food Insecurity: Not on file   Transportation Needs: Not on file   Physical Activity: Not on file   Stress: Not on file   Social Connections: Not on file   Intimate Partner Violence: Not on file   Housing Stability: Not on file       ROS    Positive for: Musculoskeletal  Last edited by Cari Galeana on 10/18/2022  4:49 PM.            Vitals:  Ht 5' 4\" (1.626 m)   Wt 185 lb (83.9 kg)   BMI 31.76 kg/m²    Body mass index is 31.76 kg/m². PHYSICAL EXAM:  On physical examination patient's right hip incision is well-healed. Hip is not irritable. She walks with a cane all hunched over. Bilateral hips had negative logroll and impingement test.    IMAGING:  XR Results (maximum last 3): Results from Appointment encounter on 10/18/22    XR PELV 1 OR 2 V    Narrative  AP pelvis x-ray. Right total hip well-positioned and fixed to the bone. Left hip joint space maintained. XR KNEES BI MIN 4 V    Narrative  4 x-ray views of patient's bilateral knees. Status post ACL reconstruction left knee. Both knee joint spaces appear well-maintained on multiple views. Results from Appointment encounter on 03/17/22    XR HIP LT W OR WO PELV 2-3 VWS    Narrative  3 views left hip. She has well-maintained hip joint space with hip dysplasia and center edge angle around 14 degrees. Right total hip is well-positioned. ASSESSMENT/PLAN:  1.  Status post right hip replacement  -     XR PELV 1 OR 2 V; Future  2. Pain in both knees, unspecified chronicity  -     XR KNEES BI MIN 4 V; Future    I really do not have any idea why she is having so much pain. Today in the office it almost.  She was going through narcotic withdrawal with sweating and pinpoint pupils. She is receiving pain medicine from her pain management physician. She should follow-up at this point with her primary care doctor and she probably needs to see a spine surgeon for repeat evaluation. An electronic signature was used to authenticate this note.   --Wendy Miguel MD

## 2022-10-18 NOTE — LETTER
10/18/2022    Patient: Stanford Kidd   YOB: 1990   Date of Visit: 10/18/2022     Sandro Ramirez NP  [address] [address]  Via Fax: [phone]    Dear Sandro Ramirez NP,      Thank you for referring Ms. Girish Zhu to [facility] for evaluation. My notes for this consultation are attached. If you have questions, please do not hesitate to call me. I look forward to following your patient along with you.       Sincerely,    Nani Luna MD

## 2023-01-25 ENCOUNTER — TRANSCRIBE ORDER (OUTPATIENT)
Dept: SCHEDULING | Age: 33
End: 2023-01-25

## 2023-01-25 DIAGNOSIS — M16.2 COXARTHROSIS RESULTING FROM DYSPLASIA, BILATERAL: Primary | ICD-10-CM

## 2023-01-25 DIAGNOSIS — M25.551 RIGHT HIP PAIN: ICD-10-CM

## 2023-01-25 DIAGNOSIS — M79.2 PAROXYSMAL NERVE PAIN: ICD-10-CM

## 2023-01-25 DIAGNOSIS — Q79.60 EHLERS-DANLOS SYNDROME: ICD-10-CM

## 2023-02-03 ENCOUNTER — HOSPITAL ENCOUNTER (OUTPATIENT)
Dept: MRI IMAGING | Age: 33
End: 2023-02-03
Attending: NURSE PRACTITIONER
Payer: MEDICARE

## 2023-02-03 VITALS — WEIGHT: 185 LBS | BODY MASS INDEX: 31.76 KG/M2

## 2023-02-03 DIAGNOSIS — M79.2 PAROXYSMAL NERVE PAIN: ICD-10-CM

## 2023-02-03 DIAGNOSIS — M25.551 RIGHT HIP PAIN: ICD-10-CM

## 2023-02-03 DIAGNOSIS — Q79.60 EHLERS-DANLOS SYNDROME: ICD-10-CM

## 2023-02-03 DIAGNOSIS — M16.2 COXARTHROSIS RESULTING FROM DYSPLASIA, BILATERAL: ICD-10-CM

## 2023-02-03 PROCEDURE — 72156 MRI NECK SPINE W/O & W/DYE: CPT

## 2023-02-03 PROCEDURE — 74011250636 HC RX REV CODE- 250/636: Performed by: RADIOLOGY

## 2023-02-03 PROCEDURE — A9576 INJ PROHANCE MULTIPACK: HCPCS | Performed by: RADIOLOGY

## 2023-02-03 RX ADMIN — GADOTERIDOL 15 ML: 279.3 INJECTION, SOLUTION INTRAVENOUS at 15:36

## 2023-02-08 ENCOUNTER — TRANSCRIBE ORDER (OUTPATIENT)
Dept: SCHEDULING | Age: 33
End: 2023-02-08

## 2023-02-08 DIAGNOSIS — M16.2 OSTEOARTHRITIS OF BILATERAL HIPS RESULTING FROM HIP DYSPLASIA: Primary | ICD-10-CM

## 2024-01-11 ENCOUNTER — HOSPITAL ENCOUNTER (EMERGENCY)
Facility: HOSPITAL | Age: 34
Discharge: HOME | End: 2024-01-12
Attending: EMERGENCY MEDICINE
Payer: MEDICARE

## 2024-01-11 ENCOUNTER — APPOINTMENT (OUTPATIENT)
Facility: HOSPITAL | Age: 34
End: 2024-01-11
Payer: MEDICARE

## 2024-01-11 DIAGNOSIS — M25.551 RIGHT HIP PAIN: ICD-10-CM

## 2024-01-11 DIAGNOSIS — K59.00 CONSTIPATION, UNSPECIFIED CONSTIPATION TYPE: ICD-10-CM

## 2024-01-11 DIAGNOSIS — M54.50 LOW BACK PAIN WITHOUT SCIATICA, UNSPECIFIED BACK PAIN LATERALITY, UNSPECIFIED CHRONICITY: Primary | ICD-10-CM

## 2024-01-11 DIAGNOSIS — K62.5 RECTAL BLEEDING: ICD-10-CM

## 2024-01-11 DIAGNOSIS — G89.4 CHRONIC PAIN SYNDROME: ICD-10-CM

## 2024-01-11 LAB
ALBUMIN SERPL-MCNC: 3.8 G/DL (ref 3.5–5)
ALBUMIN/GLOB SERPL: 1 (ref 1.1–2.2)
ALP SERPL-CCNC: 67 U/L (ref 45–117)
ALT SERPL-CCNC: 77 U/L (ref 12–78)
ANION GAP BLD CALC-SCNC: 4.3 MMOL/L (ref 10–20)
ANION GAP SERPL CALC-SCNC: 6 MMOL/L (ref 5–15)
AST SERPL-CCNC: 29 U/L (ref 15–37)
BASOPHILS # BLD: 0 K/UL (ref 0–0.1)
BASOPHILS NFR BLD: 0 % (ref 0–1)
BILIRUB SERPL-MCNC: 0.2 MG/DL (ref 0.2–1)
BUN SERPL-MCNC: 18 MG/DL (ref 6–20)
BUN/CREAT SERPL: 23 (ref 12–20)
CA-I BLD-MCNC: 1.19 MMOL/L (ref 1.12–1.32)
CALCIUM SERPL-MCNC: 9.1 MG/DL (ref 8.5–10.1)
CHLORIDE BLD-SCNC: 102 MMOL/L (ref 98–107)
CHLORIDE SERPL-SCNC: 103 MMOL/L (ref 97–108)
CO2 BLD-SCNC: 32.7 MMOL/L (ref 21–32)
CO2 SERPL-SCNC: 31 MMOL/L (ref 21–32)
COMMENT:: NORMAL
CREAT BLD-MCNC: 0.64 MG/DL (ref 0.6–1.3)
CREAT SERPL-MCNC: 0.8 MG/DL (ref 0.55–1.02)
DIFFERENTIAL METHOD BLD: NORMAL
EOSINOPHIL # BLD: 0.3 K/UL (ref 0–0.4)
EOSINOPHIL NFR BLD: 4 % (ref 0–7)
ERYTHROCYTE [DISTWIDTH] IN BLOOD BY AUTOMATED COUNT: 12.2 % (ref 11.5–14.5)
GLOBULIN SER CALC-MCNC: 3.7 G/DL (ref 2–4)
GLUCOSE BLD-MCNC: 111 MG/DL (ref 65–100)
GLUCOSE SERPL-MCNC: 113 MG/DL (ref 65–100)
HCT VFR BLD AUTO: 41.1 % (ref 35–47)
HGB BLD-MCNC: 13.6 G/DL (ref 11.5–16)
IMM GRANULOCYTES # BLD AUTO: 0 K/UL (ref 0–0.04)
IMM GRANULOCYTES NFR BLD AUTO: 0 % (ref 0–0.5)
INR PPP: 1 (ref 0.9–1.1)
LYMPHOCYTES # BLD: 2.2 K/UL (ref 0.8–3.5)
LYMPHOCYTES NFR BLD: 32 % (ref 12–49)
MCH RBC QN AUTO: 29.5 PG (ref 26–34)
MCHC RBC AUTO-ENTMCNC: 33.1 G/DL (ref 30–36.5)
MCV RBC AUTO: 89.2 FL (ref 80–99)
MONOCYTES # BLD: 0.6 K/UL (ref 0–1)
MONOCYTES NFR BLD: 9 % (ref 5–13)
NEUTS SEG # BLD: 3.7 K/UL (ref 1.8–8)
NEUTS SEG NFR BLD: 55 % (ref 32–75)
NRBC # BLD: 0 K/UL (ref 0–0.01)
NRBC BLD-RTO: 0 PER 100 WBC
PLATELET # BLD AUTO: 270 K/UL (ref 150–400)
PMV BLD AUTO: 10.5 FL (ref 8.9–12.9)
POTASSIUM BLD-SCNC: 5 MMOL/L (ref 3.5–5.1)
POTASSIUM SERPL-SCNC: 4.5 MMOL/L (ref 3.5–5.1)
PROT SERPL-MCNC: 7.5 G/DL (ref 6.4–8.2)
PROTHROMBIN TIME: 9.9 SEC (ref 9–11.1)
RBC # BLD AUTO: 4.61 M/UL (ref 3.8–5.2)
SERVICE CMNT-IMP: ABNORMAL
SODIUM BLD-SCNC: 139 MMOL/L (ref 136–145)
SODIUM SERPL-SCNC: 140 MMOL/L (ref 136–145)
SPECIMEN HOLD: NORMAL
WBC # BLD AUTO: 6.8 K/UL (ref 3.6–11)

## 2024-01-11 PROCEDURE — 2500000003 HC RX 250 WO HCPCS

## 2024-01-11 PROCEDURE — 80053 COMPREHEN METABOLIC PANEL: CPT

## 2024-01-11 PROCEDURE — 80047 BASIC METABLC PNL IONIZED CA: CPT

## 2024-01-11 PROCEDURE — 99285 EMERGENCY DEPT VISIT HI MDM: CPT

## 2024-01-11 PROCEDURE — 6360000004 HC RX CONTRAST MEDICATION: Performed by: EMERGENCY MEDICINE

## 2024-01-11 PROCEDURE — 2500000003 HC RX 250 WO HCPCS: Performed by: EMERGENCY MEDICINE

## 2024-01-11 PROCEDURE — 74177 CT ABD & PELVIS W/CONTRAST: CPT

## 2024-01-11 PROCEDURE — 85610 PROTHROMBIN TIME: CPT

## 2024-01-11 PROCEDURE — 6360000002 HC RX W HCPCS: Performed by: EMERGENCY MEDICINE

## 2024-01-11 PROCEDURE — 6360000002 HC RX W HCPCS

## 2024-01-11 PROCEDURE — 96374 THER/PROPH/DIAG INJ IV PUSH: CPT

## 2024-01-11 PROCEDURE — 96375 TX/PRO/DX INJ NEW DRUG ADDON: CPT

## 2024-01-11 PROCEDURE — 2580000003 HC RX 258: Performed by: EMERGENCY MEDICINE

## 2024-01-11 PROCEDURE — 36415 COLL VENOUS BLD VENIPUNCTURE: CPT

## 2024-01-11 PROCEDURE — 85025 COMPLETE CBC W/AUTO DIFF WBC: CPT

## 2024-01-11 RX ORDER — HYDROMORPHONE HYDROCHLORIDE 1 MG/ML
2 INJECTION, SOLUTION INTRAMUSCULAR; INTRAVENOUS; SUBCUTANEOUS ONCE
Status: COMPLETED | OUTPATIENT
Start: 2024-01-11 | End: 2024-01-11

## 2024-01-11 RX ORDER — ONDANSETRON 2 MG/ML
4 INJECTION INTRAMUSCULAR; INTRAVENOUS
Status: COMPLETED | OUTPATIENT
Start: 2024-01-11 | End: 2024-01-11

## 2024-01-11 RX ORDER — ONDANSETRON 2 MG/ML
INJECTION INTRAMUSCULAR; INTRAVENOUS
Status: COMPLETED
Start: 2024-01-11 | End: 2024-01-11

## 2024-01-11 RX ORDER — 0.9 % SODIUM CHLORIDE 0.9 %
1000 INTRAVENOUS SOLUTION INTRAVENOUS ONCE
Status: COMPLETED | OUTPATIENT
Start: 2024-01-11 | End: 2024-01-12

## 2024-01-11 RX ORDER — HYDROMORPHONE HYDROCHLORIDE 1 MG/ML
1 INJECTION, SOLUTION INTRAMUSCULAR; INTRAVENOUS; SUBCUTANEOUS ONCE
Status: COMPLETED | OUTPATIENT
Start: 2024-01-11 | End: 2024-01-11

## 2024-01-11 RX ORDER — KETOROLAC TROMETHAMINE 30 MG/ML
30 INJECTION, SOLUTION INTRAMUSCULAR; INTRAVENOUS
Status: COMPLETED | OUTPATIENT
Start: 2024-01-11 | End: 2024-01-11

## 2024-01-11 RX ADMIN — ONDANSETRON 4 MG: 2 INJECTION INTRAMUSCULAR; INTRAVENOUS at 21:57

## 2024-01-11 RX ADMIN — KETOROLAC TROMETHAMINE 30 MG: 30 INJECTION INTRAMUSCULAR; INTRAVENOUS at 22:49

## 2024-01-11 RX ADMIN — HYDROMORPHONE HYDROCHLORIDE 2 MG: 1 INJECTION, SOLUTION INTRAMUSCULAR; INTRAVENOUS; SUBCUTANEOUS at 23:31

## 2024-01-11 RX ADMIN — IOPAMIDOL 100 ML: 755 INJECTION, SOLUTION INTRAVENOUS at 23:43

## 2024-01-11 RX ADMIN — HYDROMORPHONE HYDROCHLORIDE 1 MG: 1 INJECTION, SOLUTION INTRAMUSCULAR; INTRAVENOUS; SUBCUTANEOUS at 21:56

## 2024-01-11 RX ADMIN — SODIUM CHLORIDE 1000 ML: 9 INJECTION, SOLUTION INTRAVENOUS at 21:55

## 2024-01-11 ASSESSMENT — PAIN DESCRIPTION - LOCATION
LOCATION: BACK;PELVIS;HIP
LOCATION: HIP

## 2024-01-11 ASSESSMENT — PAIN - FUNCTIONAL ASSESSMENT
PAIN_FUNCTIONAL_ASSESSMENT: 0-10
PAIN_FUNCTIONAL_ASSESSMENT: PREVENTS OR INTERFERES WITH MANY ACTIVE NOT PASSIVE ACTIVITIES

## 2024-01-11 ASSESSMENT — PAIN DESCRIPTION - PAIN TYPE: TYPE: CHRONIC PAIN

## 2024-01-11 ASSESSMENT — PAIN DESCRIPTION - DESCRIPTORS
DESCRIPTORS: ACHING;DISCOMFORT
DESCRIPTORS: THROBBING

## 2024-01-11 ASSESSMENT — PAIN SCALES - GENERAL
PAINLEVEL_OUTOF10: 8
PAINLEVEL_OUTOF10: 8

## 2024-01-11 ASSESSMENT — PAIN DESCRIPTION - ORIENTATION
ORIENTATION: LOWER
ORIENTATION: RIGHT

## 2024-01-12 VITALS
OXYGEN SATURATION: 98 % | HEART RATE: 99 BPM | HEIGHT: 64 IN | SYSTOLIC BLOOD PRESSURE: 134 MMHG | TEMPERATURE: 98.4 F | RESPIRATION RATE: 20 BRPM | WEIGHT: 220 LBS | DIASTOLIC BLOOD PRESSURE: 84 MMHG | BODY MASS INDEX: 37.56 KG/M2

## 2024-01-12 RX ORDER — POLYETHYLENE GLYCOL 3350 17 G/17G
POWDER, FOR SOLUTION ORAL
Qty: 1530 G | Refills: 1 | Status: SHIPPED | OUTPATIENT
Start: 2024-01-12

## 2024-01-12 RX ORDER — METHYLPREDNISOLONE 4 MG/1
TABLET ORAL
Qty: 1 KIT | Refills: 0 | Status: SHIPPED | OUTPATIENT
Start: 2024-01-12 | End: 2024-01-18

## 2024-01-12 ASSESSMENT — PAIN DESCRIPTION - DESCRIPTORS: DESCRIPTORS: ACHING

## 2024-01-12 ASSESSMENT — PAIN DESCRIPTION - LOCATION: LOCATION: BACK;PELVIS;NECK

## 2024-01-12 ASSESSMENT — PAIN DESCRIPTION - PAIN TYPE: TYPE: CHRONIC PAIN

## 2024-01-12 ASSESSMENT — PAIN SCALES - GENERAL: PAINLEVEL_OUTOF10: 8

## 2024-01-12 ASSESSMENT — PAIN - FUNCTIONAL ASSESSMENT: PAIN_FUNCTIONAL_ASSESSMENT: PREVENTS OR INTERFERES WITH MANY ACTIVE NOT PASSIVE ACTIVITIES

## 2024-01-12 NOTE — ED TRIAGE NOTES
Pt presents with ems with complaint of right hip pain, lower back and neck pain.  Pt also complains of nausea and bloody stools.

## 2024-01-12 NOTE — ED PROVIDER NOTES
by mouth daily    FUROSEMIDE (LASIX) 40 MG TABLET    furosemide 40 mg tablet   TAKE ONE TABLET BY MOUTH DAILY AS NEEDED    GABAPENTIN (NEURONTIN) 600 MG TABLET    Take 600 mg by mouth 3 times daily.    HYDROCODONE (HYSINGLA ER) 60 MG EXTENDED RELEASE TABLET    ceived the following from [facility]: Outside name: Hysingla ER 60 mg ER tablet    HYDROCORTISONE 2.5 % CREAM    Anusol-HC 2.5 % rectal cream with applicator   Insert 1 applicatorful twice a day by rectal route.    HYDROXYZINE PAMOATE (VISTARIL) 25 MG CAPSULE    hydroxyzine pamoate 25 mg capsule   TAKE ONE CAPSULE BY MOUTH Three times a day as needed for anxiety    LEVOCARNITINE L-TARTRATE 500 MG CAPS    Take 1,500 mg by mouth 3 times daily    MIDAZOLAM (VERSED) 2 MG/2ML SOLN INJECTION    2 mg.    MORPHINE (AVINZA) 30 MG EXTENDED RELEASE CAPSULE    morphine ER 30 mg capsule,extended release 24 hr multiphase    NALOXONE (NARCAN) 4 MG/0.1ML LIQD NASAL SPRAY    4 mg by Nasal route once as needed    PREDNISONE 10 MG (21) TBPK    prednisone 10 mg tablets in a dose pack   TAKE 6 TABLETS BY MOUTH ON DAY 1, 5 TABLETS ON DAY 2, 4 TABLETS ON DAY 3, 3 TABLETS ON DAY 4, 2 TABLETS ON DAY 5, 1 TABLET ON DAY 6    PROMETHAZINE (PHENERGAN) 25 MG TABLET    ceived the following from [facility]: Outside name: promethazine (PHENERGAN) 25 mg tablet    TURMERIC PO    Take 2 tablets by mouth daily       ALLERGIES     Cefaclor and Fish-derived products    FAMILY HISTORY       Family History   Problem Relation Age of Onset    Osteoarthritis Father     Hypertension Father     Diabetes Mother     Rheum Arthritis Mother     Heart Disease Mother     Headache Mother     Hypertension Mother     Migraines Mother     Rheum Arthritis Sister     Osteoarthritis Sister     Heart Disease Father     Diabetes Father     Breast Cancer Other         1 great aunts    Diabetes Maternal Grandmother     Diabetes Paternal Grandfather     Diabetes Paternal Grandmother              Medical Decision Making  Amount and/or Complexity of Data Reviewed  Labs: ordered.  Radiology: ordered.    Risk  Prescription drug management.    33-year-old female presenting with the above chief complaint.  Vitals are stable.  No appreciable weakness on exam but has lots of pain with range of motion of the right hip.  Given her areas of pain and rectal bleeding CT abdomen pelvis ordered.  I instructed radiology to evaluate her lumbar spine as well and we can also evaluate her hip that way.  She will be treated symptomatically.  Labs are reassuring.  Care signed out to Dr. Colorado pending results of her CT scan        REASSESSMENT          CONSULTS:  None    PROCEDURES:     Procedures          (Please note that portions of this note were completed with a voice recognition program.  Efforts were made to edit the dictations but occasionally words are mis-transcribed.)    Marcos Sim MD (electronically signed)  Emergency Attending Physician              Marcos Sim MD  01/11/24 7497

## 2024-01-12 NOTE — ED NOTES
The patient was signed out to me by Dr. Sim at 10:15 PM.  Bedside signout performed and the patient was reevaluated.  She complains of persistent discomfort and not sure whether or not she was medicated.  Awaiting CT of the abdomen and pelvis with IV contrast result and will reassess afterwards.    Aman Colorado MD 10:30PM    PROGRESS NOTE:  Medication reviewed and the patient just received 1 mg of Dilaudid.  The patient will be given 30 mg of Toradol for further pain management while awaiting performance of CT..    Written by Aman Colorado MD,  10:45 PM    PROGRESS NOTE:  I was told by radiology technician that patient refused CT scan until she received additional pain medication.  I went to the room and reevaluated the patient and spoke to the family at the bedside.  Discussed pain management protocol process with focus on safety and titration.  After reviewed of with the patient pain regimen, will administer 2 mg of Dilaudid IV so the patient can have the CT scan of the abdomen and pelvis as ordered.  Written by Aman Colorado MD,  11:30 PM    PROGRESS NOTE:    Pt has been reexamined by me.I stated that she is feeling better.  Her discomfort has significantly improved and can proceed with the performance of the CT.    Written by Aman Colorado MD, 12:PM.    Progress Note:   Pt has been reexamined by Aman Colorado MD..  She had an extensive workup without any significant findings.  All available results have been reviewed with pt and any available family.  Suspect that rectal bleeding is likely secondary to constipation and internal hemorrhoid.  She remained otherwise stable.  Pt understands sx, dx, and tx in ED. Care plan has been outlined and questions have been answered. Pt is ready to go home. Will send home on chronic low back pain/constipation/chronic pain syndrome/and rectal bleeding instruction.. Outpatient referral with PCP at [facility] for evaluation and further treatment as needed. Written by Aman Colorado

## 2024-01-30 ENCOUNTER — TELEPHONE (OUTPATIENT)
Age: 34
End: 2024-01-30

## 2024-01-30 NOTE — TELEPHONE ENCOUNTER
[facility] Outpatient Palliative Medicine Office  Nursing Note  [phone]  Fax [phone]     Name:  Ericka Patton  YOB: 1990     Incoming call from patient who is inquiring about outpatient Palliative Medicine services.  Patient states that she has been seen by [facility] in the past and their NP left so she needs to find another Palliative provider.  Patient states that she has adhesive arachnoiditis and her quality of life is poor due to her pain and debility.  Patient says that she is currently living with her parents.  She reports that her mother who was her primary caregiver has been sick recently and is less able to care for her.  Patient is tearful at times.    Brief chart review of [facility] notes when patient was on the phone--- patient confirms that she was briefly in [facility] in Aug/Sept 2023.  She says she was discharged and hospice recommended that she follow up with Palliative.      Patient states that her current PCP Dr. Aman Gordon at [facility] is weaning her opioids.        This nurse explained that the guidelines for our outpatient Specialty Palliative Medicine clinic are:  Patient is in the latter stages of a serious progressive illness, and in addition to limited life expectancy, the patient has the presence of one or more Palliative needs (such as care decisions, overwhelming symptoms, psychosocial distress).  The providers in our outpatient Specialty Palliative Clinic do not see patients for chronic pain management.    Patient asks that her case be reviewed because she believes that she meets the above criteria.      She agrees to sign a release form so this nurse can request records from [facility] and from her current PCP.  Patient also says that she thinks the notes from her psychologist (Suze Corey) at [facility] will provided needed information.  Authorization to Release Health Information Form sent to

## 2024-04-19 ENCOUNTER — HOSPITAL ENCOUNTER (OUTPATIENT)
Facility: HOSPITAL | Age: 34
End: 2024-04-19
Attending: ANESTHESIOLOGY
Payer: MEDICARE

## 2024-04-19 DIAGNOSIS — M47.894 OTHER SPONDYLOSIS, THORACIC REGION: ICD-10-CM

## 2024-04-19 DIAGNOSIS — M47.816 SPONDYLOSIS WITHOUT MYELOPATHY OR RADICULOPATHY, LUMBAR REGION: ICD-10-CM

## 2024-04-19 DIAGNOSIS — M47.812 SPONDYLOSIS WITHOUT MYELOPATHY OR RADICULOPATHY, CERVICAL REGION: ICD-10-CM

## 2024-04-19 PROCEDURE — 72148 MRI LUMBAR SPINE W/O DYE: CPT

## 2024-04-19 PROCEDURE — 72146 MRI CHEST SPINE W/O DYE: CPT

## 2024-04-19 PROCEDURE — 72141 MRI NECK SPINE W/O DYE: CPT

## 2024-06-10 ENCOUNTER — APPOINTMENT (OUTPATIENT)
Facility: HOSPITAL | Age: 34
DRG: 563 | End: 2024-06-10
Payer: MEDICARE

## 2024-06-10 ENCOUNTER — HOSPITAL ENCOUNTER (INPATIENT)
Facility: HOSPITAL | Age: 34
LOS: 1 days | Discharge: HOME | DRG: 563 | End: 2024-06-13
Attending: STUDENT IN AN ORGANIZED HEALTH CARE EDUCATION/TRAINING PROGRAM | Admitting: STUDENT IN AN ORGANIZED HEALTH CARE EDUCATION/TRAINING PROGRAM
Payer: MEDICARE

## 2024-06-10 DIAGNOSIS — M25.551 RIGHT HIP PAIN: ICD-10-CM

## 2024-06-10 DIAGNOSIS — R00.0 TACHYCARDIA: ICD-10-CM
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
## 2024-06-10 DIAGNOSIS — M25.561 ACUTE PAIN OF RIGHT KNEE: ICD-10-CM

## 2024-06-10 PROBLEM — E88.40 MITOCHONDRIAL DISEASE (HCC): Status: ACTIVE | Noted: 2024-06-10

## 2024-06-10 PROBLEM — M19.90 ARTHRITIS: Status: ACTIVE | Noted: 2024-06-10

## 2024-06-10 PROBLEM — M16.11 PRIMARY OSTEOARTHRITIS OF RIGHT HIP: Status: RESOLVED | Noted: 2021-12-27 | Resolved: 2024-06-10

## 2024-06-10 PROBLEM — E66.01 MORBID OBESITY (HCC): Status: ACTIVE | Noted: 2024-06-10

## 2024-06-10 PROBLEM — Z79.899 POLYPHARMACY: Status: ACTIVE | Noted: 2024-06-10

## 2024-06-10 PROBLEM — M62.81 MUSCLE WEAKNESS: Status: ACTIVE | Noted: 2024-06-10

## 2024-06-10 PROBLEM — M25.552 HIP PAIN, BILATERAL: Status: ACTIVE | Noted: 2024-06-10

## 2024-06-10 PROBLEM — S73.191A LABRAL TEAR OF RIGHT HIP JOINT: Status: RESOLVED | Noted: 2021-03-10 | Resolved: 2024-06-10

## 2024-06-10 PROBLEM — Q79.60 EHLERS-DANLOS SYNDROME: Status: ACTIVE | Noted: 2024-06-10

## 2024-06-10 LAB
ALBUMIN SERPL-MCNC: 4.2 G/DL (ref 3.5–5)
ALBUMIN/GLOB SERPL: 1.2 (ref 1.1–2.2)
ALP SERPL-CCNC: 79 U/L (ref 45–117)
ALT SERPL-CCNC: 333 U/L (ref 12–78)
ANION GAP SERPL CALC-SCNC: 8 MMOL/L (ref 5–15)
AST SERPL-CCNC: 30 U/L (ref 15–37)
BASOPHILS # BLD: 0 K/UL (ref 0–0.1)
BASOPHILS NFR BLD: 0 % (ref 0–1)
BILIRUB SERPL-MCNC: 0.3 MG/DL (ref 0.2–1)
BUN SERPL-MCNC: 16 MG/DL (ref 6–20)
BUN/CREAT SERPL: 16 (ref 12–20)
CALCIUM SERPL-MCNC: 8.9 MG/DL (ref 8.5–10.1)
CHLORIDE SERPL-SCNC: 101 MMOL/L (ref 97–108)
CO2 SERPL-SCNC: 29 MMOL/L (ref 21–32)
COMMENT:: NORMAL
CREAT SERPL-MCNC: 0.97 MG/DL (ref 0.55–1.02)
DIFFERENTIAL METHOD BLD: ABNORMAL
EOSINOPHIL # BLD: 0.3 K/UL (ref 0–0.4)
EOSINOPHIL NFR BLD: 2 % (ref 0–7)
ERYTHROCYTE [DISTWIDTH] IN BLOOD BY AUTOMATED COUNT: 12.6 % (ref 11.5–14.5)
GLOBULIN SER CALC-MCNC: 3.4 G/DL (ref 2–4)
GLUCOSE SERPL-MCNC: 166 MG/DL (ref 65–100)
HCT VFR BLD AUTO: 39.5 % (ref 35–47)
HGB BLD-MCNC: 13 G/DL (ref 11.5–16)
IMM GRANULOCYTES # BLD AUTO: 0.1 K/UL (ref 0–0.04)
IMM GRANULOCYTES NFR BLD AUTO: 0 % (ref 0–0.5)
LYMPHOCYTES # BLD: 3.5 K/UL (ref 0.8–3.5)
LYMPHOCYTES NFR BLD: 20 % (ref 12–49)
MCH RBC QN AUTO: 29.4 PG (ref 26–34)
MCHC RBC AUTO-ENTMCNC: 32.9 G/DL (ref 30–36.5)
MCV RBC AUTO: 89.4 FL (ref 80–99)
MONOCYTES # BLD: 1.2 K/UL (ref 0–1)
MONOCYTES NFR BLD: 7 % (ref 5–13)
NEUTS SEG # BLD: 12.7 K/UL (ref 1.8–8)
NEUTS SEG NFR BLD: 71 % (ref 32–75)
NRBC # BLD: 0 K/UL (ref 0–0.01)
NRBC BLD-RTO: 0 PER 100 WBC
PLATELET # BLD AUTO: 310 K/UL (ref 150–400)
PMV BLD AUTO: 11 FL (ref 8.9–12.9)
POTASSIUM SERPL-SCNC: 3.8 MMOL/L (ref 3.5–5.1)
PROT SERPL-MCNC: 7.6 G/DL (ref 6.4–8.2)
RBC # BLD AUTO: 4.42 M/UL (ref 3.8–5.2)
SODIUM SERPL-SCNC: 138 MMOL/L (ref 136–145)
SPECIMEN HOLD: NORMAL
WBC # BLD AUTO: 17.9 K/UL (ref 3.6–11)

## 2024-06-10 PROCEDURE — 6360000002 HC RX W HCPCS: Performed by: STUDENT IN AN ORGANIZED HEALTH CARE EDUCATION/TRAINING PROGRAM

## 2024-06-10 PROCEDURE — G0378 HOSPITAL OBSERVATION PER HR: HCPCS

## 2024-06-10 PROCEDURE — 80053 COMPREHEN METABOLIC PANEL: CPT

## 2024-06-10 PROCEDURE — 72192 CT PELVIS W/O DYE: CPT

## 2024-06-10 PROCEDURE — 96374 THER/PROPH/DIAG INJ IV PUSH: CPT

## 2024-06-10 PROCEDURE — 73700 CT LOWER EXTREMITY W/O DYE: CPT

## 2024-06-10 PROCEDURE — 96375 TX/PRO/DX INJ NEW DRUG ADDON: CPT

## 2024-06-10 PROCEDURE — 6370000000 HC RX 637 (ALT 250 FOR IP): Performed by: STUDENT IN AN ORGANIZED HEALTH CARE EDUCATION/TRAINING PROGRAM

## 2024-06-10 PROCEDURE — 99285 EMERGENCY DEPT VISIT HI MDM: CPT

## 2024-06-10 PROCEDURE — 6370000000 HC RX 637 (ALT 250 FOR IP): Performed by: INTERNAL MEDICINE

## 2024-06-10 PROCEDURE — 2580000003 HC RX 258: Performed by: INTERNAL MEDICINE

## 2024-06-10 PROCEDURE — 96372 THER/PROPH/DIAG INJ SC/IM: CPT

## 2024-06-10 PROCEDURE — 2500000003 HC RX 250 WO HCPCS: Performed by: STUDENT IN AN ORGANIZED HEALTH CARE EDUCATION/TRAINING PROGRAM

## 2024-06-10 PROCEDURE — 96361 HYDRATE IV INFUSION ADD-ON: CPT

## 2024-06-10 PROCEDURE — 73552 X-RAY EXAM OF FEMUR 2/>: CPT

## 2024-06-10 PROCEDURE — 2580000003 HC RX 258: Performed by: STUDENT IN AN ORGANIZED HEALTH CARE EDUCATION/TRAINING PROGRAM

## 2024-06-10 PROCEDURE — 85025 COMPLETE CBC W/AUTO DIFF WBC: CPT

## 2024-06-10 PROCEDURE — 36415 COLL VENOUS BLD VENIPUNCTURE: CPT

## 2024-06-10 PROCEDURE — 6360000002 HC RX W HCPCS

## 2024-06-10 PROCEDURE — 6360000002 HC RX W HCPCS: Performed by: INTERNAL MEDICINE

## 2024-06-10 PROCEDURE — 72131 CT LUMBAR SPINE W/O DYE: CPT

## 2024-06-10 RX ORDER — ONDANSETRON 2 MG/ML
4 INJECTION INTRAMUSCULAR; INTRAVENOUS EVERY 6 HOURS PRN
Status: DISCONTINUED | OUTPATIENT
Start: 2024-06-10 | End: 2024-06-13 | Stop reason: HOSPADM

## 2024-06-10 RX ORDER — CYCLOBENZAPRINE HCL 10 MG
10 TABLET ORAL 3 TIMES DAILY PRN
Status: DISCONTINUED | OUTPATIENT
Start: 2024-06-10 | End: 2024-06-12

## 2024-06-10 RX ORDER — POLYETHYLENE GLYCOL 3350 17 G/17G
17 POWDER, FOR SOLUTION ORAL DAILY PRN
Status: DISCONTINUED | OUTPATIENT
Start: 2024-06-10 | End: 2024-06-13 | Stop reason: HOSPADM

## 2024-06-10 RX ORDER — SODIUM CHLORIDE 9 MG/ML
INJECTION, SOLUTION INTRAVENOUS PRN
Status: DISCONTINUED | OUTPATIENT
Start: 2024-06-10 | End: 2024-06-13 | Stop reason: HOSPADM

## 2024-06-10 RX ORDER — HYDROMORPHONE HYDROCHLORIDE 2 MG/1
8 TABLET ORAL EVERY 4 HOURS PRN
Status: DISCONTINUED | OUTPATIENT
Start: 2024-06-10 | End: 2024-06-13 | Stop reason: HOSPADM

## 2024-06-10 RX ORDER — ENOXAPARIN SODIUM 100 MG/ML
30 INJECTION SUBCUTANEOUS 2 TIMES DAILY
Status: DISCONTINUED | OUTPATIENT
Start: 2024-06-10 | End: 2024-06-13 | Stop reason: HOSPADM

## 2024-06-10 RX ORDER — KETAMINE HYDROCHLORIDE 50 MG/ML
0.2 INJECTION, SOLUTION INTRAMUSCULAR; INTRAVENOUS ONCE
Status: COMPLETED | OUTPATIENT
Start: 2024-06-10 | End: 2024-06-10

## 2024-06-10 RX ORDER — SODIUM CHLORIDE 0.9 % (FLUSH) 0.9 %
5-40 SYRINGE (ML) INJECTION PRN
Status: DISCONTINUED | OUTPATIENT
Start: 2024-06-10 | End: 2024-06-13 | Stop reason: HOSPADM

## 2024-06-10 RX ORDER — ALPRAZOLAM 0.5 MG/1
1 TABLET ORAL EVERY 4 HOURS PRN
Status: DISCONTINUED | OUTPATIENT
Start: 2024-06-10 | End: 2024-06-12

## 2024-06-10 RX ORDER — METHOCARBAMOL 500 MG/1
1000 TABLET, FILM COATED ORAL ONCE
Status: COMPLETED | OUTPATIENT
Start: 2024-06-10 | End: 2024-06-10

## 2024-06-10 RX ORDER — DRONABINOL 2.5 MG/1
5 CAPSULE ORAL 2 TIMES DAILY
Status: DISCONTINUED | OUTPATIENT
Start: 2024-06-10 | End: 2024-06-13 | Stop reason: HOSPADM

## 2024-06-10 RX ORDER — PROCHLORPERAZINE EDISYLATE 5 MG/ML
10 INJECTION INTRAMUSCULAR; INTRAVENOUS EVERY 6 HOURS PRN
Status: DISCONTINUED | OUTPATIENT
Start: 2024-06-10 | End: 2024-06-13 | Stop reason: HOSPADM

## 2024-06-10 RX ORDER — ACETAMINOPHEN 325 MG/1
650 TABLET ORAL EVERY 6 HOURS PRN
Status: DISCONTINUED | OUTPATIENT
Start: 2024-06-10 | End: 2024-06-12

## 2024-06-10 RX ORDER — OXYCODONE HYDROCHLORIDE 15 MG/1
30 TABLET, FILM COATED, EXTENDED RELEASE ORAL EVERY 12 HOURS SCHEDULED
Status: DISCONTINUED | OUTPATIENT
Start: 2024-06-10 | End: 2024-06-10

## 2024-06-10 RX ORDER — ONDANSETRON 4 MG/1
4 TABLET, ORALLY DISINTEGRATING ORAL EVERY 8 HOURS PRN
Status: DISCONTINUED | OUTPATIENT
Start: 2024-06-10 | End: 2024-06-13 | Stop reason: HOSPADM

## 2024-06-10 RX ORDER — ONDANSETRON 2 MG/ML
4 INJECTION INTRAMUSCULAR; INTRAVENOUS ONCE
Status: COMPLETED | OUTPATIENT
Start: 2024-06-10 | End: 2024-06-10

## 2024-06-10 RX ORDER — VENLAFAXINE HYDROCHLORIDE 37.5 MG/1
37.5 CAPSULE, EXTENDED RELEASE ORAL
Status: DISCONTINUED | OUTPATIENT
Start: 2024-06-11 | End: 2024-06-13 | Stop reason: HOSPADM

## 2024-06-10 RX ORDER — SODIUM CHLORIDE, SODIUM LACTATE, POTASSIUM CHLORIDE, AND CALCIUM CHLORIDE .6; .31; .03; .02 G/100ML; G/100ML; G/100ML; G/100ML
1000 INJECTION, SOLUTION INTRAVENOUS ONCE
Status: COMPLETED | OUTPATIENT
Start: 2024-06-10 | End: 2024-06-10

## 2024-06-10 RX ORDER — DEXTROAMPHETAMINE SACCHARATE, AMPHETAMINE ASPARTATE MONOHYDRATE, DEXTROAMPHETAMINE SULFATE AND AMPHETAMINE SULFATE 2.5; 2.5; 2.5; 2.5 MG/1; MG/1; MG/1; MG/1
10 CAPSULE, EXTENDED RELEASE ORAL DAILY
Status: DISCONTINUED | OUTPATIENT
Start: 2024-06-10 | End: 2024-06-13 | Stop reason: HOSPADM

## 2024-06-10 RX ORDER — MORPHINE SULFATE 30 MG/1
60 TABLET, FILM COATED, EXTENDED RELEASE ORAL EVERY 12 HOURS SCHEDULED
Status: DISCONTINUED | OUTPATIENT
Start: 2024-06-10 | End: 2024-06-13 | Stop reason: HOSPADM

## 2024-06-10 RX ORDER — MORPHINE SULFATE 4 MG/ML
4 INJECTION, SOLUTION INTRAMUSCULAR; INTRAVENOUS
Status: DISCONTINUED | OUTPATIENT
Start: 2024-06-10 | End: 2024-06-10

## 2024-06-10 RX ORDER — SODIUM CHLORIDE 0.9 % (FLUSH) 0.9 %
5-40 SYRINGE (ML) INJECTION EVERY 12 HOURS SCHEDULED
Status: DISCONTINUED | OUTPATIENT
Start: 2024-06-10 | End: 2024-06-13 | Stop reason: HOSPADM

## 2024-06-10 RX ORDER — PROMETHAZINE HYDROCHLORIDE 25 MG/1
25 TABLET ORAL EVERY 6 HOURS PRN
Status: DISCONTINUED | OUTPATIENT
Start: 2024-06-10 | End: 2024-06-13 | Stop reason: HOSPADM

## 2024-06-10 RX ORDER — FAMOTIDINE 20 MG/1
20 TABLET, FILM COATED ORAL 2 TIMES DAILY
Status: DISCONTINUED | OUTPATIENT
Start: 2024-06-10 | End: 2024-06-13 | Stop reason: HOSPADM

## 2024-06-10 RX ORDER — MORPHINE SULFATE 10 MG/ML
10 INJECTION, SOLUTION INTRAMUSCULAR; INTRAVENOUS ONCE
Status: COMPLETED | OUTPATIENT
Start: 2024-06-10 | End: 2024-06-10

## 2024-06-10 RX ORDER — ACETAMINOPHEN 650 MG/1
650 SUPPOSITORY RECTAL EVERY 6 HOURS PRN
Status: DISCONTINUED | OUTPATIENT
Start: 2024-06-10 | End: 2024-06-10

## 2024-06-10 RX ORDER — MORPHINE SULFATE 4 MG/ML
INJECTION, SOLUTION INTRAMUSCULAR; INTRAVENOUS
Status: DISCONTINUED
Start: 2024-06-10 | End: 2024-06-10 | Stop reason: WASHOUT

## 2024-06-10 RX ADMIN — ENOXAPARIN SODIUM 30 MG: 100 INJECTION SUBCUTANEOUS at 20:22

## 2024-06-10 RX ADMIN — METHOCARBAMOL TABLETS 1000 MG: 500 TABLET, COATED ORAL at 19:17

## 2024-06-10 RX ADMIN — PROCHLORPERAZINE EDISYLATE 10 MG: 5 INJECTION INTRAMUSCULAR; INTRAVENOUS at 23:03

## 2024-06-10 RX ADMIN — HYDROMORPHONE HYDROCHLORIDE 8 MG: 2 TABLET ORAL at 22:21

## 2024-06-10 RX ADMIN — KETAMINE HYDROCHLORIDE 20 MG: 50 INJECTION INTRAMUSCULAR; INTRAVENOUS at 20:23

## 2024-06-10 RX ADMIN — MORPHINE SULFATE 10 MG: 10 INJECTION, SOLUTION INTRAMUSCULAR; INTRAVENOUS at 17:03

## 2024-06-10 RX ADMIN — GABAPENTIN 400 MG: 100 CAPSULE ORAL at 20:22

## 2024-06-10 RX ADMIN — CYCLOBENZAPRINE 10 MG: 10 TABLET, FILM COATED ORAL at 22:21

## 2024-06-10 RX ADMIN — SODIUM CHLORIDE, PRESERVATIVE FREE 10 ML: 5 INJECTION INTRAVENOUS at 22:38

## 2024-06-10 RX ADMIN — PROMETHAZINE HYDROCHLORIDE 25 MG: 25 TABLET ORAL at 21:34

## 2024-06-10 RX ADMIN — MORPHINE SULFATE 60 MG: 30 TABLET, FILM COATED, EXTENDED RELEASE ORAL at 22:34

## 2024-06-10 RX ADMIN — SODIUM CHLORIDE, POTASSIUM CHLORIDE, SODIUM LACTATE AND CALCIUM CHLORIDE 1000 ML: 600; 310; 30; 20 INJECTION, SOLUTION INTRAVENOUS at 17:05

## 2024-06-10 RX ADMIN — HYDROMORPHONE HYDROCHLORIDE 2 MG: 1 INJECTION, SOLUTION INTRAMUSCULAR; INTRAVENOUS; SUBCUTANEOUS at 17:51

## 2024-06-10 RX ADMIN — ONDANSETRON 4 MG: 2 INJECTION INTRAMUSCULAR; INTRAVENOUS at 20:53

## 2024-06-10 RX ADMIN — DRONABINOL 5 MG: 2.5 CAPSULE ORAL at 22:34

## 2024-06-10 RX ADMIN — FAMOTIDINE 20 MG: 20 TABLET, FILM COATED ORAL at 20:31

## 2024-06-10 RX ADMIN — ALPRAZOLAM 1 MG: 0.5 TABLET ORAL at 23:03

## 2024-06-10 RX ADMIN — METOPROLOL TARTRATE 25 MG: 25 TABLET, FILM COATED ORAL at 20:23

## 2024-06-10 ASSESSMENT — PAIN DESCRIPTION - LOCATION
LOCATION: LEG;HIP
LOCATION: HIP;LEG

## 2024-06-10 ASSESSMENT — PAIN DESCRIPTION - DESCRIPTORS
DESCRIPTORS: ACHING
DESCRIPTORS: ACHING
DESCRIPTORS: SPASM;SHARP
DESCRIPTORS: SHARP
DESCRIPTORS: ACHING;SHARP

## 2024-06-10 ASSESSMENT — PAIN DESCRIPTION - PAIN TYPE
TYPE: ACUTE PAIN;CHRONIC PAIN
TYPE: ACUTE PAIN
TYPE: ACUTE PAIN;CHRONIC PAIN

## 2024-06-10 ASSESSMENT — PAIN SCALES - GENERAL
PAINLEVEL_OUTOF10: 4
PAINLEVEL_OUTOF10: 10
PAINLEVEL_OUTOF10: 10
PAINLEVEL_OUTOF10: 8

## 2024-06-10 ASSESSMENT — PAIN - FUNCTIONAL ASSESSMENT
PAIN_FUNCTIONAL_ASSESSMENT: PREVENTS OR INTERFERES WITH MANY ACTIVE NOT PASSIVE ACTIVITIES
PAIN_FUNCTIONAL_ASSESSMENT: PREVENTS OR INTERFERES WITH ALL ACTIVE AND SOME PASSIVE ACTIVITIES
PAIN_FUNCTIONAL_ASSESSMENT: PREVENTS OR INTERFERES SOME ACTIVE ACTIVITIES AND ADLS

## 2024-06-10 ASSESSMENT — PAIN DESCRIPTION - ONSET
ONSET: ON-GOING
ONSET: PROGRESSIVE
ONSET: ON-GOING

## 2024-06-10 ASSESSMENT — PAIN DESCRIPTION - ORIENTATION
ORIENTATION: RIGHT

## 2024-06-10 ASSESSMENT — PAIN DESCRIPTION - FREQUENCY
FREQUENCY: CONTINUOUS

## 2024-06-10 NOTE — ED NOTES
Bedside and Verbal shift change report given to TOMAS Fink (oncoming nurse) by Vaughn MARIN (offgoing nurse). Report included the following information Nurse Handoff Report, ED Encounter Summary, and MAR.

## 2024-06-10 NOTE — ED PROVIDER NOTES
CYCLOBENZAPRINE (FLEXERIL) 10 MG TABLET    Take 10 mg by mouth 3 times daily as needed    DULOXETINE (CYMBALTA) 60 MG EXTENDED RELEASE CAPSULE    30 mg    ERGOCALCIFEROL (ERGOCALCIFEROL) 1.25 MG (85080 UT) CAPSULE    ergocalciferol (vitamin D2) 1,250 mcg (50,000 unit) capsule   1 po once per week    FAMOTIDINE (PEPCID) 40 MG TABLET    famotidine 40 mg tablet   Take 1 tablet every day by oral route.    FOLIC ACID (FOLVITE) 1 MG TABLET    Take 2 mg by mouth daily    FUROSEMIDE (LASIX) 40 MG TABLET    furosemide 40 mg tablet   TAKE ONE TABLET BY MOUTH DAILY AS NEEDED    GABAPENTIN (NEURONTIN) 600 MG TABLET    Take 600 mg by mouth 3 times daily.    HYDROCODONE (HYSINGLA ER) 60 MG EXTENDED RELEASE TABLET    ceived the following from [facility]: Outside name: Hysingla ER 60 mg ER tablet    HYDROCORTISONE 2.5 % CREAM    Anusol-HC 2.5 % rectal cream with applicator   Insert 1 applicatorful twice a day by rectal route.    HYDROXYZINE PAMOATE (VISTARIL) 25 MG CAPSULE    hydroxyzine pamoate 25 mg capsule   TAKE ONE CAPSULE BY MOUTH Three times a day as needed for anxiety    LEVOCARNITINE L-TARTRATE 500 MG CAPS    Take 1,500 mg by mouth 3 times daily    MIDAZOLAM (VERSED) 2 MG/2ML SOLN INJECTION    2 mg.    MORPHINE (AVINZA) 30 MG EXTENDED RELEASE CAPSULE    morphine ER 30 mg capsule,extended release 24 hr multiphase    NALOXONE (NARCAN) 4 MG/0.1ML LIQD NASAL SPRAY    4 mg by Nasal route once as needed    POLYETHYLENE GLYCOL (GLYCOLAX) 17 GM/SCOOP POWDER    Please take 1 tsp in 4 ounces of liquid every hour until stools are clear    PREDNISONE 10 MG (21) TBPK    prednisone 10 mg tablets in a dose pack   TAKE 6 TABLETS BY MOUTH ON DAY 1, 5 TABLETS ON DAY 2, 4 TABLETS ON DAY 3, 3 TABLETS ON DAY 4, 2 TABLETS ON DAY 5, 1 TABLET ON DAY 6    PROMETHAZINE (PHENERGAN) 25 MG TABLET    ceived the following from [facility]: Outside name: promethazine (PHENERGAN) 25 mg tablet    TURMERIC PO    Take 2  not returned as of this dictation.    EMERGENCY DEPARTMENT COURSE and DIFFERENTIAL DIAGNOSIS/MDM:   Vitals:    Vitals:    06/10/24 1645 06/10/24 1651 06/10/24 1745   BP: (!) 153/94  (!) 155/80   Pulse: (!) 140     Resp: 18     Temp: 98.8 °F (37.1 °C)     TempSrc: Oral     SpO2: 92%  92%   Weight:  108.9 kg (240 lb)            Medical Decision Making  Amount and/or Complexity of Data Reviewed  Labs: ordered. Decision-making details documented in ED Course.  Radiology: ordered.    Risk  Prescription drug management.  Decision regarding hospitalization.        Intractable Pain and Tachycardia  34 year old female with complex medical history including generalized hypermobility spectrum disorder, psoriatic arthritis, prior right hip replacement, chronic pain who presents to the emergency department for evaluation of right hip and knee pain.  Long term palliative care patient with chronic high dose opioid use.  Patient reports that she had a fall from her bed this morning around 2 AM.  Complains that she has had ongoing severe, constant right hip pain that radiates down through her knee.  States that she has been trying to get out of bed and felt weak.  Fell to the floor.  States that she has been unable to walk since this injury. Here for acute injury with fall, denies any recent infectious symptoms or other illness. X ray and CT imaging without obvious fractures. Persistently tachycardic to the 120s-130s, complaining of severe pain and inability to walk.       REASSESSMENT   MEDICATIONS GIVEN:   Medications   methocarbamol (ROBAXIN) tablet 1,000 mg (has no administration in time range)   ketamine (KETALAR) injection 20 mg (has no administration in time range)   morphine (PF) injection 10 mg (10 mg IntraVENous Given 6/10/24 1703)   lactated ringers bolus 1,000 mL (1,000 mLs IntraVENous New Bag 6/10/24 1705)   HYDROmorphone (DILAUDID) injection 2 mg (2 mg IntraVENous Given 6/10/24 1751)       ED Course as of 06/10/24 1911

## 2024-06-10 NOTE — H&P
[facility]    Hospitalist Admission Note                                                                                                                                  NAME:  Ericka Patton   :   1990   MRN:  [id number]     PCP:  Aman Gordon MD     Date/Time of service:  6/10/2024 7:36 PM  To assist coordination of care and communication with nursing and staff, this note may be preliminary early in the day, but finalized by end of the day.        Subjective:     CHIEF COMPLAINT: pain and weakness     HISTORY OF PRESENT ILLNESS:     Ms. Patton is a 34 y.o. female who presented to the Emergency Department complaining of pain and weakness, mainly of RLE.  Worse after a fall today.  This are severe chronic issues.  Palliative care manages significant polypharmacy.  Hospice has been pursued many times.  ER finds no fractures or metabolic changes.  She states she cannot go home.  We will admit her for observation.    Past Medical History:   Diagnosis Date    ADHD (attention deficit hyperactivity disorder)     dx Dr. Butler in [place]    Arthritis     Carnitine deficiency (HCC)     Chronic pain     Iggy-Danlos syndrome     Fibromyalgia     GERD (gastroesophageal reflux disease)     HSP (Henoch Schonlein purpura) (HCC)     Mitochondrial disease (HCC)     Morbid obesity (HCC)     Muscle pain     Muscle weakness     Myopathy 2005    Genetic/metabolic? mitochondrial disorder,  [facility] muscle biopsy    Polypharmacy     VSD (ventricular septal defect)     spontaneous closure        Past Surgical History:   Procedure Laterality Date    ANTERIOR CRUCIATE LIGAMENT REPAIR      field hockey injury    COLONOSCOPY N/A 2022    COLONOSCOPY  needs rapid covid performed by Esau Cardenas MD at [facility] ENDOSCOPY    DILATION AND EVACUATION OF UTERUS      2016     LAMINECTOMY,CERVICAL  3/2021    LUMBAR FUSION  2017    L4L5 LUMBAR FUSION WITH LAMINECTOMY     MUSCLE BIOPSY

## 2024-06-10 NOTE — ED NOTES
Pt voicing concern that she is not being treated for her pain. Pt has requested more pain medications and a muscle relaxer.  Dr. Johansen aware.

## 2024-06-10 NOTE — ED NOTES
Rounding on pt and pt is writhing in the bed, aggressively rubbing her right upper leg.  Reported to pt that the xray is normal and pt states then I need an MRI now.  Pt states that her pain level is 10/10.  Dr. Johansen notified of situation.

## 2024-06-10 NOTE — ED TRIAGE NOTES
Pt reports feeling weak when getting out of bed last night around 2am, was able to get back in bed and has been taking prescribed medication. Pt took Morphine 60mg ER at 5am, Hydromorphone 4mg at 0930, oxycodone 10mg at the same time as Hydromorphone, and gabapentin 1200mg at 9am

## 2024-06-11 ENCOUNTER — HOSPITAL ENCOUNTER (OUTPATIENT)
Facility: HOSPITAL | Age: 34
Setting detail: OBSERVATION
Discharge: HOME | DRG: 563 | End: 2024-06-14
Payer: MEDICARE

## 2024-06-11 ENCOUNTER — APPOINTMENT (OUTPATIENT)
Facility: HOSPITAL | Age: 34
DRG: 563 | End: 2024-06-11
Payer: MEDICARE

## 2024-06-11 LAB
-: NORMAL
ALBUMIN SERPL-MCNC: 3.6 G/DL (ref 3.5–5)
ALBUMIN/GLOB SERPL: 1.2 (ref 1.1–2.2)
ALP SERPL-CCNC: 62 U/L (ref 45–117)
ALT SERPL-CCNC: 257 U/L (ref 12–78)
AMPHET UR QL SCN: POSITIVE
ANION GAP SERPL CALC-SCNC: 4 MMOL/L (ref 5–15)
APPEARANCE UR: CLEAR
AST SERPL-CCNC: 30 U/L (ref 15–37)
BARBITURATES UR QL SCN: NEGATIVE
BENZODIAZ UR QL: POSITIVE
BILIRUB SERPL-MCNC: 0.3 MG/DL (ref 0.2–1)
BILIRUB UR QL: NEGATIVE
BUN SERPL-MCNC: 13 MG/DL (ref 6–20)
BUN/CREAT SERPL: 19 (ref 12–20)
CALCIUM SERPL-MCNC: 9 MG/DL (ref 8.5–10.1)
CANNABINOIDS UR QL SCN: POSITIVE
CHLORIDE SERPL-SCNC: 103 MMOL/L (ref 97–108)
CO2 SERPL-SCNC: 32 MMOL/L (ref 21–32)
COCAINE UR QL SCN: NEGATIVE
COLOR UR: NORMAL
COMMENT:: NORMAL
CREAT SERPL-MCNC: 0.7 MG/DL (ref 0.55–1.02)
ERYTHROCYTE [DISTWIDTH] IN BLOOD BY AUTOMATED COUNT: 12.7 % (ref 11.5–14.5)
ETHANOL SERPL-MCNC: <10 MG/DL (ref 0–0.08)
GLOBULIN SER CALC-MCNC: 2.9 G/DL (ref 2–4)
GLUCOSE SERPL-MCNC: 98 MG/DL (ref 65–100)
GLUCOSE UR STRIP.AUTO-MCNC: NEGATIVE MG/DL
HAV IGM SER QL: NONREACTIVE
HBV CORE IGM SER QL: NONREACTIVE
HBV SURFACE AG SER QL: <0.1 INDEX
HBV SURFACE AG SER QL: NEGATIVE
HCT VFR BLD AUTO: 35.5 % (ref 35–47)
HCV AB SER IA-ACNC: <0.02 INDEX
HCV AB SERPL QL IA: NONREACTIVE
HGB BLD-MCNC: 11.7 G/DL (ref 11.5–16)
HGB UR QL STRIP: NEGATIVE
KETONES UR QL STRIP.AUTO: NEGATIVE MG/DL
LEUKOCYTE ESTERASE UR QL STRIP.AUTO: NEGATIVE
Lab: ABNORMAL
MAGNESIUM SERPL-MCNC: 2.4 MG/DL (ref 1.6–2.4)
MCH RBC QN AUTO: 29.7 PG (ref 26–34)
MCHC RBC AUTO-ENTMCNC: 33 G/DL (ref 30–36.5)
MCV RBC AUTO: 90.1 FL (ref 80–99)
METHADONE UR QL: NEGATIVE
NITRITE UR QL STRIP.AUTO: NEGATIVE
NRBC # BLD: 0 K/UL (ref 0–0.01)
NRBC BLD-RTO: 0 PER 100 WBC
OPIATES UR QL: POSITIVE
PCP UR QL: NEGATIVE
PH UR STRIP: 6.5 (ref 5–8)
PHOSPHATE SERPL-MCNC: 3.9 MG/DL (ref 2.6–4.7)
PLATELET # BLD AUTO: 265 K/UL (ref 150–400)
PMV BLD AUTO: 10.6 FL (ref 8.9–12.9)
POTASSIUM SERPL-SCNC: 3.8 MMOL/L (ref 3.5–5.1)
PROCALCITONIN SERPL-MCNC: 0.14 NG/ML
PROT SERPL-MCNC: 6.5 G/DL (ref 6.4–8.2)
PROT UR STRIP-MCNC: NEGATIVE MG/DL
RBC # BLD AUTO: 3.94 M/UL (ref 3.8–5.2)
SODIUM SERPL-SCNC: 139 MMOL/L (ref 136–145)
SP GR UR REFRACTOMETRY: 1.01 (ref 1–1.03)
SPECIMEN HOLD: NORMAL
UROBILINOGEN UR QL STRIP.AUTO: 0.2 EU/DL (ref 0.2–1)
WBC # BLD AUTO: 10 K/UL (ref 3.6–11)

## 2024-06-11 PROCEDURE — 6370000000 HC RX 637 (ALT 250 FOR IP): Performed by: INTERNAL MEDICINE

## 2024-06-11 PROCEDURE — 96376 TX/PRO/DX INJ SAME DRUG ADON: CPT

## 2024-06-11 PROCEDURE — 6360000002 HC RX W HCPCS: Performed by: FAMILY MEDICINE

## 2024-06-11 PROCEDURE — G0378 HOSPITAL OBSERVATION PER HR: HCPCS

## 2024-06-11 PROCEDURE — 99222 1ST HOSP IP/OBS MODERATE 55: CPT | Performed by: NURSE PRACTITIONER

## 2024-06-11 PROCEDURE — 97165 OT EVAL LOW COMPLEX 30 MIN: CPT

## 2024-06-11 PROCEDURE — 97530 THERAPEUTIC ACTIVITIES: CPT

## 2024-06-11 PROCEDURE — 82077 ASSAY SPEC XCP UR&BREATH IA: CPT

## 2024-06-11 PROCEDURE — 87086 URINE CULTURE/COLONY COUNT: CPT

## 2024-06-11 PROCEDURE — 73721 MRI JNT OF LWR EXTRE W/O DYE: CPT

## 2024-06-11 PROCEDURE — 80053 COMPREHEN METABOLIC PANEL: CPT

## 2024-06-11 PROCEDURE — 6370000000 HC RX 637 (ALT 250 FOR IP)

## 2024-06-11 PROCEDURE — 6370000000 HC RX 637 (ALT 250 FOR IP): Performed by: FAMILY MEDICINE

## 2024-06-11 PROCEDURE — 85027 COMPLETE CBC AUTOMATED: CPT

## 2024-06-11 PROCEDURE — 83735 ASSAY OF MAGNESIUM: CPT

## 2024-06-11 PROCEDURE — 84145 PROCALCITONIN (PCT): CPT

## 2024-06-11 PROCEDURE — 6360000002 HC RX W HCPCS: Performed by: INTERNAL MEDICINE

## 2024-06-11 PROCEDURE — 36415 COLL VENOUS BLD VENIPUNCTURE: CPT

## 2024-06-11 PROCEDURE — 81002 URINALYSIS NONAUTO W/O SCOPE: CPT

## 2024-06-11 PROCEDURE — 84100 ASSAY OF PHOSPHORUS: CPT

## 2024-06-11 PROCEDURE — 94761 N-INVAS EAR/PLS OXIMETRY MLT: CPT

## 2024-06-11 PROCEDURE — 96375 TX/PRO/DX INJ NEW DRUG ADDON: CPT

## 2024-06-11 PROCEDURE — 80074 ACUTE HEPATITIS PANEL: CPT

## 2024-06-11 PROCEDURE — 80307 DRUG TEST PRSMV CHEM ANLYZR: CPT

## 2024-06-11 PROCEDURE — 97161 PT EVAL LOW COMPLEX 20 MIN: CPT

## 2024-06-11 PROCEDURE — 6360000002 HC RX W HCPCS

## 2024-06-11 PROCEDURE — 97116 GAIT TRAINING THERAPY: CPT

## 2024-06-11 PROCEDURE — 72148 MRI LUMBAR SPINE W/O DYE: CPT

## 2024-06-11 PROCEDURE — 2580000003 HC RX 258: Performed by: INTERNAL MEDICINE

## 2024-06-11 RX ORDER — SENNA AND DOCUSATE SODIUM 50; 8.6 MG/1; MG/1
2 TABLET, FILM COATED ORAL DAILY PRN
Status: DISCONTINUED | OUTPATIENT
Start: 2024-06-11 | End: 2024-06-13 | Stop reason: HOSPADM

## 2024-06-11 RX ORDER — OXYCODONE HYDROCHLORIDE 5 MG/1
10 TABLET ORAL EVERY 4 HOURS PRN
Status: DISCONTINUED | OUTPATIENT
Start: 2024-06-11 | End: 2024-06-13 | Stop reason: HOSPADM

## 2024-06-11 RX ORDER — GABAPENTIN 300 MG/1
1200 CAPSULE ORAL 3 TIMES DAILY
Status: DISCONTINUED | OUTPATIENT
Start: 2024-06-11 | End: 2024-06-13 | Stop reason: HOSPADM

## 2024-06-11 RX ORDER — DIAZEPAM 5 MG/ML
5 INJECTION, SOLUTION INTRAMUSCULAR; INTRAVENOUS
Status: COMPLETED | OUTPATIENT
Start: 2024-06-11 | End: 2024-06-11

## 2024-06-11 RX ORDER — CLONIDINE HYDROCHLORIDE 0.1 MG/1
0.1 TABLET ORAL 4 TIMES DAILY
Status: DISCONTINUED | OUTPATIENT
Start: 2024-06-11 | End: 2024-06-13 | Stop reason: HOSPADM

## 2024-06-11 RX ORDER — OXYCODONE HYDROCHLORIDE 5 MG/1
10 CAPSULE ORAL EVERY 4 HOURS PRN
COMMUNITY

## 2024-06-11 RX ADMIN — SODIUM CHLORIDE, PRESERVATIVE FREE 10 ML: 5 INJECTION INTRAVENOUS at 09:11

## 2024-06-11 RX ADMIN — FAMOTIDINE 20 MG: 20 TABLET, FILM COATED ORAL at 21:30

## 2024-06-11 RX ADMIN — ONDANSETRON 4 MG: 2 INJECTION INTRAMUSCULAR; INTRAVENOUS at 02:09

## 2024-06-11 RX ADMIN — ALPRAZOLAM 1 MG: 0.5 TABLET ORAL at 06:29

## 2024-06-11 RX ADMIN — GABAPENTIN 1200 MG: 300 CAPSULE ORAL at 13:45

## 2024-06-11 RX ADMIN — DEXTROAMPHETAMINE SACCHARATE, AMPHETAMINE ASPARTATE MONOHYDRATE, DEXTROAMPHETAMINE SULFATE AND AMPHETAMINE SULFATE 10 MG: 2.5; 2.5; 2.5; 2.5 CAPSULE, EXTENDED RELEASE ORAL at 09:11

## 2024-06-11 RX ADMIN — CLONIDINE HYDROCHLORIDE 0.1 MG: 0.1 TABLET ORAL at 12:05

## 2024-06-11 RX ADMIN — HYDROMORPHONE HYDROCHLORIDE 8 MG: 2 TABLET ORAL at 21:58

## 2024-06-11 RX ADMIN — ACETAMINOPHEN 650 MG: 325 TABLET ORAL at 10:45

## 2024-06-11 RX ADMIN — GABAPENTIN 400 MG: 100 CAPSULE ORAL at 08:58

## 2024-06-11 RX ADMIN — DIAZEPAM 5 MG: 10 INJECTION, SOLUTION INTRAMUSCULAR; INTRAVENOUS at 16:30

## 2024-06-11 RX ADMIN — ALPRAZOLAM 1 MG: 0.5 TABLET ORAL at 03:04

## 2024-06-11 RX ADMIN — MORPHINE SULFATE 60 MG: 30 TABLET, FILM COATED, EXTENDED RELEASE ORAL at 08:59

## 2024-06-11 RX ADMIN — METOPROLOL TARTRATE 25 MG: 25 TABLET, FILM COATED ORAL at 21:29

## 2024-06-11 RX ADMIN — HYDROMORPHONE HYDROCHLORIDE 8 MG: 2 TABLET ORAL at 02:09

## 2024-06-11 RX ADMIN — ALPRAZOLAM 1 MG: 0.5 TABLET ORAL at 14:56

## 2024-06-11 RX ADMIN — ALPRAZOLAM 1 MG: 0.5 TABLET ORAL at 18:29

## 2024-06-11 RX ADMIN — FAMOTIDINE 20 MG: 20 TABLET, FILM COATED ORAL at 08:59

## 2024-06-11 RX ADMIN — DRONABINOL 5 MG: 2.5 CAPSULE ORAL at 08:59

## 2024-06-11 RX ADMIN — HYDROMORPHONE HYDROCHLORIDE 8 MG: 2 TABLET ORAL at 14:56

## 2024-06-11 RX ADMIN — HYDROMORPHONE HYDROCHLORIDE 8 MG: 2 TABLET ORAL at 10:45

## 2024-06-11 RX ADMIN — PROCHLORPERAZINE EDISYLATE 10 MG: 5 INJECTION INTRAMUSCULAR; INTRAVENOUS at 22:21

## 2024-06-11 RX ADMIN — CLONIDINE HYDROCHLORIDE 0.1 MG: 0.1 TABLET ORAL at 21:30

## 2024-06-11 RX ADMIN — METOPROLOL TARTRATE 25 MG: 25 TABLET, FILM COATED ORAL at 08:59

## 2024-06-11 RX ADMIN — HYDROMORPHONE HYDROCHLORIDE 8 MG: 2 TABLET ORAL at 06:29

## 2024-06-11 RX ADMIN — OXYCODONE HYDROCHLORIDE 10 MG: 5 TABLET ORAL at 16:12

## 2024-06-11 RX ADMIN — PROCHLORPERAZINE EDISYLATE 10 MG: 5 INJECTION INTRAMUSCULAR; INTRAVENOUS at 13:48

## 2024-06-11 RX ADMIN — DRONABINOL 5 MG: 2.5 CAPSULE ORAL at 21:29

## 2024-06-11 RX ADMIN — CLONIDINE HYDROCHLORIDE 0.1 MG: 0.1 TABLET ORAL at 18:29

## 2024-06-11 RX ADMIN — CYCLOBENZAPRINE 10 MG: 10 TABLET, FILM COATED ORAL at 22:02

## 2024-06-11 RX ADMIN — DICLOFENAC SODIUM 2 G: 10 GEL TOPICAL at 04:52

## 2024-06-11 RX ADMIN — PROCHLORPERAZINE EDISYLATE 10 MG: 5 INJECTION INTRAMUSCULAR; INTRAVENOUS at 05:21

## 2024-06-11 RX ADMIN — OXYCODONE HYDROCHLORIDE 10 MG: 5 TABLET ORAL at 12:05

## 2024-06-11 RX ADMIN — CYCLOBENZAPRINE 10 MG: 10 TABLET, FILM COATED ORAL at 04:50

## 2024-06-11 RX ADMIN — ALPRAZOLAM 1 MG: 0.5 TABLET ORAL at 22:01

## 2024-06-11 RX ADMIN — ALPRAZOLAM 1 MG: 0.5 TABLET ORAL at 11:45

## 2024-06-11 RX ADMIN — SODIUM CHLORIDE, PRESERVATIVE FREE 10 ML: 5 INJECTION INTRAVENOUS at 21:30

## 2024-06-11 RX ADMIN — ACETAMINOPHEN 650 MG: 325 TABLET ORAL at 22:00

## 2024-06-11 RX ADMIN — MORPHINE SULFATE 60 MG: 30 TABLET, FILM COATED, EXTENDED RELEASE ORAL at 21:30

## 2024-06-11 RX ADMIN — GABAPENTIN 1200 MG: 300 CAPSULE ORAL at 21:30

## 2024-06-11 RX ADMIN — CYCLOBENZAPRINE 10 MG: 10 TABLET, FILM COATED ORAL at 13:30

## 2024-06-11 RX ADMIN — HYDROMORPHONE HYDROCHLORIDE 8 MG: 2 TABLET ORAL at 18:29

## 2024-06-11 ASSESSMENT — PAIN DESCRIPTION - LOCATION
LOCATION: LEG;HIP
LOCATION: HIP
LOCATION: LEG;HIP
LOCATION: LEG;KNEE
LOCATION: LEG
LOCATION: HIP
LOCATION: KNEE;BACK
LOCATION: HIP;LEG
LOCATION: HIP
LOCATION: KNEE;BACK;HIP
LOCATION: HIP
LOCATION: BACK
LOCATION: HEAD
LOCATION: BACK;HIP;KNEE
LOCATION: BACK
LOCATION: KNEE;BACK

## 2024-06-11 ASSESSMENT — PAIN - FUNCTIONAL ASSESSMENT
PAIN_FUNCTIONAL_ASSESSMENT: PREVENTS OR INTERFERES SOME ACTIVE ACTIVITIES AND ADLS

## 2024-06-11 ASSESSMENT — PAIN SCALES - GENERAL
PAINLEVEL_OUTOF10: 0
PAINLEVEL_OUTOF10: 9
PAINLEVEL_OUTOF10: 7
PAINLEVEL_OUTOF10: 10
PAINLEVEL_OUTOF10: 10
PAINLEVEL_OUTOF10: 0
PAINLEVEL_OUTOF10: 7
PAINLEVEL_OUTOF10: 7
PAINLEVEL_OUTOF10: 8
PAINLEVEL_OUTOF10: 7
PAINLEVEL_OUTOF10: 9
PAINLEVEL_OUTOF10: 10
PAINLEVEL_OUTOF10: 7
PAINLEVEL_OUTOF10: 8
PAINLEVEL_OUTOF10: 7
PAINLEVEL_OUTOF10: 7
PAINLEVEL_OUTOF10: 9
PAINLEVEL_OUTOF10: 8
PAINLEVEL_OUTOF10: 3
PAINLEVEL_OUTOF10: 3

## 2024-06-11 ASSESSMENT — PAIN DESCRIPTION - DESCRIPTORS
DESCRIPTORS: ACHING
DESCRIPTORS: SHARP
DESCRIPTORS: ACHING
DESCRIPTORS: ACHING;SHARP;STABBING
DESCRIPTORS: SHARP;STABBING;SHOOTING
DESCRIPTORS: ACHING
DESCRIPTORS: ACHING;SORE
DESCRIPTORS: ACHING;DULL;DISCOMFORT
DESCRIPTORS: THROBBING;ACHING
DESCRIPTORS: ACHING
DESCRIPTORS: SHARP;SHOOTING
DESCRIPTORS: SHARP;THROBBING
DESCRIPTORS: ACHING;SORE
DESCRIPTORS: SHARP;STABBING;THROBBING
DESCRIPTORS: ACHING;SORE
DESCRIPTORS: ACHING
DESCRIPTORS: ACHING
DESCRIPTORS: ACHING;STABBING

## 2024-06-11 ASSESSMENT — PAIN DESCRIPTION - PAIN TYPE
TYPE: ACUTE PAIN;CHRONIC PAIN
TYPE: ACUTE PAIN;CHRONIC PAIN
TYPE: CHRONIC PAIN
TYPE: CHRONIC PAIN
TYPE: ACUTE PAIN;CHRONIC PAIN

## 2024-06-11 ASSESSMENT — PAIN DESCRIPTION - ORIENTATION
ORIENTATION: RIGHT
ORIENTATION: RIGHT
ORIENTATION: MID;ANTERIOR
ORIENTATION: RIGHT;LOWER
ORIENTATION: RIGHT;POSTERIOR
ORIENTATION: RIGHT;LOWER
ORIENTATION: RIGHT
ORIENTATION: RIGHT
ORIENTATION: RIGHT;LEFT
ORIENTATION: RIGHT
ORIENTATION: RIGHT;LOWER
ORIENTATION: RIGHT
ORIENTATION: RIGHT;POSTERIOR
ORIENTATION: RIGHT
ORIENTATION: RIGHT
ORIENTATION: RIGHT;POSTERIOR

## 2024-06-11 NOTE — ED NOTES
The pt vomited a large amount of liquid emesis.  Medicated as ordered and pt transported to [facility].

## 2024-06-11 NOTE — ED NOTES
TRANSFER - OUT REPORT:    Verbal report given to Stephanie MARIN on Ericka Patton  being transferred to [facility]   for urgent transfer       Report consisted of patient's Situation, Background, Assessment and   Recommendations(SBAR).     Information from the following report(s) ED Encounter Summary, MAR, and Recent Results was reviewed with the receiving nurse.    [place] Fall Assessment:                           Lines:   Peripheral IV 06/10/24 Proximal;Right;Anterior Forearm (Active)        Opportunity for questions and clarification was provided.      Patient transported with:      EMS

## 2024-06-11 NOTE — CARE COORDINATION
Care Management Initial Assessment  6/11/2024 1:33 PM  If patient is discharged prior to next notation, then this note serves as note for discharge by case management.    Reason for Admission:   Tachycardia [R00.0]  Right hip pain [M25.551]  Hip pain, bilateral [M25.551, M25.552]  Fall from bed, initial encounter [W06.XXXA]  Acute pain of right knee [M25.561]         Patient Admission Status: Observation  RUR: No data recorded  Hospitalization in the last 30 days (Readmission):  No        Advance Care Planning:  Code Status: Full Code  Primary Healthcare Decision Maker: (P) Legal Next of Kin  Primary Decision Maker: Sumit Miramontes - Parent - [phone]        __________________________________________________________________________  Assessment:      06/11/24 1329   Service Assessment   Patient Orientation Alert and Oriented   Cognition Alert   History Provided By Patient;Other (see comment)  (mother Sumit)   Primary Caregiver Family   Accompanied By/Relationship mother Sumit   Support Systems Family Members;Parent   Patient's Healthcare Decision Maker is: Legal Next of Kin   PCP Verified by CM Yes   Prior Functional Level Assistance with the following:;Mobility;Housework;Cooking   Current Functional Level Assistance with the following:;Cooking;Housework;Mobility   Can patient return to prior living arrangement Yes   Ability to make needs known: Good   Family able to assist with home care needs: Yes   Would you like for me to discuss the discharge plan with any other family members/significant others, and if so, who? Yes  (mother Sumit)   Social/Functional History   Lives With Parent;Family   Type of Home House   Home Layout Multi-level   Home Access Level entry   Home Equipment Grab bars;Cane;Wheelchair - Electric;Rollator   Receives Help From Family   ADL Assistance Independent   Ambulation Assistance Needs assistance   Occupation On disability   Discharge Planning   Type of Residence House  [address] [address]   4/1/2021 - None Entered    [place] [address]         Subscriber Name Subscriber Birth Date Member ID       ALANNA ROMERO 1990 [id number]                     PCP: Aman Gordon MD   Address: [address] [place] [address] / [facility] [address]   Phone number: [phone]    Pharmacy:   [address] -  [phone] - F [phone]  [address] [facility] [address]  Phone: [phone] Fax: [phone]    [place] Transport:  Family will transport at [place]       Transition of care plan:    []Unable to determine at this time. Awaiting clinical progress, and disposition recommendations.    [] Home. No assistance required.     [] Home. Pt refused recommended services.    [x] Home with family assistance as needed, and outpatient follow-up.    [] Home with Outpatient PT and outpatient follow-up   Pt aware of OP appt? []Yes, Provider:   []Not scheduled   Transport provider:     [] Home with outpatient services.    Specify:    [] Home with Home Health   - [place] of Choice offered? [] Yes, Preference:   [] NA    []SNF/IPR   -[]Freedom of Choice offered, and preferences given:   []Listing provided and preferences requested   -Status: []Pending []Accepted:    -Auth required: []Yes []No    -Auth initiated date:   -3 midnight stay required: []Yes []No  Date satisfied:     [] LTC:     [] Home with Hospice   - [place] of Choice offered? [] Yes, Preference:   [] NA    [] [facility] information provided.     [] Other:       Allyssa Pacheco  Case Management Department  For questions or concerns, please PerfectServe

## 2024-06-11 NOTE — PLAN OF CARE
Problem: Physical Therapy - Adult  Goal: By Discharge: Performs mobility at highest level of function for planned discharge setting.  See evaluation for individualized goals.  Description: FUNCTIONAL STATUS PRIOR TO ADMISSION: Patient was modified independent using a rollator for functional mobility.    HOME SUPPORT PRIOR TO ADMISSION: The patient lived with family but did not require assistance.    Physical Therapy Goals  Initiated 6/11/2024  1.  Patient will move from supine to sit and sit to supine, scoot up and down, and roll side to side in bed with independence within 7 day(s).    2.  Patient will perform sit to stand with modified independence within 7 day(s).  3.  Patient will transfer from bed to chair and chair to bed with modified independence using the least restrictive device within 7 day(s).  4.  Patient will ambulate with modified independence for 100 feet with the least restrictive device within 7 day(s).   5.  Patient will ascend/descend 4 stairs with  handrail(s) with supervision/set-up within 7 day(s).   Outcome: Progressing   PHYSICAL THERAPY EVALUATION    Patient: Ericka Patton (34 y.o. female)  Date: 6/11/2024  Primary Diagnosis: Tachycardia [R00.0]  Right hip pain [M25.551]  Hip pain, bilateral [M25.551, M25.552]  Fall from bed, initial encounter [W06.XXXA]  Acute pain of right knee [M25.561]       Precautions:                        ASSESSMENT :   DEFICITS/IMPAIRMENTS:   The patient is limited by decreased functional mobility, independence in ADLs, high-level IADLs, ROM, sensation, activity tolerance, endurance, safety awareness, attention/concentration, coordination, balance, increased pain levels     Based on the impairments listed above patient complaining having a lot of pain on her right LE from the fall at home few days ago. Patient was sitting on the edge of bed with nurse when received. Patient crying in pain while talking to the nurse, mom at bedside. Asked patient how we can  Cognitive Status: WNL        Hearing:        Vision/Perceptual:                  Strength:         Tone & Sensation:           Coordination:  Coordination: Generally decreased, functional    Range Of Motion:  AROM: Generally decreased, functional  PROM: Generally decreased, functional    Functional Mobility:  Bed Mobility:     Bed Mobility Training  Bed Mobility Training: Yes  Overall Level of Assistance: Minimum assistance;Additional time  Interventions: Safety awareness training;Verbal cues  Rolling: Minimum assistance;Additional time  Supine to Sit: Minimum assistance;Additional time  Sit to Supine: Minimum assistance;Additional time  Scooting: Minimum assistance;Additional time  Transfers:     Transfer Training  Transfer Training: Yes  Overall Level of Assistance: Minimum assistance;Additional time  Interventions: Safety awareness training;Verbal cues  Sit to Stand: Contact-guard assistance;Minimum assistance;Additional time  Stand to Sit: Minimum assistance;Additional time  Stand Pivot Transfers: Minimum assistance;Additional time  Bed to Chair: Minimum assistance;Additional time  Toilet Transfer: Minimum assistance  Balance:               Balance  Sitting: High guard  Sitting - Static: Good (unsupported)  Sitting - Dynamic: Good (unsupported);Fair (occasional)  Standing: Impaired  Standing - Static: Constant support;Fair  Standing - Dynamic: Constant support;Fair  Ambulation/Gait Training:                       Gait  Gait Training: Yes  Overall Level of Assistance: Minimum assistance;Additional time  Distance (ft): 10 Feet  Assistive Device: Gait belt;Walker, rolling  Interventions: Safety awareness training;Verbal cues  Base of Support: Widened  Speed/Griselda: Fluctuations;Slow  Step Length: Right shortened;Left shortened  Gait Abnormalities: Antalgic;Path deviations;Step to gait

## 2024-06-11 NOTE — PLAN OF CARE
Problem: Occupational Therapy - Adult  Goal: By Discharge: Performs self-care activities at highest level of function for planned discharge setting.  See evaluation for individualized goals.  Description: FUNCTIONAL STATUS PRIOR TO ADMISSION:  Patient requires assist for self care and functional transfers/mobility at baseline (use of rollator and electric WC as needed).     HOME SUPPORT: Patient lives with parents and siblings with family and boyfriend assisting as needed.    Occupational Therapy Goals:  Initiated 6/11/2024  1.  Patient will perform grooming with Stand by Assist within 7 day(s).  2.  Patient will perform upper body dressing with Stand by Assist within 7 day(s).  3.  Patient will perform lower body dressing with Minimal Assist within 7 day(s).  4.  Patient will perform toilet transfers with Stand by Assist  within 7 day(s).  5.  Patient will perform all aspects of toileting with Supervision within 7 day(s).  6.  Patient will utilize energy conservation techniques during functional activities with verbal cues within 7 day(s).    Outcome: Progressing     OCCUPATIONAL THERAPY EVALUATION    Patient: Ericka Patton (34 y.o. female)  Date: 6/11/2024  Primary Diagnosis: Tachycardia [R00.0]  Right hip pain [M25.551]  Hip pain, bilateral [M25.551, M25.552]  Fall from bed, initial encounter [W06.XXXA]  Acute pain of right knee [M25.561]         Precautions:                    ASSESSMENT :  Patient is 33 y/o female came [facility] s/p fall out of bed now with right hip pain radiating down through the right knee and placed under observation 6/10/2024 for sinus tachycardia, HTN, muscle pain/weakness, anvial hip pain (x-rays negative, awaiting MRI of right hip and lumbar spine however has been up to BSC and bathroom pending pain and cleared to work with therapy by nursing). Pt with hx of  generalized hypermobility spectrum disorder, fibromyalgia, psoriatic arthritis, chronic pain, cervical and lumbar surgery per

## 2024-06-11 NOTE — PALLIATIVE CARE
PALLIATIVE MEDICINE          Consult received and chart reviewed.     After discussion with the IDT and the attending, the consult was cancelled.    Our service is reserved for patients who have pain related to a terminal diagnosis such as cancer or for patients who are at end of life with a probable prognosis of 12 months.   We do not manage chronic pain.     Please contact the Palliative Team for any further questions [phone].    Thank you,      Mónica Garcia MSN, FNP-BC, ACHPN

## 2024-06-11 NOTE — CONSULTS
ORTHOPAEDIC CONSULT NOTE    Subjective:     Date of Consultation:  June 11, 2024      Ericka Patton is a 34 y.o. female with a complex PMH to include Iggy-Danlos, spinal stenosis, HTN, chronic back and R leg pain and muscle weakness with a past surgical history that includes a R IENZ by Dr. Abigail rodriguez in 2021, L knee ACL repair, ACDF and L umbar Spinal Fusion both with Neurosurgery who is being seen for R leg and knee pain. Pt reports increased pain and inability to wlak since a fall from her bed over the weekend, pt is able to ambulate to BR with walker and limited WB on the RLE, pain is current,ly not being managed well. Denies new or change in back pain. CT and XRs completed in ED were negative for acute fracture/ hardware disruption.  Per discussion with pt and her mother and review of the chart to include prior visits with [facility] and [facility] the pt has been seen by palliative care and hospice int he past and currently her PCP is writing her RXs until she is able to find a new practice/ provider to assist in her in managing her care. Pt reports she takes ER MS Contin with a combo of Oxy/hydromorphone for BTP. Of note the pt rep is present at the bedside as well. Mother and pt upset that they will not be seen by palliative care during this admission, I did explain to them the palliative care team here at  does not see pts without a terminal diagnosis and do not assist with pain management as stated in the notes.     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

## 2024-06-11 NOTE — PLAN OF CARE
Problem: Discharge Planning  Goal: Discharge to home or other facility with appropriate resources  6/11/2024 1029 by Albertina Glasgow RN  Outcome: Progressing  6/10/2024 2348 by Stephanie Balderrama RN  Outcome: Progressing     Problem: Pain  Goal: Verbalizes/displays adequate comfort level or baseline comfort level  6/11/2024 1029 by Albertina Glasgow RN  Outcome: Progressing  6/10/2024 2348 by Stephanie Balderrama RN  Outcome: Progressing     Problem: Safety - Adult  Goal: Free from fall injury  6/11/2024 1029 by Albertina Glasgow RN  Outcome: Progressing  6/10/2024 2348 by Stephanie Balderrama RN  Outcome: Progressing     Problem: ABCDS Injury Assessment  Goal: Absence of physical injury  6/11/2024 1029 by Albertina Glasgow RN  Outcome: Progressing  6/10/2024 2348 by Stephanie Balderrama RN  Outcome: Progressing

## 2024-06-12 ENCOUNTER — APPOINTMENT (OUTPATIENT)
Facility: HOSPITAL | Age: 34
DRG: 563 | End: 2024-06-12
Payer: MEDICARE

## 2024-06-12 PROBLEM — R00.0 SINUS TACHYCARDIA: Status: RESOLVED | Noted: 2018-01-17 | Resolved: 2024-06-12

## 2024-06-12 LAB
BACTERIA SPEC CULT: NORMAL
SERVICE CMNT-IMP: NORMAL

## 2024-06-12 PROCEDURE — 6360000002 HC RX W HCPCS

## 2024-06-12 PROCEDURE — 96376 TX/PRO/DX INJ SAME DRUG ADON: CPT

## 2024-06-12 PROCEDURE — 6370000000 HC RX 637 (ALT 250 FOR IP): Performed by: STUDENT IN AN ORGANIZED HEALTH CARE EDUCATION/TRAINING PROGRAM

## 2024-06-12 PROCEDURE — G0378 HOSPITAL OBSERVATION PER HR: HCPCS

## 2024-06-12 PROCEDURE — 6370000000 HC RX 637 (ALT 250 FOR IP): Performed by: INTERNAL MEDICINE

## 2024-06-12 PROCEDURE — 6360000002 HC RX W HCPCS: Performed by: INTERNAL MEDICINE

## 2024-06-12 PROCEDURE — 99231 SBSQ HOSP IP/OBS SF/LOW 25: CPT | Performed by: NURSE PRACTITIONER

## 2024-06-12 PROCEDURE — 6370000000 HC RX 637 (ALT 250 FOR IP): Performed by: FAMILY MEDICINE

## 2024-06-12 PROCEDURE — 94761 N-INVAS EAR/PLS OXIMETRY MLT: CPT

## 2024-06-12 PROCEDURE — 73721 MRI JNT OF LWR EXTRE W/O DYE: CPT

## 2024-06-12 RX ORDER — METHOCARBAMOL 500 MG/1
750 TABLET, FILM COATED ORAL 4 TIMES DAILY
Status: DISCONTINUED | OUTPATIENT
Start: 2024-06-12 | End: 2024-06-13 | Stop reason: HOSPADM

## 2024-06-12 RX ORDER — CYCLOBENZAPRINE HCL 10 MG
10 TABLET ORAL 3 TIMES DAILY
Status: DISCONTINUED | OUTPATIENT
Start: 2024-06-12 | End: 2024-06-13 | Stop reason: HOSPADM

## 2024-06-12 RX ORDER — CELECOXIB 100 MG/1
200 CAPSULE ORAL DAILY
Status: DISCONTINUED | OUTPATIENT
Start: 2024-06-12 | End: 2024-06-13 | Stop reason: HOSPADM

## 2024-06-12 RX ORDER — ALPRAZOLAM 0.25 MG/1
0.25 TABLET ORAL EVERY 12 HOURS SCHEDULED
Status: DISCONTINUED | OUTPATIENT
Start: 2024-06-12 | End: 2024-06-12

## 2024-06-12 RX ORDER — LORAZEPAM 1 MG/1
2 TABLET ORAL ONCE
Status: COMPLETED | OUTPATIENT
Start: 2024-06-12 | End: 2024-06-12

## 2024-06-12 RX ORDER — ALPRAZOLAM 0.5 MG/1
1 TABLET ORAL 4 TIMES DAILY
Status: DISCONTINUED | OUTPATIENT
Start: 2024-06-12 | End: 2024-06-13 | Stop reason: HOSPADM

## 2024-06-12 RX ORDER — METHOCARBAMOL 500 MG/1
750 TABLET, FILM COATED ORAL 4 TIMES DAILY
Status: DISCONTINUED | OUTPATIENT
Start: 2024-06-12 | End: 2024-06-12

## 2024-06-12 RX ORDER — ALPRAZOLAM 0.5 MG/1
1 TABLET ORAL 3 TIMES DAILY
Status: DISCONTINUED | OUTPATIENT
Start: 2024-06-12 | End: 2024-06-12

## 2024-06-12 RX ORDER — ACETAMINOPHEN 325 MG/1
650 TABLET ORAL EVERY 8 HOURS
Status: DISCONTINUED | OUTPATIENT
Start: 2024-06-12 | End: 2024-06-13 | Stop reason: HOSPADM

## 2024-06-12 RX ADMIN — FAMOTIDINE 20 MG: 20 TABLET, FILM COATED ORAL at 08:45

## 2024-06-12 RX ADMIN — MORPHINE SULFATE 60 MG: 30 TABLET, FILM COATED, EXTENDED RELEASE ORAL at 21:17

## 2024-06-12 RX ADMIN — OXYCODONE HYDROCHLORIDE 10 MG: 5 TABLET ORAL at 05:13

## 2024-06-12 RX ADMIN — ALPRAZOLAM 1 MG: 0.5 TABLET ORAL at 16:35

## 2024-06-12 RX ADMIN — ALPRAZOLAM 1 MG: 0.5 TABLET ORAL at 21:17

## 2024-06-12 RX ADMIN — POLYETHYLENE GLYCOL 3350 17 G: 17 POWDER, FOR SOLUTION ORAL at 23:13

## 2024-06-12 RX ADMIN — GABAPENTIN 1200 MG: 300 CAPSULE ORAL at 12:07

## 2024-06-12 RX ADMIN — CELECOXIB 200 MG: 100 CAPSULE ORAL at 14:32

## 2024-06-12 RX ADMIN — ALPRAZOLAM 1 MG: 0.5 TABLET ORAL at 08:44

## 2024-06-12 RX ADMIN — METOPROLOL TARTRATE 25 MG: 25 TABLET, FILM COATED ORAL at 21:17

## 2024-06-12 RX ADMIN — PROCHLORPERAZINE EDISYLATE 10 MG: 5 INJECTION INTRAMUSCULAR; INTRAVENOUS at 05:14

## 2024-06-12 RX ADMIN — ALPRAZOLAM 1 MG: 0.5 TABLET ORAL at 12:35

## 2024-06-12 RX ADMIN — PROCHLORPERAZINE EDISYLATE 10 MG: 5 INJECTION INTRAMUSCULAR; INTRAVENOUS at 12:10

## 2024-06-12 RX ADMIN — LORAZEPAM 2 MG: 1 TABLET ORAL at 19:04

## 2024-06-12 RX ADMIN — HYDROMORPHONE HYDROCHLORIDE 8 MG: 2 TABLET ORAL at 18:00

## 2024-06-12 RX ADMIN — PROMETHAZINE HYDROCHLORIDE 25 MG: 25 TABLET ORAL at 08:51

## 2024-06-12 RX ADMIN — HYDROMORPHONE HYDROCHLORIDE 8 MG: 2 TABLET ORAL at 07:02

## 2024-06-12 RX ADMIN — METOPROLOL TARTRATE 25 MG: 25 TABLET, FILM COATED ORAL at 08:45

## 2024-06-12 RX ADMIN — ONDANSETRON 4 MG: 2 INJECTION INTRAMUSCULAR; INTRAVENOUS at 02:14

## 2024-06-12 RX ADMIN — VENLAFAXINE HYDROCHLORIDE 37.5 MG: 37.5 CAPSULE, EXTENDED RELEASE ORAL at 08:45

## 2024-06-12 RX ADMIN — HYDROMORPHONE HYDROCHLORIDE 8 MG: 2 TABLET ORAL at 02:13

## 2024-06-12 RX ADMIN — OXYCODONE HYDROCHLORIDE 10 MG: 5 TABLET ORAL at 08:52

## 2024-06-12 RX ADMIN — ONDANSETRON 4 MG: 4 TABLET, ORALLY DISINTEGRATING ORAL at 16:24

## 2024-06-12 RX ADMIN — OXYCODONE HYDROCHLORIDE 10 MG: 5 TABLET ORAL at 23:14

## 2024-06-12 RX ADMIN — DEXTROAMPHETAMINE SACCHARATE, AMPHETAMINE ASPARTATE MONOHYDRATE, DEXTROAMPHETAMINE SULFATE AND AMPHETAMINE SULFATE 10 MG: 2.5; 2.5; 2.5; 2.5 CAPSULE, EXTENDED RELEASE ORAL at 08:45

## 2024-06-12 RX ADMIN — CLONIDINE HYDROCHLORIDE 0.1 MG: 0.1 TABLET ORAL at 12:08

## 2024-06-12 RX ADMIN — DICLOFENAC SODIUM 2 G: 10 GEL TOPICAL at 05:14

## 2024-06-12 RX ADMIN — DRONABINOL 5 MG: 2.5 CAPSULE ORAL at 08:45

## 2024-06-12 RX ADMIN — CYCLOBENZAPRINE 10 MG: 10 TABLET, FILM COATED ORAL at 14:32

## 2024-06-12 RX ADMIN — CLONIDINE HYDROCHLORIDE 0.1 MG: 0.1 TABLET ORAL at 08:45

## 2024-06-12 RX ADMIN — OXYCODONE HYDROCHLORIDE 10 MG: 5 TABLET ORAL at 00:12

## 2024-06-12 RX ADMIN — OXYCODONE HYDROCHLORIDE 10 MG: 5 TABLET ORAL at 16:24

## 2024-06-12 RX ADMIN — METHOCARBAMOL TABLETS 750 MG: 500 TABLET, COATED ORAL at 18:34

## 2024-06-12 RX ADMIN — GABAPENTIN 1200 MG: 300 CAPSULE ORAL at 21:17

## 2024-06-12 RX ADMIN — PROMETHAZINE HYDROCHLORIDE 25 MG: 25 TABLET ORAL at 14:32

## 2024-06-12 RX ADMIN — CLONIDINE HYDROCHLORIDE 0.1 MG: 0.1 TABLET ORAL at 16:24

## 2024-06-12 RX ADMIN — ONDANSETRON 4 MG: 2 INJECTION INTRAMUSCULAR; INTRAVENOUS at 07:49

## 2024-06-12 RX ADMIN — ACETAMINOPHEN 650 MG: 325 TABLET ORAL at 16:25

## 2024-06-12 RX ADMIN — FAMOTIDINE 20 MG: 20 TABLET, FILM COATED ORAL at 21:17

## 2024-06-12 RX ADMIN — MORPHINE SULFATE 60 MG: 30 TABLET, FILM COATED, EXTENDED RELEASE ORAL at 08:45

## 2024-06-12 RX ADMIN — CYCLOBENZAPRINE 10 MG: 10 TABLET, FILM COATED ORAL at 08:45

## 2024-06-12 RX ADMIN — HYDROMORPHONE HYDROCHLORIDE 8 MG: 2 TABLET ORAL at 14:37

## 2024-06-12 RX ADMIN — OXYCODONE HYDROCHLORIDE 10 MG: 5 TABLET ORAL at 12:36

## 2024-06-12 RX ADMIN — HYDROMORPHONE HYDROCHLORIDE 8 MG: 2 TABLET ORAL at 10:31

## 2024-06-12 RX ADMIN — CLONIDINE HYDROCHLORIDE 0.1 MG: 0.1 TABLET ORAL at 21:17

## 2024-06-12 RX ADMIN — DRONABINOL 5 MG: 2.5 CAPSULE ORAL at 21:17

## 2024-06-12 RX ADMIN — ACETAMINOPHEN 650 MG: 325 TABLET ORAL at 08:45

## 2024-06-12 RX ADMIN — GABAPENTIN 1200 MG: 300 CAPSULE ORAL at 08:46

## 2024-06-12 ASSESSMENT — PAIN DESCRIPTION - LOCATION
LOCATION: BACK;LEG
LOCATION: BACK;LEG
LOCATION: LEG
LOCATION: LEG;HIP
LOCATION: LEG

## 2024-06-12 ASSESSMENT — PAIN SCALES - GENERAL
PAINLEVEL_OUTOF10: 3
PAINLEVEL_OUTOF10: 3
PAINLEVEL_OUTOF10: 6
PAINLEVEL_OUTOF10: 3
PAINLEVEL_OUTOF10: 9
PAINLEVEL_OUTOF10: 6
PAINLEVEL_OUTOF10: 6
PAINLEVEL_OUTOF10: 8
PAINLEVEL_OUTOF10: 8
PAINLEVEL_OUTOF10: 6
PAINLEVEL_OUTOF10: 8
PAINLEVEL_OUTOF10: 6
PAINLEVEL_OUTOF10: 3

## 2024-06-12 ASSESSMENT — PAIN DESCRIPTION - DESCRIPTORS
DESCRIPTORS: SHARP
DESCRIPTORS: SHARP
DESCRIPTORS: SHARP;ACHING
DESCRIPTORS: SHARP
DESCRIPTORS: SHARP
DESCRIPTORS: ACHING;SHARP
DESCRIPTORS: SHARP

## 2024-06-12 ASSESSMENT — PAIN DESCRIPTION - ORIENTATION
ORIENTATION: LOWER;RIGHT
ORIENTATION: RIGHT;LEFT
ORIENTATION: LEFT;RIGHT
ORIENTATION: RIGHT;LEFT
ORIENTATION: RIGHT;LEFT
ORIENTATION: RIGHT
ORIENTATION: RIGHT

## 2024-06-12 ASSESSMENT — PAIN DESCRIPTION - FREQUENCY
FREQUENCY: CONTINUOUS
FREQUENCY: CONTINUOUS

## 2024-06-12 ASSESSMENT — PAIN DESCRIPTION - ONSET
ONSET: ON-GOING
ONSET: ON-GOING

## 2024-06-12 NOTE — PLAN OF CARE
Problem: Discharge Planning  Goal: Discharge to home or other facility with appropriate resources  6/11/2024 2039 by Woo Faust RN  Outcome: Progressing  6/11/2024 1029 by Albertina Glasgow RN  Outcome: Progressing     Problem: Pain  Goal: Verbalizes/displays adequate comfort level or baseline comfort level  6/11/2024 2039 by Woo Faust RN  Outcome: Progressing  6/11/2024 1029 by Albertina Glasgow RN  Outcome: Progressing     Problem: Safety - Adult  Goal: Free from fall injury  6/11/2024 2039 by Woo Faust RN  Outcome: Progressing  6/11/2024 1029 by Albertina Glasgow RN  Outcome: Progressing     Problem: ABCDS Injury Assessment  Goal: Absence of physical injury  6/11/2024 1029 by Albertina Glasgow RN  Outcome: Progressing     Problem: Occupational Therapy - Adult  Goal: By Discharge: Performs self-care activities at highest level of function for planned discharge setting.  See evaluation for individualized goals.  Description: FUNCTIONAL STATUS PRIOR TO ADMISSION:  Patient requires assist for self care and functional transfers/mobility at baseline (use of rollator and electric WC as needed).     HOME SUPPORT: Patient lives with parents and siblings with family and boyfriend assisting as needed.    Occupational Therapy Goals:  Initiated 6/11/2024  1.  Patient will perform grooming with Stand by Assist within 7 day(s).  2.  Patient will perform upper body dressing with Stand by Assist within 7 day(s).  3.  Patient will perform lower body dressing with Minimal Assist within 7 day(s).  4.  Patient will perform toilet transfers with Stand by Assist  within 7 day(s).  5.  Patient will perform all aspects of toileting with Supervision within 7 day(s).  6.  Patient will utilize energy conservation techniques during functional activities with verbal cues within 7 day(s).    6/11/2024 1147 by Radha Valdovinos, DORIS  Outcome: Progressing     Problem: Physical Therapy - Adult  Goal: By

## 2024-06-12 NOTE — DISCHARGE INSTRUCTIONS
HOSPITALIST DISCHARGE INSTRUCTIONS  NAME:  Ericka Patton   :  1990   MRN:  [id number]     Date/Time:  2024 3:33 PM    ADMIT DATE: 6/10/2024     DISCHARGE DATE: 2024     DISCHARGE DIAGNOSIS:  Right hip pain    DISCHARGE INSTRUCTIONS:  Thank you for allowing us to participate in your care. Your discharging Hospitalist is Darnell Roblero MD. You were admitted for evaluation and treatment of the above.     Please follow-up with your neurosurgeon for L3-L4 findings, schedule appointment as soon as hospital discharge      MEDICATIONS:    It is important that you take the medication exactly as they are prescribed.   Keep your medication in the bottles provided by the pharmacist and keep a list of the medication names, dosages, and times to be taken in your wallet.   Do not take other medications without consulting your doctor.             If you experience any of the following symptoms then please call your primary care physician or return to the emergency room if you cannot get hold of your doctor:  Fever, chills, nausea, vomiting, diarrhea, change in mentation, falling, bleeding, shortness of breath    Follow Up:  Please call the below provider to arrange hospital follow up appointment      [place]  [address] [address]  [address]  [place] [facility] [address] [phone]    Follow up in 2 week(s)      Marcos Hayes MD  [address]  [facility] [address]  [phone]    Follow up in 2 week(s)  if able to see with insurance    Ag Mcqueen MD  [address] [address]  [facility] [address] [phone]    Follow up in 2 week(s)        For questions regarding your Hospitalization or to contact the Hospital Medicine team, please call [phone].      Information obtained by :  I understand that if any problems occur once I am at home I am to contact my physician.    I understand and acknowledge receipt of the instructions indicated above.

## 2024-06-12 NOTE — CARE COORDINATION
12:30 PM  06/12/24    Care Management Progress Note    Reason for Admission:   Tachycardia [R00.0]  Right hip pain [M25.551]  Hip pain, bilateral [M25.551, M25.552]  Fall from bed, initial encounter [W06.XXXA]  Acute pain of right knee [M25.561]         Patient Admission Status: Observation  RUR: No data recorded  Hospitalization in the last 30 days (Readmission):  No        Transition of care plan:  Ongoing medical management- pt discussed during IDR. Waiting on MRI to be done; pt continues to report pain.  Anticipated discharge is home with family. CM met with pt and mother in room- pt was lying in bed and reports continued pain, stated that \"2 out of 3 MRIs have been completed\".   Outpatient follow-up.  Pt's family to transport.        CM will continue to follow and assist with discharge needs.    SRINIVAS Cano

## 2024-06-12 NOTE — DISCHARGE SUMMARY
Hospitalist Discharge Summary     Patient ID:  Ericka Patton  [id number]  34 y.o.  1990    Admit date: 6/10/2024    Discharge date and time: 6/12/2024    Admission Diagnoses: Tachycardia [R00.0]  Right hip pain [M25.551]  Hip pain, bilateral [M25.551, M25.552]  Fall from bed, initial encounter [W06.XXXA]  Acute pain of right knee [M25.561]    Discharge Diagnoses:    Principal Problem:    Hip pain, bilateral  Active Problems:    Fibromyalgia    DDD (degenerative disc disease), lumbosacral    Chronic pain    Muscle pain    Chronic back pain greater than 3 months duration    ADHD (attention deficit hyperactivity disorder)    HTN (hypertension)    Anxiety and depression    Muscle weakness    Mitochondrial disease (HCC)    Iggy-Danlos syndrome    Arthritis    Morbid obesity (HCC)    Polypharmacy  Resolved Problems:    Sinus tachycardia         Hospital Course:   35 yo female with an extensive chronic pain history, generalized hypermobility spectrum disorder, opioid dependence is admitted with intractable hip pain after having a fall and difficulty with ambulation      #Intractible right hip pain, acute on chronic  -worse after she fell, radiates to her knee  -she has hx of avascular necrosis of right hip s/p hip replacement  -ortho consulted   -Imaging and plan as the following  -CT Lumbar spine: L1-L2: There is no spinal canal or neural foraminal stenosis.  L2-L3: There is no spinal canal or neural foraminal stenosis.  L3-L4: Disc height loss with vacuum phenomena and a slight bulge. This mildly  narrows the canal, no change  L4-L5: Disc height loss with a small bulge mildly narrowing the canal and  moderately narrows the foramen. No change,  L5-S1: This level is been decompressed  Postoperative changes L5-S1 with degenerative changes of the adjacent levels  most significant at L4-5 better evaluated on previous MRI. No fracture  MRI lumbar spine  T12-L1: No stenosis. Mild facet osteoarthritis.  L1-L2: No

## 2024-06-13 VITALS
OXYGEN SATURATION: 93 % | TEMPERATURE: 98.1 F | HEART RATE: 97 BPM | WEIGHT: 240 LBS | BODY MASS INDEX: 41.2 KG/M2 | DIASTOLIC BLOOD PRESSURE: 79 MMHG | RESPIRATION RATE: 16 BRPM | SYSTOLIC BLOOD PRESSURE: 107 MMHG

## 2024-06-13 PROBLEM — M54.50 BACK PAIN AT L4-L5 LEVEL: Status: ACTIVE | Noted: 2024-06-13

## 2024-06-13 PROCEDURE — 94761 N-INVAS EAR/PLS OXIMETRY MLT: CPT

## 2024-06-13 PROCEDURE — 96372 THER/PROPH/DIAG INJ SC/IM: CPT

## 2024-06-13 PROCEDURE — 6360000002 HC RX W HCPCS: Performed by: INTERNAL MEDICINE

## 2024-06-13 PROCEDURE — 6370000000 HC RX 637 (ALT 250 FOR IP): Performed by: FAMILY MEDICINE

## 2024-06-13 PROCEDURE — 6370000000 HC RX 637 (ALT 250 FOR IP): Performed by: INTERNAL MEDICINE

## 2024-06-13 PROCEDURE — 6370000000 HC RX 637 (ALT 250 FOR IP): Performed by: STUDENT IN AN ORGANIZED HEALTH CARE EDUCATION/TRAINING PROGRAM

## 2024-06-13 PROCEDURE — G0378 HOSPITAL OBSERVATION PER HR: HCPCS

## 2024-06-13 PROCEDURE — 1100000000 HC RM PRIVATE

## 2024-06-13 RX ORDER — LANOLIN ALCOHOL/MO/W.PET/CERES
3 CREAM (GRAM) TOPICAL NIGHTLY PRN
Qty: 15 TABLET | Refills: 0 | Status: SHIPPED | OUTPATIENT
Start: 2024-06-13 | End: 2024-06-28

## 2024-06-13 RX ORDER — LANOLIN ALCOHOL/MO/W.PET/CERES
3 CREAM (GRAM) TOPICAL ONCE
Status: DISCONTINUED | OUTPATIENT
Start: 2024-06-13 | End: 2024-06-13 | Stop reason: HOSPADM

## 2024-06-13 RX ADMIN — METHOCARBAMOL TABLETS 750 MG: 500 TABLET, COATED ORAL at 08:25

## 2024-06-13 RX ADMIN — HYDROMORPHONE HYDROCHLORIDE 8 MG: 2 TABLET ORAL at 07:37

## 2024-06-13 RX ADMIN — ALPRAZOLAM 1 MG: 0.5 TABLET ORAL at 08:26

## 2024-06-13 RX ADMIN — HYDROMORPHONE HYDROCHLORIDE 8 MG: 2 TABLET ORAL at 00:34

## 2024-06-13 RX ADMIN — ENOXAPARIN SODIUM 30 MG: 100 INJECTION SUBCUTANEOUS at 08:19

## 2024-06-13 RX ADMIN — OXYCODONE HYDROCHLORIDE 10 MG: 5 TABLET ORAL at 03:10

## 2024-06-13 RX ADMIN — ACETAMINOPHEN 650 MG: 325 TABLET ORAL at 00:33

## 2024-06-13 RX ADMIN — FAMOTIDINE 20 MG: 20 TABLET, FILM COATED ORAL at 08:22

## 2024-06-13 RX ADMIN — VENLAFAXINE HYDROCHLORIDE 37.5 MG: 37.5 CAPSULE, EXTENDED RELEASE ORAL at 08:21

## 2024-06-13 RX ADMIN — HYDROMORPHONE HYDROCHLORIDE 8 MG: 2 TABLET ORAL at 11:58

## 2024-06-13 RX ADMIN — PROMETHAZINE HYDROCHLORIDE 25 MG: 25 TABLET ORAL at 00:33

## 2024-06-13 RX ADMIN — OXYCODONE HYDROCHLORIDE 10 MG: 5 TABLET ORAL at 08:43

## 2024-06-13 RX ADMIN — METOPROLOL TARTRATE 25 MG: 25 TABLET, FILM COATED ORAL at 08:22

## 2024-06-13 RX ADMIN — CELECOXIB 200 MG: 100 CAPSULE ORAL at 08:21

## 2024-06-13 RX ADMIN — DRONABINOL 5 MG: 2.5 CAPSULE ORAL at 08:20

## 2024-06-13 RX ADMIN — ACETAMINOPHEN 650 MG: 325 TABLET ORAL at 08:21

## 2024-06-13 RX ADMIN — DEXTROAMPHETAMINE SACCHARATE, AMPHETAMINE ASPARTATE MONOHYDRATE, DEXTROAMPHETAMINE SULFATE AND AMPHETAMINE SULFATE 10 MG: 2.5; 2.5; 2.5; 2.5 CAPSULE, EXTENDED RELEASE ORAL at 08:21

## 2024-06-13 RX ADMIN — CLONIDINE HYDROCHLORIDE 0.1 MG: 0.1 TABLET ORAL at 08:20

## 2024-06-13 RX ADMIN — GABAPENTIN 1200 MG: 300 CAPSULE ORAL at 08:21

## 2024-06-13 RX ADMIN — PROMETHAZINE HYDROCHLORIDE 25 MG: 25 TABLET ORAL at 09:52

## 2024-06-13 RX ADMIN — MORPHINE SULFATE 60 MG: 30 TABLET, FILM COATED, EXTENDED RELEASE ORAL at 08:22

## 2024-06-13 ASSESSMENT — PAIN DESCRIPTION - ORIENTATION
ORIENTATION: LEFT;RIGHT
ORIENTATION: RIGHT;LEFT
ORIENTATION: RIGHT;INNER;LOWER
ORIENTATION: MID

## 2024-06-13 ASSESSMENT — PAIN - FUNCTIONAL ASSESSMENT
PAIN_FUNCTIONAL_ASSESSMENT: PREVENTS OR INTERFERES WITH MANY ACTIVE NOT PASSIVE ACTIVITIES
PAIN_FUNCTIONAL_ASSESSMENT: ACTIVITIES ARE NOT PREVENTED
PAIN_FUNCTIONAL_ASSESSMENT: ACTIVITIES ARE NOT PREVENTED

## 2024-06-13 ASSESSMENT — PAIN DESCRIPTION - LOCATION
LOCATION: ABDOMEN
LOCATION: BACK
LOCATION: BACK;HIP;LEG
LOCATION: HIP;LEG
LOCATION: HIP;LEG

## 2024-06-13 ASSESSMENT — PAIN SCALES - GENERAL
PAINLEVEL_OUTOF10: 0
PAINLEVEL_OUTOF10: 9
PAINLEVEL_OUTOF10: 3
PAINLEVEL_OUTOF10: 8
PAINLEVEL_OUTOF10: 10
PAINLEVEL_OUTOF10: 6

## 2024-06-13 ASSESSMENT — PAIN DESCRIPTION - DESCRIPTORS
DESCRIPTORS: SHARP
DESCRIPTORS: SORE;ACHING;DISCOMFORT
DESCRIPTORS: SHARP
DESCRIPTORS: ACHING

## 2024-06-13 NOTE — PROGRESS NOTES
Hospitalist Progress Note      NAME:  Ericka Patton   :  1990  MRM:  [id number]    Date/Time: 2024  11:46 AM           Assessment / Plan:   33 yo female with an extensive chronic pain history, generalized hypermobility spectrum disorder, opioid dependence is admitted with intractable hip pain after having a fall and difficulty with ambulation     #Intractible right hip pain, acute on chronic  -worse after she fell, radiates to her knee  -she has hx of avascular necrosis of right hip s/p hip replacement  -MRI to further evaluate hardware, patient could not tolerate hip MRI, will discuss with orthopedics if we need to try again, MRI knee still pending  -ortho consulted   -PT/OT  -review of her  shows multiple different narcotics at high doses, benzodiazepines, stimulants, and THC which is highly inappropriate.  Review of records show is unwilling to transition to methadone or suboxone for pain control and she was advised to follow up with pain management which she has failed to do.   -patient also requested palliative care consult which was placed, however she does not have a terminal diagnosis and is not at end of life so palliative care will not see her. She was informed of this during a telephone encounter with them back in January.   -she was previously enrolled with hospice [facility] but then discharged due to not meeting requirements to stay enrolled  -per [facility] records \"Ericka’s focus remains on a palliative based treatment plan where aggressive measures like orthopedic surgery would not be on the table.  They would like to focus on her quality of life. \"     Anxiety/depression  ADHD  Fibromyalgia  -can resume home meds but she needs to be weaned off of all her benzodiazepines since she is on high doses of narcotics, muscle relaxers and also using THC. This can be done through her pcp or psychiatrist as outpatient   -per [facility] records \"psychiatry was consulted. They attempted to see the 
0857am nurse made Dr. Boewr aware that patient reports she needs another muscle relaxer. Patient reports she takes oxycodone 10mg at home and needs something prn to be able to do MRI.    1052am nurse made Dr. Bower aware that patient reports she needs oxycodone ordered.    1100am per Dr. Bower she is holding off on prednisone until we confirm her hip is intact.     1349 nurse made Dr. Bower aware that patient is requesting a banana bag.  1350 per Dr. Bower her electrolytes are fine and nurse to continue to monitor intake.  
2214  Reached out to NP regarding pt still having nausea even after receiving Phenergan. NP ordered compazine IV PRN.     0424  Reached out to NP regarding pt having 9/10 on right leg and knee. Dilaudid was given at 0209.     0431  NP ordered Voltaren gel and to give Flexeril.   
Attempted to work with the patient for Physical/Occupational Therapy, chart reviewed and discussed with nurse patient being medicated for pain so she can have MRI. We will continue to follow up with the patient for therapy.      
Ortho Progress Note:    MRI of R hip completed and results reviewed, no acute fracture or tendon tear noted.  Advised to continue ice, ROM as tolerated, WBAT with walker, and pain management as per current regimen with her outpt MD. Recommend pt to follow up with surgeon as outpt.   Ortho will sign off.  
Orthopedic NP Progress Note      June 12, 2024 2:23 PM     Ericka Patton    Attending Physician: Treatment Team: Attending Provider: Darnell Roblero MD; Consulting Physician: Davis Ventura MD; Utilization Reviewer: Karla Irving, TOMAS; Consulting Physician: Elvira Hollis APRN - NP; : Allyssa Pacheco; Registered Nurse: Marla Godinez, RN; Patient Care Tech: Marion Wallace; Registered Nurse: Lisa Bettencourt RN; Occupational Therapist: Laina Duque OT; Physical Therapist: Jamal Weber, PT     Vital Signs:    Patient Vitals for the past 8 hrs:   BP Temp Temp src Pulse Resp SpO2   06/12/24 1119 112/70 98.2 °F (36.8 °C) Oral 78 18 97 %   06/12/24 0732 100/71 97.7 °F (36.5 °C) Oral 98 18 98 %   06/12/24 0702 -- -- -- -- 18 --          Intake/Output:  No intake/output data recorded.  06/10 1901 - 06/12 0700  In: -   Out: 900 [Urine:900]    Pain Control:        LAB:    Recent Labs     06/11/24  0237   HCT 35.5   HGB 11.7     Lab Results   Component Value Date/Time     06/11/2024 02:37 AM    K 3.8 06/11/2024 02:37 AM     06/11/2024 02:37 AM    CO2 32 06/11/2024 02:37 AM    BUN 13 06/11/2024 02:37 AM       Subjective:  Ericka Patton is a 34 y.o. female  with acute and chronic pain in the R hip and knee with recent fall. Pt was able to complete the MRI of L spine and R knee, unfortunately unable to complete MRI of R hip    . Tolerating diet.       Objective: General: alert, cooperative, no distress.    Neuro/Vascular: CNS Intact.  Sensation stable. Brisk cap refill, 2+ pulses UE/LE  Musculoskeletal:  Pt up with walker, pt was able to get out bed independently, pt has been up with BR with walker independently      Skin: warm and dry                PT/OT:   Gait:                      Assessment:    s/p     Principal Problem:    Hip pain, bilateral  Active Problems:    Fibromyalgia    DDD (degenerative disc disease), lumbosacral    Chronic pain    Muscle pain    Chronic back pain 
Decreased aggressive behavior   [  ] Decreased feelings of stress  [  ] Discussed healthy coping skills     [x] Improved mood    [  ] Decreased withdrawn behavior     Social:  [  ] Decreased feelings of isolation/loneliness [x] Positive social interaction   [x] Provided support and/or comfort for family/friends    Spiritual:  [x] Spiritual support    [x] Expressed peace  [x] Expressed jaylene    [x] Discussed beliefs    Techniques Utilized (Check all that apply):   [  ] Procedural support MT [x] Music for relaxation [x] Patient preferred music  [  ] Janey analysis  [  ] Song choice  [x] Music for validation  [  ] Entrainment  [  ] Movement to music [x] Guided visualization  [  ] ISO Principle  [  ] Patient instrument playing [  ] Song writing  [  ] Sing along   [x] Improvisation  [  ] Sensory stimulation  [x] Active Listening  [x] Music for spiritual support [  ] Making of CDs as gifts    Session Observations:  Referred by Oneyda Ji LCSW; Patient (pt) was alert, sitting on the side of her bed upon this music therapists (MT) arrival. Pt's mother was also present and providing care as this MT introduced self/role and asked how she was feeling. Pt appeared tearful and presented with facial/muscle tension as she engaged with this MT. She acknowledged how upsetting her morning had been and expressed an openness to a visit for the purposes of helping her \"wind down\". Due to the pt's need to use the restroom, MT offered support to the pt's mother, as well as helped with a variety of tasks (bringing a cup of ice, filling the pt's cup, etc.). While assisting with these tasks, a member from Patient Advocacy consulted with this MT regarding a visit with the pt. MT and the Patient Advocate then re-entered the space together to allow an opportunity for a brief check-in with the pt. Upon the Patient Advocate's exit, MT sat at bedside and transitioned into the music therapy session.     Interventions: Singing; Live 
prior progress note. Most recent are:  Vitals:    06/11/24 1050   BP: (!) 116/95   Pulse: (!) 105   Resp: 18   Temp: 97.9 °F (36.6 °C)   SpO2: 99%         Intake/Output Summary (Last 24 hours) at 6/11/2024 1219  Last data filed at 6/11/2024 0700  Gross per 24 hour   Intake --   Output 900 ml   Net -900 ml        Physical Examination:             Constitutional:  Pt moaning and reporting pain, is able to move but reports pain with movement    ENT:  Oral mucosa moist, oropharynx benign.    Resp:  CTA bilaterally. No wheezing/rhonchi/rales. No accessory muscle use   CV:  Regular rhythm, normal rate, no murmurs, gallops, rubs    GI:  Soft, non distended, non tender. normoactive bowel sounds, no hepatosplenomegaly     Musculoskeletal:  No edema, warm, 2+ pulses throughout    Neurologic:  Moves all extremities.  AAOx3, CN II-XII reviewed     Psych:  Good insight, appears anxious       Data Review:    Review and/or order of clinical lab test      Labs:     Recent Labs     06/10/24  1650 06/11/24  0237   WBC 17.9* 10.0   HGB 13.0 11.7   HCT 39.5 35.5    265     Recent Labs     06/10/24  1650 06/11/24  0237    139   K 3.8 3.8    103   CO2 29 32   BUN 16 13   MG  --  2.4   PHOS  --  3.9     Lab Results   Component Value Date/Time     06/11/2024 02:37 AM     06/10/2024 04:50 PM    ALT 77 01/11/2024 09:17 PM    GLOB 2.9 06/11/2024 02:37 AM    GLOB 3.4 06/10/2024 04:50 PM    GLOB 3.7 01/11/2024 09:17 PM     Lab Results   Component Value Date/Time    INR 1.0 01/11/2024 09:17 PM    INR 1.0 12/10/2021 03:36 PM      No results found for: \"IRON\", \"TIBC\"   No results found for: \"RBCF\"   No results for input(s): \"PH\", \"PCO2\", \"PO2\" in the last 72 hours.  No results found for: \"CPK\"  No results found for: \"CHOL\", \"CHLST\", \"CHOLV\", \"HDL\", \"HDLC\", \"LDL\"  No results found for: \"GLUCPOC\"  No results found for: \"UA\"      Medications Reviewed:     Current Facility-Administered Medications   Medication Dose

## 2024-06-13 NOTE — PLAN OF CARE
Problem: Discharge Planning  Goal: Discharge to home or other facility with appropriate resources  6/13/2024 1352 by Dawson Walsh RN  Outcome: Adequate for Discharge  6/13/2024 0329 by Woo Faust RN  Outcome: Progressing     Problem: Pain  Goal: Verbalizes/displays adequate comfort level or baseline comfort level  6/13/2024 1352 by Dawson Walsh RN  Outcome: Adequate for Discharge  6/13/2024 0329 by Woo Faust RN  Outcome: Progressing     Problem: Safety - Adult  Goal: Free from fall injury  6/13/2024 1352 by Dawson Walsh RN  Outcome: Adequate for Discharge  6/13/2024 0329 by Woo Faust RN  Outcome: Progressing     Problem: ABCDS Injury Assessment  Goal: Absence of physical injury  6/13/2024 1352 by Dawson Walsh RN  Outcome: Adequate for Discharge  6/13/2024 0329 by Woo Faust RN  Outcome: Progressing

## 2024-06-13 NOTE — DISCHARGE SUMMARY
Hospitalist Discharge Summary     Patient ID:  Ericka Patton  [id number]  34 y.o.  1990    Admit date: 6/10/2024    Discharge date and time: 6/13/2024    Admission Diagnoses: Tachycardia [R00.0]  Right hip pain [M25.551]  Hip pain, bilateral [M25.551, M25.552]  Fall from bed, initial encounter [W06.XXXA]  Acute pain of right knee [M25.561]  Back pain at L4-L5 level [M54.50]    Discharge Diagnoses:    Principal Problem:    Hip pain, bilateral  Active Problems:    Fibromyalgia    DDD (degenerative disc disease), lumbosacral    Chronic pain    Muscle pain    Chronic back pain greater than 3 months duration    ADHD (attention deficit hyperactivity disorder)    HTN (hypertension)    Anxiety and depression    Muscle weakness    Mitochondrial disease (HCC)    Iggy-Danlos syndrome    Arthritis    Morbid obesity (HCC)    Polypharmacy    Back pain at L4-L5 level  Resolved Problems:    Sinus tachycardia         Hospital Course:   33 yo female with an extensive chronic pain history, generalized hypermobility spectrum disorder, opioid dependence is admitted with intractable hip pain after having a fall and difficulty with ambulation      #Intractible right hip pain, acute on chronic  -worse after she fell, radiates to her knee  -she has hx of avascular necrosis of right hip s/p hip replacement  -ortho consulted   -Imaging and plan as the following  -CT Lumbar spine: L1-L2: There is no spinal canal or neural foraminal stenosis.  L2-L3: There is no spinal canal or neural foraminal stenosis.  L3-L4: Disc height loss with vacuum phenomena and a slight bulge. This mildly  narrows the canal, no change  L4-L5: Disc height loss with a small bulge mildly narrowing the canal and  moderately narrows the foramen. No change,  L5-S1: This level is been decompressed  Postoperative changes L5-S1 with degenerative changes of the adjacent levels  most significant at L4-5 better evaluated on previous MRI. No fracture  MRI lumbar

## 2024-06-13 NOTE — CARE COORDINATION
4:31 PM    [facility], [facility], and [facility] declined to accept pt.    [facility] will reach out to pt directly as pt has already discharged home.        2:29 PM    [facility] has declined pt in [facility]. CM sent additional referrals via [facility] to see which agency has staffing/accept's pts insurance ([facility], [facility], [facility], [facility] and [facility])- awaiting response.    CM will continue to follow.        11:08 AM  06/13/24    Care Management Progress Note    Reason for Admission:   Tachycardia [R00.0]  Right hip pain [M25.551]  Hip pain, bilateral [M25.551, M25.552]  Fall from bed, initial encounter [W06.XXXA]  Acute pain of right knee [M25.561]  Back pain at L4-L5 level [M54.50]         Patient Admission Status: Inpatient  RUR: No data recorded  Hospitalization in the last 30 days (Readmission):  No        Transition of care plan:  Pt discussed during IDR- MD reports pt is medically cleared to discharge today and dc summary in chart.  Anticipated discharge home with family today- CM met face to face with pt; her father was at bedside. Pt reports that she is on service with [facility] home health and it is her preference to resume care with them. CM sent resumption of care referral via [facility]; awaiting response.  Outpatient follow-up.  Pt's family to transport. Pt's father verbalized ability to transport pt home today via private car.      Pt requested that CM make note of her desire to be on palliative care at home- she reported that she was previously with [facility] and briefly with [facility] palliative care. Pt stated that she is \"working on finding the right program that will accept me\". Pt and father also requested copies of her medical records from this hospitalization- pt stated that she does have MyChart and is active. CM gave pt information on how to request her records- pt agreeable.      SRINIVAS Cano

## (undated) DEVICE — SUTURE MCRYL SZ 2-0 L36IN ABSRB UD L36MM CT-1 1/2 CIR Y945H

## (undated) DEVICE — SUTURE VCRL SZ 2-0 L27IN ABSRB UD L26MM SH 1/2 CIR J417H

## (undated) DEVICE — SUT ETHLN 3-0 18IN PS1 BLK --

## (undated) DEVICE — STERILE POLYISOPRENE POWDER-FREE SURGICAL GLOVES: Brand: PROTEXIS

## (undated) DEVICE — BAG SPEC BIOHZRD 10 X 10 IN --

## (undated) DEVICE — DRAPE,U/ SHT,SPLIT,PLAS,STERIL: Brand: MEDLINE

## (undated) DEVICE — SYR 20ML LL STRL LF --

## (undated) DEVICE — TOTAL JOINT - SMH: Brand: MEDLINE INDUSTRIES, INC.

## (undated) DEVICE — SCRUB DRY SURG EZ SCRUB BRUSH PREOPERATIVE GRN

## (undated) DEVICE — SUTURE ABSORBABLE BRAIDED 2-0 CT-1 27 IN UD VICRYL J259H

## (undated) DEVICE — DRESSING HYDROCOLLOID BORDER 35X10 IN ALUM PRIMASEAL

## (undated) DEVICE — DISPOSABLE HIB PAC INCLUDES 3                                    GUIDEWIRES AND 2 ARTHROSCOPY NEEDLES

## (undated) DEVICE — Device

## (undated) DEVICE — DYONICS 25 INFLOW/OUTFLOW TUBE                                    SET, SINGLE SUCTION, 3 PER BOX

## (undated) DEVICE — GLOVE SURG SZ 8 L12IN FNGR THK79MIL GRN LTX FREE

## (undated) DEVICE — HIGH VISIBILITY SHEATH 5.5 MM                                    ABRADER BURR: Brand: DYONICS

## (undated) DEVICE — HYPODERMIC SAFETY NEEDLE: Brand: MAGELLAN

## (undated) DEVICE — PADDING CST 4INX4YD --

## (undated) DEVICE — CONTAINER SPEC 20 ML LID NEUT BUFF FORMALIN 10 % POLYPR STS

## (undated) DEVICE — ELECTRODE,RADIOTRANSLUCENT,FOAM,5PK: Brand: MEDLINE

## (undated) DEVICE — NEEDLE SPNL 18GA L3.5IN W/ QNCKE SHARPER BVL DURA CLICK

## (undated) DEVICE — SYR 3ML LL TIP 1/10ML GRAD --

## (undated) DEVICE — TUR SERIES SET

## (undated) DEVICE — 6619 2 PTNT ISO SYS INCISE AREA&LT;(&GT;&&LT;)&GT;P: Brand: STERI-DRAPE™ IOBAN™ 2

## (undated) DEVICE — ADHESIVE SKIN CLOSURE 4X22 CM PREMIERPRO EXOFINFUSION DISP

## (undated) DEVICE — GLOVE SURG SZ 65 L12IN FNGR THK79MIL GRN LTX FREE

## (undated) DEVICE — SET ADMIN 16ML TBNG L100IN 2 Y INJ SITE IV PIGGY BK DISP

## (undated) DEVICE — 4-PORT MANIFOLD: Brand: NEPTUNE 2

## (undated) DEVICE — CLEAR-TRAC COMPLETE HIP CANNULA                                    8.5 MM X 110 MM: Brand: CLEAR-TRAC

## (undated) DEVICE — DRAPE,REIN 53X77,STERILE: Brand: MEDLINE

## (undated) DEVICE — GLOVE ORTHO 8   MSG9480

## (undated) DEVICE — 1200 GUARD II KIT W/5MM TUBE W/O VAC TUBE: Brand: GUARDIAN

## (undated) DEVICE — C-ARM: Brand: UNBRANDED

## (undated) DEVICE — 4.5 MM INCISOR PLUS ELITE LONG                                    SHAVER BLADES AND BURRS, POWER/EP-1,                                    SLATE, 18 CM WORKING LENGTH,                                    PACKAGED 3 PER BOX, STERILE

## (undated) DEVICE — ACCU-PASS DIRECT CRESCENT XL: Brand: ACCU-PASS

## (undated) DEVICE — SOLIDIFIER FLD 2OZ 1500CC N DISINF IN BTL DISP SAFESORB

## (undated) DEVICE — FORCEPS BX L160CM DIA8MM GRSP DISECT CUP TIP NONLOCKING ROT

## (undated) DEVICE — TOWEL 4 PLY TISS 19X30 SUE WHT

## (undated) DEVICE — BASIN EMSIS 16OZ GRAPHITE PLAS KID SHP MOLD GRAD FOR ORAL

## (undated) DEVICE — GOWN,PREVENTION PLUS,XLN/2XL,ST,22/CS: Brand: MEDLINE

## (undated) DEVICE — SOLUTION IRRIG 3000ML LAC R FLX CONT

## (undated) DEVICE — COVER,TABLE,HEAVY DUTY,60"X90",STRL: Brand: MEDLINE

## (undated) DEVICE — T4 HOOD

## (undated) DEVICE — NEONATAL-ADULT SPO2 SENSOR: Brand: NELLCOR

## (undated) DEVICE — SYR 10ML LUER LOK 1/5ML GRAD --

## (undated) DEVICE — PREP SKN CHLRAPRP APL 26ML STR --

## (undated) DEVICE — DRAPE XR C ARM 41X74IN LF --

## (undated) DEVICE — AMBIENT HIPVAC 50 IFS: Brand: AMBIENT

## (undated) DEVICE — ARTHROSCOPY RICHMOND-LF: Brand: MEDLINE INDUSTRIES, INC.

## (undated) DEVICE — 450 ML BOTTLE OF 0.05% CHLORHEXIDINE GLUCONATE IN 99.95% STERILE WATER FOR IRRIGATION, USP AND APPLICATOR.: Brand: IRRISEPT ANTIMICROBIAL WOUND LAVAGE

## (undated) DEVICE — SOLUTION IRRIG 3000ML 0.9% SOD CHL USP UROMATIC PLAS CONT

## (undated) DEVICE — PADDING CAST SPEC 6INX4YD COT --

## (undated) DEVICE — INFECTION CONTROL KIT SYS

## (undated) DEVICE — BLOCK BITE ENDOSCP AD 21 MM W/ DIL BLU LF DISP

## (undated) DEVICE — STERILE POLYISOPRENE POWDER-FREE SURGICAL GLOVES WITH EMOLLIENT COATING: Brand: PROTEXIS

## (undated) DEVICE — CONTAINER,SPECIMEN,3OZ,OR STRL: Brand: MEDLINE

## (undated) DEVICE — SUTURE VCRL SZ 2 L54IN ABSRB UD L65MM TP-1 1/2 CIR J880T

## (undated) DEVICE — CATH IV AUTOGRD BC PNK 20GA 25 -- INSYTE

## (undated) DEVICE — SOLUTION IV 50ML 0.9% SOD CHL

## (undated) DEVICE — SOLUTION SURG PREP 26 CC PURPREP

## (undated) DEVICE — SUTURE MCRYL SZ 4 0 L18IN ABSRB VLT PS 1 L24MM 3 8 CIR REV Y682H

## (undated) DEVICE — SAMURAI BLADE FULL RADIUS: Brand: SAMURAI

## (undated) DEVICE — GLOVE SURG SZ 65 L12IN FNGR THK94MIL STD WHT LTX FREE

## (undated) DEVICE — Device: Brand: JELCO

## (undated) DEVICE — Z DISCONTINUED PER MEDLINE LINE GAS SAMPLING O2/CO2 LNG AD 13 FT NSL W/ TBNG FILTERLINE

## (undated) DEVICE — YANKAUER,TAPERED BULBOUS TIP,W/O VENT: Brand: MEDLINE

## (undated) DEVICE — SOLUTION IRRIG 1000ML STRL H2O USP PLAS POUR BTL

## (undated) DEVICE — BLADE SAW W073XL276IN THK0031IN CUT THK0036IN REPL SAG